# Patient Record
Sex: MALE | Race: WHITE | Employment: FULL TIME | URBAN - METROPOLITAN AREA
[De-identification: names, ages, dates, MRNs, and addresses within clinical notes are randomized per-mention and may not be internally consistent; named-entity substitution may affect disease eponyms.]

---

## 2017-01-03 ENCOUNTER — HOSPITAL ENCOUNTER (OUTPATIENT)
Dept: RADIATION ONCOLOGY | Age: 62
Discharge: HOME | End: 2017-01-03
Payer: COMMERCIAL

## 2017-01-03 PROCEDURE — 77412 RADIATION TX DELIVERY LVL 3: CPT

## 2017-01-03 PROCEDURE — 77300 RADIATION THERAPY DOSE PLAN: CPT

## 2017-01-03 PROCEDURE — 77387 GUIDANCE FOR RADJ TX DLVR: CPT

## 2017-01-03 PROCEDURE — 77417 THER RADIOLOGY PORT IMAGE(S): CPT

## 2017-01-03 PROCEDURE — 77295 3-D RADIOTHERAPY PLAN: CPT

## 2017-01-03 PROCEDURE — 77417 THER RADIOLOGY PORT IMAGE(S): CPT | Performed by: RADIOLOGY

## 2017-01-03 PROCEDURE — 77334 RADIATION TREATMENT AID(S): CPT

## 2017-01-04 PROCEDURE — 77387 GUIDANCE FOR RADJ TX DLVR: CPT

## 2017-01-04 PROCEDURE — 77412 RADIATION TX DELIVERY LVL 3: CPT

## 2017-01-04 PROCEDURE — 99212 OFFICE O/P EST SF 10 MIN: CPT

## 2017-01-05 PROCEDURE — 77387 GUIDANCE FOR RADJ TX DLVR: CPT

## 2017-01-05 PROCEDURE — 77412 RADIATION TX DELIVERY LVL 3: CPT

## 2017-01-06 PROCEDURE — 77387 GUIDANCE FOR RADJ TX DLVR: CPT | Performed by: RADIOLOGY

## 2017-01-06 PROCEDURE — 77412 RADIATION TX DELIVERY LVL 3: CPT | Performed by: RADIOLOGY

## 2017-01-09 PROCEDURE — 77412 RADIATION TX DELIVERY LVL 3: CPT | Performed by: RADIOLOGY

## 2017-01-09 PROCEDURE — 77387 GUIDANCE FOR RADJ TX DLVR: CPT | Performed by: RADIOLOGY

## 2017-01-09 PROCEDURE — 77417 THER RADIOLOGY PORT IMAGE(S): CPT | Performed by: RADIOLOGY

## 2017-01-10 PROCEDURE — 99213 OFFICE O/P EST LOW 20 MIN: CPT | Performed by: RADIOLOGY

## 2017-01-10 PROCEDURE — 77387 GUIDANCE FOR RADJ TX DLVR: CPT | Performed by: RADIOLOGY

## 2017-01-10 PROCEDURE — 77412 RADIATION TX DELIVERY LVL 3: CPT | Performed by: RADIOLOGY

## 2017-01-11 PROCEDURE — 77387 GUIDANCE FOR RADJ TX DLVR: CPT | Performed by: RADIOLOGY

## 2017-01-11 PROCEDURE — 77412 RADIATION TX DELIVERY LVL 3: CPT | Performed by: RADIOLOGY

## 2017-01-11 RX ORDER — CEFUROXIME AXETIL 500 MG/1
500 TABLET ORAL 2 TIMES DAILY
COMMUNITY
End: 2018-10-30

## 2017-01-12 PROCEDURE — 77412 RADIATION TX DELIVERY LVL 3: CPT | Performed by: RADIOLOGY

## 2017-01-12 PROCEDURE — 77387 GUIDANCE FOR RADJ TX DLVR: CPT | Performed by: RADIOLOGY

## 2017-01-13 PROCEDURE — 77412 RADIATION TX DELIVERY LVL 3: CPT | Performed by: RADIOLOGY

## 2017-01-13 PROCEDURE — 77336 RADIATION PHYSICS CONSULT: CPT | Performed by: RADIOLOGY

## 2017-01-13 PROCEDURE — 77387 GUIDANCE FOR RADJ TX DLVR: CPT | Performed by: RADIOLOGY

## 2017-01-16 PROCEDURE — 77336 RADIATION PHYSICS CONSULT: CPT | Performed by: RADIOLOGY

## 2017-01-16 PROCEDURE — 77387 GUIDANCE FOR RADJ TX DLVR: CPT | Performed by: RADIOLOGY

## 2017-01-16 PROCEDURE — 77412 RADIATION TX DELIVERY LVL 3: CPT | Performed by: RADIOLOGY

## 2018-09-06 ENCOUNTER — HOSPITAL ENCOUNTER (OUTPATIENT)
Dept: PHYSICAL THERAPY | Age: 63
Setting detail: THERAPIES SERIES
Discharge: HOME | End: 2018-09-06
Payer: COMMERCIAL

## 2018-09-06 PROCEDURE — G8978 MOBILITY CURRENT STATUS: HCPCS

## 2018-09-06 PROCEDURE — G8979 MOBILITY GOAL STATUS: HCPCS

## 2018-09-06 PROCEDURE — 97162 PT EVAL MOD COMPLEX 30 MIN: CPT

## 2018-09-06 PROCEDURE — 97110 THERAPEUTIC EXERCISES: CPT

## 2018-09-06 ASSESSMENT — PAIN DESCRIPTION - PAIN TYPE: TYPE: CHRONIC PAIN

## 2018-09-06 ASSESSMENT — PAIN DESCRIPTION - LOCATION: LOCATION: BACK

## 2018-09-06 ASSESSMENT — PAIN DESCRIPTION - ORIENTATION: ORIENTATION: MID;POSTERIOR

## 2018-09-06 ASSESSMENT — PAIN DESCRIPTION - FREQUENCY: FREQUENCY: CONTINUOUS

## 2018-09-06 ASSESSMENT — PAIN DESCRIPTION - DESCRIPTORS: DESCRIPTORS: ACHING;SORE

## 2018-09-06 ASSESSMENT — PAIN SCALES - GENERAL: PAINLEVEL_OUTOF10: 3

## 2018-09-06 ASSESSMENT — PAIN DESCRIPTION - DIRECTION: RADIATING_TOWARDS: DENIES RADICULAR PAIN

## 2018-09-06 NOTE — PROGRESS NOTES
[facility]   Outpatient Physical Therapy   Evaluation      [] [address]  [x] [facility]ENTER            of [address]     Date: 2018  Patient: Stalin Dye  : 1955  ACCT #: [de-identified]  Referring physician: Referring Practitioner: Dr. Sj Colin    Referring Practitioner: Dr. Sj Colin    Referral Date : 18    Diagnosis: multiple myeloma     Treatment Diagnosis: imbalance, difficulty with gait, decreased ROM, abnormal posture, LE weakness, decreased endurance   Onset Date: 16  PT Insurance Information: MMO  Total # of Visits Approved: 40   Total # of Visits to Date: 1  No Show: 0  Canceled Appointment: 0    History   has a past medical history of Cancer ([facility] [address] Elevated blood sugar; and Obesity. has a past surgical history that includes Mouth surgery; Foot surgery; hernia repair; and bone marrow biopsy. Cancer Diagnosis: multiple myeloma   Onset: 16    Treatment(s): (including dates, location, current, projected end of treatment)  []  Surgery:   []  Lymph node removal  [x]  Radiation: x1 mo  [x]  Chemotherapy: 2 rounds, finished   []  Other:    [x]  Known metastasis: (location) spinal lesions     Other related services:    []  Nutritionist   []  Palliative care   []  OT   []  Speech therapy    []      [x]  Medical Oncologist: Saira Rankin monthly     Current appointments: [x]  Yes []  No   [x]  Radiation Oncologist: Dr. John Rodriguez      Current appointments: []  Yes [x]  No     Allergies   Allergen Reactions    Shellfish-Derived Products Rash     Current Outpatient Prescriptions on File Prior to Encounter   Medication Sig Dispense Refill    cefUROXime (CEFTIN) 500 MG tablet Take 500 mg by mouth 2 times daily      ranitidine (ZANTAC) 150 MG tablet Take 150 mg by mouth daily      oxyCODONE 5 MG capsule Take 10 mg by mouth every 6 hours as needed for Pain .       traMADol (ULTRAM) 50 MG tablet Take 50 mg by mouth every 6 hours as needed for Pain       No current facility-administered medications on file prior to encounter. Subjective  Subjective: Pt reports diagnosed with multiple myeloma 12/25/16. Pt reports admitted to hospital with chest pain due to rib fx from myeloma. Pt reports 1 mo radiation and 2 courses of chemotherapy treatment. Pt reports multiple hospital admissions due to dehydration and PPM.  Pt reports was very weak and immobile requiring increased assistance for all mobility. Pt reports frequent anxiety attacks with initial movement. pt has been in remission x1 year. Now continued weakness, fatigue, difficulty with mobility, but improved since ending treatment. Pt reports spinal lesions, but unable to report location. Reports skeletal has mostly resolved. Would like to focus on improving strength and endurance to improve quality of life. Denies recent falls.    Additional Pertinent Hx: multiple myeloma   Pain Screening  Patient Currently in Pain: Yes  Pain Assessment  Pain Assessment: 0-10  Pain Level: 3 (Range: 2-8/10)  Pain Type: Chronic pain  Pain Location: Back (mid to lower thoracic )  Pain Orientation: Mid, Posterior  Pain Radiating Towards: denies radicular pain   Pain Descriptors: Aching, Sore  Pain Frequency: Continuous  Effect of Pain on Daily Activities: standing upright, yardwork  Pain Intervention(s): Rest, Cold applied    Social/Functional History  Lives With: Spouse  Type of Home: House  Home Layout: Two level, Bed/Bath upstairs (14 with 1 HR )  Home Access: Stairs to enter with rails  Entrance Stairs - Number of Steps: 4 with 1 HR   ADL Assistance: Independent  Homemaking Assistance: Needs assistance  Ambulation Assistance: Independent  Transfer Assistance: Independent  Active : Yes  Mode of Transportation: Car  Occupation: Part time employment  Type of occupation: working 4 hrs per day and at home as needed -    Leisure & Hobbies: would like to return to golf, construction around the house, working in the yard, ride bikes, hikes   IADL Comments: reports operating approximately 50-65% of normal function          Does patient currently participate in daily exercise:   [x]  No  []  Yes:    Side effects from cancer treatment: (include location when appropriate)  [x]   Pain: thoracic   [x]  Fatigue   [x]  Neuropathy: fingers and feet   [x]  Difficulty with ADLs   []  Assist needed   [x]  Change from previous functional status ~50% of normal function   []  Lymphedema  [x]  Limited ROM  back  []  Skin irritation/ burns:  [x]  Falls/ imbalance: during chemo, none recently   []  Difficulty with speech/ swallow:  [x]  Weight loss/ gain:  Lost 130#    Objective  Vision  Vision: Impaired (reports difficulty focusing at times, blurred with fatigue )  Hearing  Hearing: Within functional limits  Observation/Palpation  Posture: Fair (moderate kyphoscoliosis with elevated Rt scap and iliac crest, sig rounded shoulders )  Palpation: tenderness with palpation Lt iliac crest, paraspinals, thoracic spine   Edema: mild edema Rt distal LE, pt reports this is not new, no warmth or discoloration     Strength RLE  Strength RLE: Exception  R Hip Flexion: 4+/5  R Knee Flexion: 5/5  R Knee Extension: 5/5  R Ankle Dorsiflexion: 4+/5  Strength LLE  Strength LLE: Exception  L Hip Flexion: 4/5  L Knee Flexion: 4+/5  L Knee Extension: 5/5  L Ankle Dorsiflexion: 4+/5                                           Sensation  Overall Sensation Status:  (c/o CINP fingers, feet )            Ambulation 1  Surface: carpet  Device: No Device  Assistance: Independent  Quality of Gait: sig fwd flexed posture, decreased step length and foot clearance, no LOB, decreased teri, fatigues quickly   Distance: 35'   Comments: , spO2 99%  Stairs  # Steps : 4  Stairs Height: 8\"  Rails: Bilateral  Device: No Device  Assistance: Supervision  Comment: reciprocal up, non-reciprocal down            Dhaliwal Balance Score: Therapy - Soft Tissue Mobilization, Home Exercise Program, Safety Education & Training, Patient/Caregiver Education & Training, Equipment Evaluation, Education, & procurement, Modalities       Evaluation and patient rights have been reviewed and patient agrees with plan of care. Yes  [x]  No  []   Explain:     Signature: Electronically signed by Marlo Vee PT on 9/7/2018 at 7:52 AM    PT Individual Minutes  Time In: 0198  Time Out: 3924  Minutes: 53  Timed Code Treatment Minutes: 9 Minutes  Procedure Minutes: 40    Desir Fall Risk Assessment  Risk Factor Scale  Score   History of Falls [x] Yes  [] No 25  0 25   Secondary Diagnosis [] Yes  [x] No 15  0 0   Ambulatory Aid [] Furniture  [] Crutches/cane/walker  [x] None/bedrest/wheelchair/nurse 30  15  0 0   IV/Heparin Lock [] Yes  [x] No 20  0 0   Gait/Transferring [] Impaired  [x] Weak  [] Normal/bedrest/immobile 20  10  0 10   Mental Status [] Forgets limitations  [x] Oriented to own ability 15  0 0      Total: 35     Based on the Assessment score: check the appropriate box.   []  No intervention needed   Low =   Score of 0-24  [x]  Use standard prevention interventions Moderate =  Score of 24-44   [x] Discuss fall prevention strategies   [x] Indicate moderate falls risk on eval  []  Use high risk prevention interventions High = Score of 45 and higher   [] Discuss fall prevention strategies   [] Provide supervision during treatment time

## 2018-09-10 ENCOUNTER — HOSPITAL ENCOUNTER (OUTPATIENT)
Dept: PHYSICAL THERAPY | Age: 63
Setting detail: THERAPIES SERIES
Discharge: HOME | End: 2018-09-10
Payer: COMMERCIAL

## 2018-09-10 PROCEDURE — 97140 MANUAL THERAPY 1/> REGIONS: CPT

## 2018-09-10 PROCEDURE — 97110 THERAPEUTIC EXERCISES: CPT

## 2018-09-10 ASSESSMENT — PAIN DESCRIPTION - PAIN TYPE: TYPE: CHRONIC PAIN

## 2018-09-10 ASSESSMENT — PAIN DESCRIPTION - FREQUENCY: FREQUENCY: CONTINUOUS

## 2018-09-10 ASSESSMENT — PAIN SCALES - GENERAL: PAINLEVEL_OUTOF10: 1

## 2018-09-10 ASSESSMENT — PAIN DESCRIPTION - LOCATION: LOCATION: BACK

## 2018-09-10 ASSESSMENT — PAIN DESCRIPTION - ORIENTATION: ORIENTATION: MID;UPPER;POSTERIOR

## 2018-09-10 ASSESSMENT — PAIN DESCRIPTION - DESCRIPTORS: DESCRIPTORS: ACHING;SORE;TIGHTNESS

## 2018-09-10 NOTE — PROGRESS NOTES
[facility]   Outpatient Physical Therapy   Treatment Note  [] [address]  [x] [facility]  Date: 9/10/2018  Patient: Enrike Ryan  : 1955  ACCT #: [de-identified]  Referring Practitioner: Dr. Justino Machuca  Diagnosis: multiple myeloma     Visit Information:  PT Visit Information  Onset Date: 16  PT Insurance Information: MMO  Total # of Visits Approved: 40  Total # of Visits to Date: 2  No Show: 0  Canceled Appointment: 0  Progress Note Counter:     SUBJECTIVE:   Subjective  Subjective: \"Feel real tight but think exercises are helping loosen  me up\"  HEP Compliance:  [x] Good [] Fair [] Poor [] Reports not doing due to:    PAIN   Location:   Pain Location: Back  Pain Rating (0-10 pain scale):  Pain Level: 1  Pain Description:  Pain Descriptors: Aching, Sore, Tightness  Action:  [x] Acceptable for treatment  []  Other:    OBJECTIVE:   Exercises  Exercise 1: static stand feet apart/ together with eyes open; feet apart/together with eyes closed 30s/2 to improve balance   Exercise 2: posture exs x 15 ea to improve upright posture, balance and gait quality with VCs to keep within tolerable range   Exercise 4: Initiated seated hams str 30sx3 amie   Exercise 5: Initiated supine pec str x 2'   Exercise 6: Initiated chin tucks 5sx10   Exercise 7: Initiated bridges x 10 for core stability   Exercise 11: Attempted standing hip abduction x5 with increase fatigue  Exercise 20: HEP: chin tucks, trunk rotation, hamstring stretch    Manual: []  NA   Manual therapy  Soft Tissue Mobalization:  paraspinals release seated and MFR to pec muscles supine      HEP  Continue with current Home Exercise Program.  See Objective section for progression of HEP. Comments:       POST-PAIN    Pain Rating (0-10 pain scale): 0/10  Location and Pain Description same as pre-pain unless otherwise indicated.   Action: [] NA  [] Call Physician  [x] Perform HEP  [] Meds as prescribed     ASSESSMENT:     Conditions Requiring Skilled Therapeutic Intervention  Assessment: Pt required frequent rest breaks with tx as fatigued quickly. Frequent cuing with tx for upright posutre  Patient Education: Discussed with pt posture awareness and diaphramic breathing     Tolerance to treatment:  [x] Good   [] Fair   [] Poor  [] Fatigued   [] Increased pain   Limited by:    Goals:     Short term goals  Time Frame for Short term goals: 3 wks   Short term goal 1: Independent with HEP   Short term goal 2: Report 25% improvement with function and decreased fatigue  Short term goal 3: Improve LE strength 1/2 grade to allow improved gait quality and balance   Long term goals  Time Frame for Long term goals : 6 wks   Long term goal 1: Dhaliwal >/= 50/56 to demonstrate improved static balance and decreased risk for falls   Long term goal 2: DGI >/= 18/24 to demonstrate improved dynamic balance and decreased risk for falls   Long term goal 3: Ambulate >/= 150' with improved step length, foot clearance and upright posture   Long term goal 4: Improve FACT G by 5 points to demonstrate improved overall functional well being   Long term goal 5: Improve Piper Fatigue score by 4 points to demonstrate improved activity tolerance     Progress toward goals:  Goals Met:    []  See updated POC   Comments:    PLAN:  [x] Continue POC to pt tolerance  [] Hold PT for ___ days.   See note to physician  [] Discharge PT    Signature:   Electronically signed by Mera Stanford PTA on 9/10/18 at 9:31 AM    PT Individual Minutes  Time In: 6815  Time Out: 9608  Minutes: 45   Activity Minutes Units   Ther Ex 35 2   Manual   10  1   Neuro Ed     US         Timed Code Treatment Minutes: 39 Minutes

## 2018-09-12 ENCOUNTER — HOSPITAL ENCOUNTER (OUTPATIENT)
Dept: PHYSICAL THERAPY | Age: 63
Setting detail: THERAPIES SERIES
Discharge: HOME | End: 2018-09-12
Payer: COMMERCIAL

## 2018-09-12 PROCEDURE — 97140 MANUAL THERAPY 1/> REGIONS: CPT

## 2018-09-12 PROCEDURE — 97110 THERAPEUTIC EXERCISES: CPT

## 2018-09-12 ASSESSMENT — PAIN DESCRIPTION - LOCATION: LOCATION: BACK

## 2018-09-12 ASSESSMENT — PAIN DESCRIPTION - ORIENTATION: ORIENTATION: MID;UPPER;POSTERIOR

## 2018-09-12 ASSESSMENT — PAIN DESCRIPTION - DESCRIPTORS: DESCRIPTORS: ACHING;SORE;TIGHTNESS

## 2018-09-12 ASSESSMENT — PAIN DESCRIPTION - FREQUENCY: FREQUENCY: CONTINUOUS

## 2018-09-12 ASSESSMENT — PAIN SCALES - GENERAL: PAINLEVEL_OUTOF10: 1

## 2018-09-12 ASSESSMENT — PAIN DESCRIPTION - PAIN TYPE: TYPE: CHRONIC PAIN

## 2018-09-12 NOTE — PROGRESS NOTES
[facility]   Outpatient Physical Therapy   Treatment Note  [] [address]  [x] [facility]  Date: 2018  Patient: Silvino Kebede  : 1955  ACCT #: [de-identified]  Referring Practitioner: Dr. Ele Mario  Diagnosis: multiple myeloma     Visit Information:  PT Visit Information  Onset Date: 16  PT Insurance Information: MMO  Total # of Visits Approved: 40  Total # of Visits to Date: 3  No Show: 0  Canceled Appointment: 0  Progress Note Counter: 3/12    SUBJECTIVE:   Subjective  Subjective: \"Feel stiff and tight today. My back is a little sore\"  HEP Compliance:  [x] Good [] Fair [] Poor [] Reports not doing due to:    PAIN   Location:   Pain Location: Back  Pain Rating (0-10 pain scale):  Pain Level: 1  Pain Description:  Pain Descriptors: Aching, Sore, Tightness  Action:  [x] Acceptable for treatment  []  Other:    OBJECTIVE:   Exercises  Exercise 1: static stand feet apart/ together with eyes open; feet apart/together with eyes closed 30s/2 to improve balance   Exercise 2: posture exs x 15 ea to improve upright posture, balance and gait quality with VCs to keep within tolerable range   Exercise 3: Initiated nustep L1 x 5' for 4 ext and trunk ROM, strengthening and endurance  Exercise 4: seated hams str 30sx3 amie   Exercise 5: supine pec str x 3'   Exercise 6:  chin tucks 5sx10   Exercise 7:  bridges x 10 for core stability   Exercise 12: trunk rotation 30sx3 to increase trunk flexibility  Exercise 13: Initiated DLS supine UE/LE separately x 10 for core stability    Manual: []  NA   Manual therapy  Soft Tissue Mobalization:  paraspinals release seated and MFR to pec muscles supine    Modalities: [x]  NA        Mobility: [x]  NA    HEP  Continue with current Home Exercise Program.  See Objective section for progression of HEP.       Comments:       POST-PAIN    Pain Rating (0-10 pain scale): 0/10  Location and Pain Description same as pre-pain unless otherwise indicated. Action: [] NA  [] Call Physician  [x] Perform HEP  [] Meds as prescribed     ASSESSMENT:     Conditions Requiring Skilled Therapeutic Intervention  Assessment: Pt. continues to fatigue quickly with tx. Frequent cues with tx for upright posture. Pt reports decrease soreness and tightness after tx. Tolerance to treatment:  [x] Good   [] Fair   [] Poor  [] Fatigued   [] Increased pain   Limited by:    Goals:     Short term goals  Time Frame for Short term goals: 3 wks   Short term goal 1: Independent with HEP   Short term goal 2: Report 25% improvement with function and decreased fatigue  Short term goal 3: Improve LE strength 1/2 grade to allow improved gait quality and balance   Long term goals  Time Frame for Long term goals : 6 wks   Long term goal 1: Dhaliwal >/= 50/56 to demonstrate improved static balance and decreased risk for falls   Long term goal 2: DGI >/= 18/24 to demonstrate improved dynamic balance and decreased risk for falls   Long term goal 3: Ambulate >/= 150' with improved step length, foot clearance and upright posture   Long term goal 4: Improve FACT G by 5 points to demonstrate improved overall functional well being   Long term goal 5: Improve Piper Fatigue score by 4 points to demonstrate improved activity tolerance     Progress toward goals:  Goals Met:    []  See updated POC   Comments:    PLAN:  [x] Continue POC to pt tolerance  [] Hold PT for ___ days.   See note to physician  [] Discharge PT    Signature:   Electronically signed by Kapil Quintanilla PTA on 9/12/18 at 9:21 AM    PT Individual Minutes  Time In: 0800  Time Out: 4988  Minutes: 53   Activity Minutes Units   Ther Ex 38 3   Manual   15  1   Neuro Ed     US         Timed Code Treatment Minutes: 48 Minutes

## 2018-09-17 ENCOUNTER — HOSPITAL ENCOUNTER (OUTPATIENT)
Dept: PHYSICAL THERAPY | Age: 63
Setting detail: THERAPIES SERIES
Discharge: HOME | End: 2018-09-17
Payer: COMMERCIAL

## 2018-09-17 PROCEDURE — 97110 THERAPEUTIC EXERCISES: CPT

## 2018-09-17 PROCEDURE — 97112 NEUROMUSCULAR REEDUCATION: CPT

## 2018-09-17 PROCEDURE — 97140 MANUAL THERAPY 1/> REGIONS: CPT

## 2018-09-17 ASSESSMENT — PAIN DESCRIPTION - LOCATION: LOCATION: BACK

## 2018-09-17 ASSESSMENT — PAIN DESCRIPTION - ORIENTATION: ORIENTATION: MID;UPPER

## 2018-09-17 ASSESSMENT — PAIN SCALES - GENERAL: PAINLEVEL_OUTOF10: 3

## 2018-09-17 NOTE — PROGRESS NOTES
JAYY Gonzalez on 9/17/18 at 2:05 PM    PT Individual Minutes  Time In: 1402  Time Out: 1457  Minutes: 55  Timed Code Treatment Minutes: 55 Minutes           Activity Minutes Units   Ther Ex 30 2   Manual  10 1   Neuro Ed 15 1

## 2018-09-20 ENCOUNTER — HOSPITAL ENCOUNTER (OUTPATIENT)
Dept: PHYSICAL THERAPY | Age: 63
Setting detail: THERAPIES SERIES
Discharge: HOME | End: 2018-09-20
Payer: COMMERCIAL

## 2018-09-20 PROCEDURE — 97140 MANUAL THERAPY 1/> REGIONS: CPT

## 2018-09-20 PROCEDURE — 97110 THERAPEUTIC EXERCISES: CPT

## 2018-09-20 ASSESSMENT — PAIN DESCRIPTION - LOCATION: LOCATION: BACK

## 2018-09-20 ASSESSMENT — PAIN DESCRIPTION - DESCRIPTORS: DESCRIPTORS: SORE

## 2018-09-20 ASSESSMENT — PAIN DESCRIPTION - ORIENTATION: ORIENTATION: MID

## 2018-09-20 ASSESSMENT — PAIN SCALES - GENERAL: PAINLEVEL_OUTOF10: 2

## 2018-09-20 NOTE — PROGRESS NOTES
[facility]   Outpatient Physical Therapy   Treatment Note  [] [address]  [x] [facility]ENTER            of [address]  Date: 2018  Patient: Audrey Burgess   : 1955  ACCT #: [de-identified]  Referring Practitioner: Dr. Avelino Apgar  Diagnosis: multiple myeloma      Visit Information:  PT Visit Information  Onset Date: 16  PT Insurance Information: MMO  Total # of Visits Approved: 40  Total # of Visits to Date: 5  No Show: 0  Canceled Appointment: 0  Progress Note Counter:     SUBJECTIVE:   Subjective  Subjective: Pt reports feeling much better this date than last appt on Monday. States thinks may have had cold. Was sick following 2 days. Pt reports compliance with HEP. HEP Compliance:  [x] Good [] Fair [] Poor [] Reports not doing due to:    PAIN   Location:   Pain Location: Back  Pain Rating (0-10 pain scale):  Pain Level: 2  Pain Description:  Pain Descriptors: Sore  Action:  [x] Acceptable for treatment  []  Other:    OBJECTIVE:   Exercises  Exercise 2: posture exs x 15 ea to improve upright posture, balance and gait quality with VCs to keep within tolerable range   Exercise 4: standing hams str 30s x 3 amie   Exercise 5: supine pec str x 3'   Exercise 6: chin tucks seated 5s x 15   Exercise 7: bridges 5s x 10 for core stability   Exercise 8: SLR x7 b/l  Exercise 10: gait drills: marching/L/R x 2 laps in // bars with occasional 1 UE support  Exercise 12: LTR 10s x 10 to increase trunk flexibility  Exercise 14: seated DF 3s x 10  Exercise 20: HEP: bridge, DF, SLR    Manual: []  NA   Manual therapy  Soft Tissue Mobalization:  paraspinals release seated and MFR to pec muscles supine    Modalities: [x]  NA    Mobility: [x]  NA    Strength: [x] NT    ROM: [x] NT    HEP  Continue with current Home Exercise Program.  See Objective section for progression of HEP.       Comments:       POST-PAIN    Pain Rating (0-10 pain scale):  0/10  Location and

## 2018-09-24 ENCOUNTER — HOSPITAL ENCOUNTER (OUTPATIENT)
Dept: PHYSICAL THERAPY | Age: 63
Setting detail: THERAPIES SERIES
Discharge: HOME | End: 2018-09-24
Payer: COMMERCIAL

## 2018-09-24 PROCEDURE — 97140 MANUAL THERAPY 1/> REGIONS: CPT

## 2018-09-24 PROCEDURE — 97110 THERAPEUTIC EXERCISES: CPT

## 2018-09-24 ASSESSMENT — PAIN DESCRIPTION - DESCRIPTORS: DESCRIPTORS: SORE;TIGHTNESS

## 2018-09-24 ASSESSMENT — PAIN DESCRIPTION - LOCATION: LOCATION: BACK

## 2018-09-24 ASSESSMENT — PAIN DESCRIPTION - PAIN TYPE: TYPE: CHRONIC PAIN

## 2018-09-24 ASSESSMENT — PAIN SCALES - GENERAL: PAINLEVEL_OUTOF10: 2

## 2018-09-24 ASSESSMENT — PAIN DESCRIPTION - ORIENTATION: ORIENTATION: MID

## 2018-09-24 NOTE — PROGRESS NOTES
[facility]   Outpatient Physical Therapy   Treatment Note  [] [address]  [x] [facility]ENTER            of [address]  Date: 2018  Patient: Judy Hernandez  : 1955  ACCT #: [de-identified]  Referring Practitioner: Dr. Johanna Aguilar  Diagnosis: multiple myeloma     Visit Information:  PT Visit Information  Onset Date: 16  PT Insurance Information: MMO  Total # of Visits Approved: 40  Total # of Visits to Date: 6  No Show: 0  Canceled Appointment: 0  Progress Note Counter:     SUBJECTIVE:   Subjective  Subjective: \"Feel PT is helping. Over did my LTR on Friday. Went too far. Pain was 7/10. Oneal Watts Sore over the w/e\"  HEP Compliance:  [x] Good [] Fair [] Poor [] Reports not doing due to:    PAIN   Location:   Pain Location: Back  Pain Rating (0-10 pain scale):  Pain Level: 2  Pain Description:  Pain Descriptors: Sore, Tightness  Action:  [x] Acceptable for treatment  []  Other:    OBJECTIVE:   Exercises  Exercise 2: posture exs x 15 ea to improve upright posture, balance and gait quality with VCs to keep within tolerable range   Exercise 3: nustep L1 x 5' for 4 ext and trunk ROM, strengthening and endurance  Exercise 4: standing hams str 30s x 3 amie   Exercise 5: supine pec str x 3'   Exercise 6: chin tucks seated 5s x 15   Exercise 7: bridges 5s x 15 for core stability   Exercise 9: sink ex x 5ea with 1 seated rest break after 3 ex. Exercise 12: LTR 10s x 10 to increase trunk flexibility  Exercise 13: DLS supine UE/LE together3 way x 10 for core stability  Exercise 14: seated DF 5s x 10    Manual: []  NA   Manual therapy  Soft Tissue Mobalization:  paraspinals release and MFR to pec muscles . STM and MFR to thoracic region prone      HEP  Continue with current Home Exercise Program.  See Objective section for progression of HEP.       Comments:       POST-PAIN    Pain Rating (0-10 pain scale): 1/10  Location and Pain Description same as pre-pain unless otherwise indicated. Action: [] NA  [] Call Physician  [x] Perform HEP  [] Meds as prescribed     ASSESSMENT:     Conditions Requiring Skilled Therapeutic Intervention  Assessment: Increase endurance to standing ex noted this date. Pt with increase tightness noted in thoracic musclature. Pt reports  decrease muscle tightness and pain after manual. Frequent cues with tx for posture awareness     Tolerance to treatment:  [x] Good   [] Fair   [] Poor  [] Fatigued   [] Increased pain   Limited by:    Goals:     Short term goals  Time Frame for Short term goals: 3 wks   Short term goal 1: Independent with HEP   Short term goal 2: Report 25% improvement with function and decreased fatigue  Short term goal 3: Improve LE strength 1/2 grade to allow improved gait quality and balance   Long term goals  Time Frame for Long term goals : 6 wks   Long term goal 1: Dhaliwal >/= 50/56 to demonstrate improved static balance and decreased risk for falls   Long term goal 2: DGI >/= 18/24 to demonstrate improved dynamic balance and decreased risk for falls   Long term goal 3: Ambulate >/= 150' with improved step length, foot clearance and upright posture   Long term goal 4: Improve FACT G by 5 points to demonstrate improved overall functional well being   Long term goal 5: Improve Piper Fatigue score by 4 points to demonstrate improved activity tolerance     Progress toward goals:  Goals Met:    []  See updated POC   Comments:    PLAN:  [x] Continue POC to pt tolerance  [] Hold PT for ___ days.   See note to physician  [] Discharge PT    Signature:   Electronically signed by Marlys Gaspar PTA on 9/24/18 at 2:19 PM    PT Individual Minutes  Time In: 3145  Time Out: 1500  Minutes: 58   Activity Minutes Units   Ther Ex 38 3   Manual   20  1   Neuro Ed     US         Timed Code Treatment Minutes: 62 Minutes

## 2018-09-27 ENCOUNTER — HOSPITAL ENCOUNTER (OUTPATIENT)
Dept: PHYSICAL THERAPY | Age: 63
Setting detail: THERAPIES SERIES
Discharge: HOME | End: 2018-09-27
Payer: COMMERCIAL

## 2018-09-27 PROCEDURE — 97140 MANUAL THERAPY 1/> REGIONS: CPT

## 2018-09-27 PROCEDURE — 97110 THERAPEUTIC EXERCISES: CPT

## 2018-09-27 ASSESSMENT — PAIN DESCRIPTION - PAIN TYPE: TYPE: CHRONIC PAIN

## 2018-09-27 ASSESSMENT — PAIN DESCRIPTION - DESCRIPTORS: DESCRIPTORS: SORE;TIGHTNESS

## 2018-09-27 ASSESSMENT — PAIN DESCRIPTION - LOCATION: LOCATION: BACK

## 2018-09-27 ASSESSMENT — PAIN SCALES - GENERAL: PAINLEVEL_OUTOF10: 2

## 2018-09-27 ASSESSMENT — PAIN DESCRIPTION - ORIENTATION: ORIENTATION: MID

## 2018-09-27 NOTE — PROGRESS NOTES
[facility]   Outpatient Physical Therapy   Treatment Note  [] [address]  [x] [facility]            of [place]  Date: 2018  Patient: Yazmin Eldridge  : 1955  ACCT #: [de-identified]  Referring Practitioner: Dr. Mihaela Kruse  Diagnosis: multiple myeloma     Visit Information:  PT Visit Information  Onset Date: 16  PT Insurance Information: MMO  Total # of Visits Approved: 40  Total # of Visits to Date: 7  No Show: 0  Canceled Appointment: 0  Progress Note Counter:     SUBJECTIVE:   Subjective  Subjective: Pt reports 30% to 40% improvement with function and endurance since starting PT. \"Was hurting after last visit 6/10. Better today\"  HEP Compliance:  [x] Good [] Fair [] Poor [] Reports not doing due to:    PAIN   Location:   Pain Location: Back  Pain Rating (0-10 pain scale):  Pain Level: 2  Pain Description:  Pain Descriptors: Sore, Tightness  Action:  [x] Acceptable for treatment  []  Other:    OBJECTIVE:   Exercises  Exercise 2: posture exs x 15 ea to improve upright posture, balance and gait quality with VCs to keep within tolerable range   Exercise 3: nustep L1 x 5' for 4 ext and trunk ROM, strengthening and endurance  Exercise 4: seated hams str amie and left gastroc stretch with strap  Exercise 5: supine pec str x 3'   Exercise 7: bridges 5s x 15 for core stability   Exercise 9: sink ex with modified hip abduction and ext 5ea with no rest break  Exercise 12: LTR 10s x 10 to increase trunk flexibility  Exercise 13: DLS supine UE/LE together x 10 for core stability  Exercise 14: seated DF 5s x 10 with strap on LLE to increase ROM  Exercise 20: HEP: sink ex 5 reps with modiffcations    Manual: []  NA   Manual therapy  Soft Tissue Mobalization:  paraspinals release and MFR to pec muscles .  STM with and without tennis ball and MFR to thoracic region prone    Modalities: []  NA        HEP  Continue with current Home Exercise Program.  See Objective section for progression of HEP. Comments:       POST-PAIN    Pain Rating (0-10 pain scale): 1/10  Location and Pain Description same as pre-pain unless otherwise indicated. Action: [] NA  [] Call Physician  [x] Perform HEP  [] Meds as prescribed     ASSESSMENT:     Conditions Requiring Skilled Therapeutic Intervention  Assessment: Increase in endurance noted this date. Tightness with multiple spasms noted in amie intrascap and thoracic regions noted      Tolerance to treatment:  [x] Good   [] Fair   [] Poor  [] Fatigued   [] Increased pain   Limited by:    Goals:     Short term goals  Time Frame for Short term goals: 3 wks   Short term goal 1: Independent with HEP   Short term goal 2: Report 25% improvement with function and decreased fatigue  Short term goal 3: Improve LE strength 1/2 grade to allow improved gait quality and balance   Long term goals  Time Frame for Long term goals : 6 wks   Long term goal 1: Dhaliwal >/= 50/56 to demonstrate improved static balance and decreased risk for falls   Long term goal 2: DGI >/= 18/24 to demonstrate improved dynamic balance and decreased risk for falls   Long term goal 3: Ambulate >/= 150' with improved step length, foot clearance and upright posture   Long term goal 4: Improve FACT G by 5 points to demonstrate improved overall functional well being   Long term goal 5: Improve Piper Fatigue score by 4 points to demonstrate improved activity tolerance     Progress toward goals:  Goals Met:    []  See updated POC   Comments:    PLAN:  [x] Continue POC to pt tolerance  [] Hold PT for ___ days.   See note to physician  [] Discharge PT    Signature:   Electronically signed by Sterling Nieves PTA on 9/27/18 at 2:12 PM    PT Individual Minutes  Time In: 1400  Time Out: 5871  Minutes: 58   Activity Minutes Units   Ther Ex 40 2   Manual   18  1   Neuro Ed     US         Timed Code Treatment Minutes: 62 Minutes

## 2018-10-01 ENCOUNTER — HOSPITAL ENCOUNTER (OUTPATIENT)
Dept: PHYSICAL THERAPY | Age: 63
Setting detail: THERAPIES SERIES
Discharge: HOME | End: 2018-10-01
Payer: COMMERCIAL

## 2018-10-01 PROCEDURE — 97110 THERAPEUTIC EXERCISES: CPT

## 2018-10-01 ASSESSMENT — PAIN DESCRIPTION - ORIENTATION: ORIENTATION: LEFT;RIGHT

## 2018-10-01 ASSESSMENT — PAIN DESCRIPTION - LOCATION: LOCATION: LEG;ABDOMEN

## 2018-10-01 ASSESSMENT — PAIN SCALES - GENERAL: PAINLEVEL_OUTOF10: 2

## 2018-10-04 ENCOUNTER — HOSPITAL ENCOUNTER (OUTPATIENT)
Dept: PHYSICAL THERAPY | Age: 63
Setting detail: THERAPIES SERIES
Discharge: HOME | End: 2018-10-04
Payer: COMMERCIAL

## 2018-10-04 PROCEDURE — 97110 THERAPEUTIC EXERCISES: CPT

## 2018-10-04 ASSESSMENT — PAIN DESCRIPTION - DESCRIPTORS: DESCRIPTORS: ACHING

## 2018-10-04 ASSESSMENT — PAIN DESCRIPTION - ORIENTATION: ORIENTATION: UPPER

## 2018-10-04 ASSESSMENT — PAIN DESCRIPTION - PAIN TYPE: TYPE: CHRONIC PAIN

## 2018-10-04 ASSESSMENT — PAIN SCALES - GENERAL: PAINLEVEL_OUTOF10: 2

## 2018-10-04 ASSESSMENT — PAIN DESCRIPTION - LOCATION: LOCATION: BACK

## 2018-10-08 ENCOUNTER — HOSPITAL ENCOUNTER (OUTPATIENT)
Dept: PHYSICAL THERAPY | Age: 63
Setting detail: THERAPIES SERIES
Discharge: HOME | End: 2018-10-08
Payer: COMMERCIAL

## 2018-10-08 PROCEDURE — 97110 THERAPEUTIC EXERCISES: CPT

## 2018-10-08 ASSESSMENT — PAIN SCALES - GENERAL: PAINLEVEL_OUTOF10: 0

## 2018-10-11 ENCOUNTER — HOSPITAL ENCOUNTER (OUTPATIENT)
Dept: PHYSICAL THERAPY | Age: 63
Setting detail: THERAPIES SERIES
Discharge: HOME | End: 2018-10-11
Payer: COMMERCIAL

## 2018-10-11 PROCEDURE — 97140 MANUAL THERAPY 1/> REGIONS: CPT

## 2018-10-11 PROCEDURE — 97110 THERAPEUTIC EXERCISES: CPT

## 2018-10-11 ASSESSMENT — PAIN SCALES - GENERAL: PAINLEVEL_OUTOF10: 4

## 2018-10-11 ASSESSMENT — PAIN DESCRIPTION - ORIENTATION: ORIENTATION: LEFT;UPPER

## 2018-10-11 ASSESSMENT — PAIN DESCRIPTION - DESCRIPTORS: DESCRIPTORS: ACHING;TIGHTNESS

## 2018-10-11 ASSESSMENT — PAIN DESCRIPTION - PAIN TYPE: TYPE: CHRONIC PAIN

## 2018-10-11 ASSESSMENT — PAIN DESCRIPTION - LOCATION: LOCATION: BACK

## 2018-10-11 NOTE — PROGRESS NOTES
[facility]   Outpatient Physical Therapy   Treatment Note  [] [address]  [x] [facility]            of [address]  Date: 10/11/2018  Patient: Oscar Pina  : 1955  ACCT #: [de-identified]  Referring Practitioner: Dr. Jani Nair  Diagnosis: multiple myeloma     Visit Information:  PT Visit Information  Onset Date: 16  PT Insurance Information: MMO  Total # of Visits Approved: 40  Total # of Visits to Date: 11  No Show: 0  Canceled Appointment: 0  Progress Note Counter:     SUBJECTIVE:   Subjective  Subjective: \"Was sore 6-7/10 for 2 days after last tx  mostly on left side. Used heating pad and didn't do my exercises. Think exercises with ball under my legs and theraband ex increases my pain\"  HEP Compliance:  [x] Good [] Fair [] Poor [] Reports not doing due to:    PAIN   Location:   Pain Location: Back  Pain Rating (0-10 pain scale):  Pain Level: 4  Pain Description:  Pain Descriptors: Aching, Tightness  Action:  [x] Acceptable for treatment  []  Other:    OBJECTIVE:   Exercises  Exercise 1: wall stands 30s x 3  Exercise 2: Held YTB due to reports of increasing pain  Exercise 3: nustep L2 x 10' for ext and trunk ROM, strengthening and endurance  Exercise 4: seated B/L hams str and gastroc str with strap 30s x 3 ea  Exercise 5: Pec stretch in doorway 30s x 3  Exercise 7:  bridges 5sx10  Exercise 9: sink ex x12 ea without rest break  Exercise 12: LTR  10s x 5 to increase trunk flexibility  Exercise 13: DLS supine UE/LE together x 15 for core stability    Manual: []  NA   Manual therapy  Soft Tissue Mobalization:  postural paraspinals release and MFR to pec muscles . STM and releases to thoracic and scapular musclature with pball support to LB with pt seated    Modalities: []  NA    Mobility: [x]  NA    HEP  Continue with current Home Exercise Program.  See Objective section for progression of HEP.       Comments:       POST-PAIN    Pain

## 2018-10-15 ENCOUNTER — HOSPITAL ENCOUNTER (OUTPATIENT)
Dept: PHYSICAL THERAPY | Age: 63
Setting detail: THERAPIES SERIES
Discharge: HOME | End: 2018-10-15
Payer: COMMERCIAL

## 2018-10-15 PROCEDURE — 97140 MANUAL THERAPY 1/> REGIONS: CPT

## 2018-10-15 PROCEDURE — 97110 THERAPEUTIC EXERCISES: CPT

## 2018-10-15 PROCEDURE — 97116 GAIT TRAINING THERAPY: CPT

## 2018-10-15 PROCEDURE — G8979 MOBILITY GOAL STATUS: HCPCS

## 2018-10-15 PROCEDURE — G8978 MOBILITY CURRENT STATUS: HCPCS

## 2018-10-15 PROCEDURE — 97112 NEUROMUSCULAR REEDUCATION: CPT

## 2018-10-15 ASSESSMENT — PAIN DESCRIPTION - PAIN TYPE: TYPE: CHRONIC PAIN

## 2018-10-15 ASSESSMENT — PAIN DESCRIPTION - LOCATION: LOCATION: BACK

## 2018-10-15 ASSESSMENT — PAIN DESCRIPTION - DESCRIPTORS: DESCRIPTORS: ACHING;TIGHTNESS

## 2018-10-15 ASSESSMENT — PAIN DESCRIPTION - ORIENTATION: ORIENTATION: LEFT;UPPER

## 2018-10-15 ASSESSMENT — PAIN SCALES - GENERAL: PAINLEVEL_OUTOF10: 3

## 2018-10-15 NOTE — PROGRESS NOTES
[facility]   Outpatient Physical Therapy   Treatment Note  [] [address]  [x] [facility]            of [address]  Date: 10/15/2018  Patient: Swathi Cadena  : 1955  ACCT #: [de-identified]  Referring Practitioner: Dr. Celestina Siemens  Diagnosis: multiple myeloma     Visit Information:  PT Visit Information  Onset Date: 16  PT Insurance Information: MMO  Total # of Visits Approved: 40  Total # of Visits to Date: 12  No Show: 0  Canceled Appointment: 0  Progress Note Counter:     SUBJECTIVE:   Subjective  Subjective: \"Feel I've improved about 60% in function and decrease fatigue since started PT. Still have to be carefule not to overdue it. \"  HEP Compliance:  [x] Good [] Fair [] Poor [] Reports not doing due to:    PAIN   Location:   Pain Location: Back  Pain Rating (0-10 pain scale):  Pain Level: 3  Pain Description:  Pain Descriptors: Aching, Tightness  Action:  [x] Acceptable for treatment  []  Other:    OBJECTIVE:   Exercises  Exercise 1: wall stands 30s x 3  Exercise 2: Held YTB due to reports of increasing pain  Exercise 3: nustep L3 x 10' for ext and trunk ROM, strengthening and endurance  Exercise 5: Pec stretch in doorway 30s x 3    Manual: []  NA   Manual therapy  Soft Tissue Mobalization:  postural paraspinals release and MFR to pec muscles . STM and releases to thoracic and scapular musclature with pball support to LB with pt seated    Modalities: [x]  NA        Mobility: []  NA     Ambulation 1  Surface: carpet  Device: No Device  Assistance: Independent  Quality of Gait: pt ambulating with improved upright posture, decreased Lt heel strike and foot clearance, improving teri, mild steppage gait pattern   Distance: 35'  Ambulation 2  Surface - 2: carpet  Device 2: No device  Other Apparatus 2: Dorsiflex Assist (wrap )  Assistance 2:  Independent  Quality of Gait 2: pt with improved heel strike, teri, and step length with dorsi

## 2018-10-18 ENCOUNTER — HOSPITAL ENCOUNTER (OUTPATIENT)
Dept: PHYSICAL THERAPY | Age: 63
Setting detail: THERAPIES SERIES
Discharge: HOME | End: 2018-10-18
Payer: COMMERCIAL

## 2018-10-18 PROCEDURE — 97110 THERAPEUTIC EXERCISES: CPT

## 2018-10-18 PROCEDURE — 97140 MANUAL THERAPY 1/> REGIONS: CPT

## 2018-10-18 ASSESSMENT — PAIN SCALES - GENERAL: PAINLEVEL_OUTOF10: 2

## 2018-10-18 ASSESSMENT — PAIN DESCRIPTION - LOCATION: LOCATION: BACK

## 2018-10-18 ASSESSMENT — PAIN DESCRIPTION - ORIENTATION: ORIENTATION: LEFT;UPPER

## 2018-10-18 ASSESSMENT — PAIN DESCRIPTION - DESCRIPTORS: DESCRIPTORS: ACHING;SORE

## 2018-10-18 ASSESSMENT — PAIN DESCRIPTION - PAIN TYPE: TYPE: CHRONIC PAIN

## 2018-10-22 ENCOUNTER — HOSPITAL ENCOUNTER (OUTPATIENT)
Dept: PHYSICAL THERAPY | Age: 63
Setting detail: THERAPIES SERIES
Discharge: HOME | End: 2018-10-22
Payer: COMMERCIAL

## 2018-10-22 PROCEDURE — 97140 MANUAL THERAPY 1/> REGIONS: CPT

## 2018-10-22 PROCEDURE — 97110 THERAPEUTIC EXERCISES: CPT

## 2018-10-22 ASSESSMENT — PAIN DESCRIPTION - ORIENTATION: ORIENTATION: LEFT;UPPER

## 2018-10-22 ASSESSMENT — PAIN DESCRIPTION - PAIN TYPE: TYPE: CHRONIC PAIN

## 2018-10-22 ASSESSMENT — PAIN DESCRIPTION - LOCATION: LOCATION: BACK

## 2018-10-22 ASSESSMENT — PAIN SCALES - GENERAL: PAINLEVEL_OUTOF10: 1

## 2018-10-22 ASSESSMENT — PAIN DESCRIPTION - DESCRIPTORS: DESCRIPTORS: ACHING;SORE

## 2018-10-22 NOTE — PROGRESS NOTES
[facility]   Outpatient Physical Therapy   Treatment Note  [] [address]  [x] [facility]            of [address]  Date: 10/22/2018  Patient: Digna Sampson  : 1955  ACCT #: [de-identified]  Referring Practitioner: Dr. Demarcus Keene  Diagnosis: multiple myeloma     Visit Information:  PT Visit Information  Onset Date: 16  PT Insurance Information: MMO  Total # of Visits Approved: 40  Total # of Visits to Date: 14  No Show: 0  Canceled Appointment: 0  Progress Note Counter: -    SUBJECTIVE:   Subjective  Subjective: Pt reports purchased dorsiflexion strap that attaches to shoe. Started wearing it yesterday. Noticed a difference in my walking. Was on a ladder yesterday changing out some ceining light fixtures  HEP Compliance:  [x] Good [] Fair [] Poor [] Reports not doing due to:    PAIN   Location:   Pain Location: Back  Pain Rating (0-10 pain scale):  Pain Level: 1  Pain Description:  Pain Descriptors: Aching, Sore  Action:  [x] Acceptable for treatment  []  Other:    OBJECTIVE:   Exercises  Exercise 1: wall stands 30s x 3 to increase upright posture  Exercise 2:  YTB lats and rows x 10ea  for thoracic musclature stregthening  Exercise 3: nustep L3 x 10' for ext and trunk ROM, strengthening and endurance  Exercise 4: seated LLE hams str and gastroc str with strap 30s x 3 ea  Exercise 5: Pec stretch in doorway 30s x 3  Exercise 10: single stepping over canes F/L/R x 10 ea  Exercise 17:  left ankle in/ev, circles cw, ccw x10ea for ankle ROM and strengthening  Exercise 18: left ankle abc    Manual: []  NA   Manual therapy  Soft Tissue Mobalization:  postural paraspinals release and MFR to pec muscles .  STM/DTM and releases to thoracic and scapular musclature with pball support to LB with pt seated    Modalities: [x]  NA        Mobility: []  NA    Ambulation 1  Surface: carpet  Device: No Device  Assistance: Independent  Quality of Gait: pt ambulating with improved upright posture, decreased Lt heel strike and foot clearance, improving teri, mild steppage gait pattern   Distance: 50'  Ambulation 2  Surface - 2: carpet  Device 2: No device  Other Apparatus 2: Dorsiflex Assist (wrap )  Assistance 2: Independent  Quality of Gait 2: pt with improved heel strike, teri, and step length with dorsiflexion assist strap  Distance: 50''   Ambulation  More Ambulation?: Yes         HEP  Continue with current Home Exercise Program.  See Objective section for progression of HEP. Comments:       POST-PAIN    Pain Rating (0-10 pain scale): 1-2/10  Location and Pain Description same as pre-pain unless otherwise indicated. Action: [] NA  [] Call Physician  [x] Perform HEP  [] Meds as prescribed     ASSESSMENT:     Conditions Requiring Skilled Therapeutic Intervention  Assessment: Multiple spasms area noted in left scapular region. Used DTM to break up spasms.      Tolerance to treatment:  [x] Good   [] Fair   [] Poor  [] Fatigued   [] Increased pain   Limited by:    Goals:     Short term goals  Time Frame for Short term goals: 3 wks   Short term goal 1: Independent with HEP   Short term goal 2: Report 25% improvement with function and decreased fatigue  Short term goal 3: Improve LE strength 1/2 grade to allow improved gait quality and balance   Long term goals  Time Frame for Long term goals : 6 wks   Long term goal 1: Dhaliwal >/= 50/56 to demonstrate improved static balance and decreased risk for falls   Long term goal 2: DGI >/= 18/24 to demonstrate improved dynamic balance and decreased risk for falls   Long term goal 3: Ambulate >/= 150' with improved step length, foot clearance and upright posture   Long term goal 4: Improve FACT G by 5 points to demonstrate improved overall functional well being   Long term goal 5: Improve Piper Fatigue score by 4 points to demonstrate improved activity tolerance     Progress toward goals:  Goals Met:    []  See updated POC   Comments:    PLAN:  [x] Continue POC to pt tolerance  [] Hold PT for ___ days.   See note to physician  [] Discharge PT    Signature:   Electronically signed by Kennedy Noel PTA on 10/22/18 at 3:17 PM    PT Individual Minutes  Time In: 1500  Time Out: 0457  Minutes: 57   Activity Minutes Units   Ther Ex 42 3   Manual   15  1   Neuro Ed     US         Timed Code Treatment Minutes: 62 Minutes

## 2018-10-25 ENCOUNTER — HOSPITAL ENCOUNTER (OUTPATIENT)
Dept: PHYSICAL THERAPY | Age: 63
Setting detail: THERAPIES SERIES
Discharge: HOME | End: 2018-10-25
Payer: COMMERCIAL

## 2018-10-25 PROCEDURE — 97110 THERAPEUTIC EXERCISES: CPT

## 2018-10-25 PROCEDURE — 97140 MANUAL THERAPY 1/> REGIONS: CPT

## 2018-10-25 ASSESSMENT — PAIN DESCRIPTION - DESCRIPTORS: DESCRIPTORS: ACHING;SORE;TIGHTNESS

## 2018-10-25 ASSESSMENT — PAIN DESCRIPTION - PAIN TYPE: TYPE: CHRONIC PAIN

## 2018-10-25 ASSESSMENT — PAIN SCALES - GENERAL: PAINLEVEL_OUTOF10: 1

## 2018-10-25 ASSESSMENT — PAIN DESCRIPTION - ORIENTATION: ORIENTATION: LEFT;UPPER

## 2018-10-25 ASSESSMENT — PAIN DESCRIPTION - LOCATION: LOCATION: BACK

## 2018-10-28 PROBLEM — C90.01 MULTIPLE MYELOMA IN REMISSION (HCC): Status: ACTIVE | Noted: 2018-10-28

## 2018-10-29 ENCOUNTER — HOSPITAL ENCOUNTER (OUTPATIENT)
Dept: PHYSICAL THERAPY | Age: 63
Setting detail: THERAPIES SERIES
Discharge: HOME | End: 2018-10-29
Payer: COMMERCIAL

## 2018-10-29 PROCEDURE — 97110 THERAPEUTIC EXERCISES: CPT

## 2018-10-29 PROCEDURE — 97140 MANUAL THERAPY 1/> REGIONS: CPT

## 2018-10-29 ASSESSMENT — PAIN DESCRIPTION - DESCRIPTORS: DESCRIPTORS: ACHING;SORE

## 2018-10-29 ASSESSMENT — PAIN DESCRIPTION - LOCATION: LOCATION: BACK

## 2018-10-29 ASSESSMENT — PAIN DESCRIPTION - PAIN TYPE: TYPE: CHRONIC PAIN

## 2018-10-29 ASSESSMENT — PAIN SCALES - GENERAL: PAINLEVEL_OUTOF10: 3

## 2018-10-29 ASSESSMENT — PAIN DESCRIPTION - ORIENTATION: ORIENTATION: LEFT;LOWER

## 2018-10-29 NOTE — PROGRESS NOTES
Perform HEP  [] Meds as prescribed     ASSESSMENT:     Conditions Requiring Skilled Therapeutic Intervention  Assessment: Pts treatment modified per tal with increased back pain this date. Pt with decreased pain and increase in upright posture after treatment. Tolerance to treatment:  [x] Good   [] Fair   [] Poor  [] Fatigued   [] Increased pain   Limited by:    Goals:     Short term goals  Time Frame for Short term goals: 3 wks   Short term goal 1: Independent with HEP   Short term goal 2: Report 25% improvement with function and decreased fatigue  Short term goal 3: Improve LE strength 1/2 grade to allow improved gait quality and balance   Long term goals  Time Frame for Long term goals : 6 wks   Long term goal 1: Dhaliwal >/= 50/56 to demonstrate improved static balance and decreased risk for falls   Long term goal 2: DGI >/= 18/24 to demonstrate improved dynamic balance and decreased risk for falls   Long term goal 3: Ambulate >/= 150' with improved step length, foot clearance and upright posture   Long term goal 4: Improve FACT G by 5 points to demonstrate improved overall functional well being   Long term goal 5: Improve Piper Fatigue score by 4 points to demonstrate improved activity tolerance     Progress toward goals: strength  Goals Met:    []  See updated POC   Comments:    PLAN:  [x] Continue POC to pt tolerance  [] Hold PT for ___ days.   See note to physician  [] Discharge PT    Signature:   Electronically signed by Michael Shetty PTA on 10/29/18 at 4:27 PM    PT Individual Minutes  Time In: 1500  Time Out: 2385  Minutes: 54  Timed Code Treatment Minutes: 54 Minutes

## 2018-10-31 ENCOUNTER — HOSPITAL ENCOUNTER (OUTPATIENT)
Dept: PHYSICAL THERAPY | Age: 63
Setting detail: THERAPIES SERIES
Discharge: HOME | End: 2018-10-31
Payer: COMMERCIAL

## 2018-11-01 ENCOUNTER — APPOINTMENT (OUTPATIENT)
Dept: PHYSICAL THERAPY | Age: 63
End: 2018-11-01
Payer: COMMERCIAL

## 2018-11-05 ENCOUNTER — HOSPITAL ENCOUNTER (OUTPATIENT)
Dept: PHYSICAL THERAPY | Age: 63
Setting detail: THERAPIES SERIES
Discharge: HOME | End: 2018-11-05
Payer: COMMERCIAL

## 2018-11-05 PROCEDURE — 97140 MANUAL THERAPY 1/> REGIONS: CPT

## 2018-11-05 PROCEDURE — 97112 NEUROMUSCULAR REEDUCATION: CPT

## 2018-11-05 PROCEDURE — 97110 THERAPEUTIC EXERCISES: CPT

## 2018-11-05 ASSESSMENT — PAIN DESCRIPTION - DESCRIPTORS: DESCRIPTORS: ACHING

## 2018-11-05 ASSESSMENT — PAIN DESCRIPTION - LOCATION: LOCATION: BACK

## 2018-11-05 ASSESSMENT — PAIN DESCRIPTION - PAIN TYPE: TYPE: CHRONIC PAIN

## 2018-11-05 ASSESSMENT — PAIN DESCRIPTION - ORIENTATION: ORIENTATION: LEFT;LOWER

## 2018-11-05 ASSESSMENT — PAIN SCALES - GENERAL: PAINLEVEL_OUTOF10: 1

## 2018-11-05 ASSESSMENT — PAIN DESCRIPTION - FREQUENCY: FREQUENCY: CONTINUOUS

## 2018-11-08 ENCOUNTER — HOSPITAL ENCOUNTER (OUTPATIENT)
Dept: PHYSICAL THERAPY | Age: 63
Setting detail: THERAPIES SERIES
Discharge: HOME | End: 2018-11-08
Payer: COMMERCIAL

## 2018-11-08 PROCEDURE — 97112 NEUROMUSCULAR REEDUCATION: CPT

## 2018-11-08 PROCEDURE — 97140 MANUAL THERAPY 1/> REGIONS: CPT

## 2018-11-08 PROCEDURE — 97110 THERAPEUTIC EXERCISES: CPT

## 2018-11-08 ASSESSMENT — PAIN SCALES - GENERAL: PAINLEVEL_OUTOF10: 1

## 2018-11-08 ASSESSMENT — PAIN DESCRIPTION - FREQUENCY: FREQUENCY: CONTINUOUS

## 2018-11-08 ASSESSMENT — PAIN DESCRIPTION - PAIN TYPE: TYPE: CHRONIC PAIN

## 2018-11-08 ASSESSMENT — PAIN DESCRIPTION - ORIENTATION: ORIENTATION: LEFT;LOWER

## 2018-11-08 ASSESSMENT — PAIN DESCRIPTION - DESCRIPTORS: DESCRIPTORS: ACHING;TIGHTNESS

## 2018-11-08 ASSESSMENT — PAIN DESCRIPTION - LOCATION: LOCATION: BACK

## 2018-11-12 ENCOUNTER — HOSPITAL ENCOUNTER (OUTPATIENT)
Dept: PHYSICAL THERAPY | Age: 63
Setting detail: THERAPIES SERIES
Discharge: HOME | End: 2018-11-12
Payer: COMMERCIAL

## 2018-11-12 PROCEDURE — 97110 THERAPEUTIC EXERCISES: CPT

## 2018-11-12 PROCEDURE — 97140 MANUAL THERAPY 1/> REGIONS: CPT

## 2018-11-12 PROCEDURE — 97112 NEUROMUSCULAR REEDUCATION: CPT

## 2018-11-12 ASSESSMENT — PAIN DESCRIPTION - PAIN TYPE: TYPE: CHRONIC PAIN

## 2018-11-12 ASSESSMENT — PAIN DESCRIPTION - DESCRIPTORS: DESCRIPTORS: ACHING;TIGHTNESS

## 2018-11-12 ASSESSMENT — PAIN SCALES - GENERAL: PAINLEVEL_OUTOF10: 1

## 2018-11-12 ASSESSMENT — PAIN DESCRIPTION - ORIENTATION: ORIENTATION: MID

## 2018-11-12 ASSESSMENT — PAIN DESCRIPTION - FREQUENCY: FREQUENCY: CONTINUOUS

## 2018-11-12 ASSESSMENT — PAIN DESCRIPTION - LOCATION: LOCATION: BACK

## 2018-11-15 ENCOUNTER — HOSPITAL ENCOUNTER (OUTPATIENT)
Dept: PHYSICAL THERAPY | Age: 63
Setting detail: THERAPIES SERIES
Discharge: HOME | End: 2018-11-15
Payer: COMMERCIAL

## 2018-11-15 PROCEDURE — 97150 GROUP THERAPEUTIC PROCEDURES: CPT

## 2018-11-15 PROCEDURE — 97110 THERAPEUTIC EXERCISES: CPT

## 2018-11-15 PROCEDURE — 97112 NEUROMUSCULAR REEDUCATION: CPT

## 2018-11-15 PROCEDURE — 97140 MANUAL THERAPY 1/> REGIONS: CPT

## 2018-11-15 ASSESSMENT — PAIN DESCRIPTION - LOCATION: LOCATION: BACK

## 2018-11-15 ASSESSMENT — PAIN SCALES - GENERAL: PAINLEVEL_OUTOF10: 1

## 2018-11-15 ASSESSMENT — PAIN DESCRIPTION - DESCRIPTORS: DESCRIPTORS: ACHING;TIGHTNESS

## 2018-11-15 NOTE — PLAN OF CARE
yes  [x] no   Long term goal 4: Improve FACT G by 5 points to demonstrate improved overall functional well being  FACT  [x] yes  [] no   Long term goal 5: Improve Piper Fatigue score by 4 points to demonstrate improved activity tolerance  Piper: 4 [x] yes  [] no     Body structures, Functions, Activity limitations: Decreased functional mobility , Decreased ADL status, Decreased ROM, Decreased strength, Decreased sensation, Decreased balance, Decreased endurance, Decreased coordination, Decreased high-level IADLs  Assessment: Pt reports ~80% improvement, but continued difficulty with Lt drop foot and thoracic back issues, especially with reaching over head. Pt has met or partially met all goals and would benefit from continued skilled PT to address remaining deficits to allow pt to return to previous function with minimal difficulty. New Education Provided:  HEP, POC, energy conservation, postural awareness   G-Codes    NA    PLAN: [] Evaluate and Treat  Frequency/Duration:  Plan  Times per week: 1-2  Plan weeks: 3-4  Current Treatment Recommendations: Strengthening, Balance Training, ROM, Functional Mobility Training, Transfer Training, Endurance Training, Gait Training, Stair training, Neuromuscular Re-education, Manual Therapy - Soft Tissue Mobilization, Home Exercise Program, Safety Education & Training, Patient/Caregiver Education & Training, Equipment Evaluation, Education, & procurement, Modalities     Precautions: CA        ? Patient Status:[x] Continue/ Initate plan of Care    [] Discharge PT. Recommend pt continue with HEP. [] Additional visits requested, Please re-certify for additional visits:        Signature: Electronically signed by Vasu Lino PT on 11/15/18 at 4:38 PM      If you have any questions or concerns, please don't hesitate to call.   Thank you for your referral.    I have reviewed this plan of care and certify a need for medically necessary rehabilitation services.     Physician Signature:__________________________________________________________  Date:  Please sign and return

## 2018-11-15 NOTE — PROGRESS NOTES
[facility]   Outpatient Physical Therapy   Treatment Note  [] [address]  [x] [facility]            of [address]  Date: 11/15/2018  Patient: Bright Baker  : 1955  ACCT #: [de-identified]   Referring Practitioner: Dr. Angela Parish  Diagnosis: multiple myeloma      Visit Information:  PT Visit Information  Onset Date: 16  PT Insurance Information: MMO  Total # of Visits Approved: 40  Total # of Visits to Date: 20  No Show: 0  Canceled Appointment: 1  Progress Note Counter:  (1/3- nv)    SUBJECTIVE:   Subjective  Subjective: Pt self reports 80% improvement since beginning tx. States most difficulty with anything overhead and trunk rotation movements.   HEP Compliance:  [x] Good [] Fair [] Poor [] Reports not doing due to:    PAIN   Location:   Pain Location: Back  Pain Rating (0-10 pain scale):  Pain Level: 1  Pain Description:  Pain Descriptors: Aching, Tightness  Action:  [x] Acceptable for treatment  []  Other:    OBJECTIVE:   Exercises  Exercise 1: wall stands 40s x 3 to increase upright posture  Exercise 3: nustep L4 x 10' for ext and trunk ROM, strengthening and endurance - Group 2   Exercise 4: seated LLE hams str and gastroc str with strap 30s x 3 ea - Group 2  Exercise 11:  NDT with 8\" block with pivot x 15 amie  Exercise 17: left ankle 4 way ROM with ytb x 10 ea for strengthening  Exercise 20: HEP: 4  way ankle ytb x 10ea    Manual: []  NA   Manual therapy  Soft Tissue Mobalization: left scap mobs in prone      Mobility: []  NA     Ambulation 1  Surface: carpet  Device: No Device  Assistance: Independent  Quality of Gait: pt with improved foot clearance with dorsi assist, improved upright posture, continued wider ALVA, no LOB or sig pathway deviations, improving overall teri and endurance   Distance: 150'         Stairs  # Steps : 4  Stairs Height: 8\"  Rails: None  Assistance: Independent  Comment: without HR ascending, 1 HR descending with reciprocal pattern     Strength: [] NT  Strength RLE  R Hip Flexion: 5/5  R Knee Flexion: 5/5  R Knee Extension: 5/5  R Ankle Dorsiflexion: 5/5  Strength LLE  L Hip Flexion: 4+/5  L Knee Flexion: 4+/5  L Knee Extension: 5/5  L Ankle Dorsiflexion: 4-/5        ROM: [x] NT    HEP  Continue with current Home Exercise Program.  See Objective section for progression of HEP. Comments:       POST-PAIN    Pain Rating (0-10 pain scale): 0/10  Location and Pain Description same as pre-pain unless otherwise indicated. Action: [] NA  [] Call Physician  [x] Perform HEP  [] Meds as prescribed     ASSESSMENT:     Conditions Requiring Skilled Therapeutic Intervention  Body structures, Functions, Activity limitations: Decreased functional mobility , Decreased ADL status, Decreased ROM, Decreased strength, Decreased sensation, Decreased balance, Decreased endurance, Decreased coordination, Decreased high-level IADLs  Assessment: Pt reports ~80% improvement, but continued difficulty with Lt drop foot and thoracic back issues, especially with reaching over head. Pt has met or partially met all goals and would benefit from continued skilled PT to address remaining deficits to allow pt to return to previous function with minimal difficulty.    Exam: Dhaliwal:/56, DGI: 19/24     Tolerance to treatment:  [x] Good   [] Fair   [] Poor  [] Fatigued   [] Increased pain   Limited by:    Goals:     Short term goals  Time Frame for Short term goals: 3 wks   Short term goal 1: Independent with HEP   Short term goal 2: Report 25% improvement with function and decreased fatigue  Short term goal 3: Improve LE strength 1/2 grade to allow improved gait quality and balance   Long term goals  Time Frame for Long term goals : 6 wks   Long term goal 1: Dhaliwal >/= 50/56 to demonstrate improved static balance and decreased risk for falls   Long term goal 2: DGI >/= 18/24 to demonstrate improved dynamic balance and decreased risk for falls

## 2018-11-19 ENCOUNTER — HOSPITAL ENCOUNTER (OUTPATIENT)
Dept: PHYSICAL THERAPY | Age: 63
Setting detail: THERAPIES SERIES
Discharge: HOME | End: 2018-11-19
Payer: COMMERCIAL

## 2018-11-19 PROCEDURE — 97110 THERAPEUTIC EXERCISES: CPT

## 2018-11-19 PROCEDURE — 97140 MANUAL THERAPY 1/> REGIONS: CPT

## 2018-11-19 ASSESSMENT — PAIN SCALES - GENERAL: PAINLEVEL_OUTOF10: 2

## 2018-11-19 ASSESSMENT — PAIN DESCRIPTION - ORIENTATION: ORIENTATION: MID;LEFT

## 2018-11-19 ASSESSMENT — PAIN DESCRIPTION - PAIN TYPE: TYPE: CHRONIC PAIN

## 2018-11-19 ASSESSMENT — PAIN DESCRIPTION - LOCATION: LOCATION: BACK

## 2018-11-19 ASSESSMENT — PAIN DESCRIPTION - DESCRIPTORS: DESCRIPTORS: ACHING;TIGHTNESS

## 2018-11-20 DIAGNOSIS — C90.01 MULTIPLE MYELOMA IN REMISSION (HCC): ICD-10-CM

## 2018-11-20 LAB
ALBUMIN SERPL-MCNC: 3.8 G/DL (ref 3.9–4.9)
ALP BLD-CCNC: 48 U/L (ref 35–104)
ALT SERPL-CCNC: 6 U/L (ref 0–41)
ANION GAP SERPL CALCULATED.3IONS-SCNC: 11 MEQ/L (ref 7–13)
AST SERPL-CCNC: 18 U/L (ref 0–40)
BILIRUB SERPL-MCNC: 0.4 MG/DL (ref 0–1.2)
BUN BLDV-MCNC: 16 MG/DL (ref 8–23)
CALCIUM SERPL-MCNC: 9 MG/DL (ref 8.6–10.2)
CHLORIDE BLD-SCNC: 107 MEQ/L (ref 98–107)
CO2: 26 MEQ/L (ref 22–29)
CREAT SERPL-MCNC: 1 MG/DL (ref 0.7–1.2)
GFR AFRICAN AMERICAN: >60
GFR NON-AFRICAN AMERICAN: >60
GLOBULIN: 2.9 G/DL (ref 2.3–3.5)
GLUCOSE BLD-MCNC: 89 MG/DL (ref 74–109)
POTASSIUM SERPL-SCNC: 4.5 MEQ/L (ref 3.5–5.1)
SODIUM BLD-SCNC: 144 MEQ/L (ref 132–144)
TOTAL PROTEIN: 6.7 G/DL (ref 6.4–8.1)

## 2018-11-26 ENCOUNTER — HOSPITAL ENCOUNTER (OUTPATIENT)
Dept: PHYSICAL THERAPY | Age: 63
Setting detail: THERAPIES SERIES
Discharge: HOME | End: 2018-11-26
Payer: COMMERCIAL

## 2018-12-03 ENCOUNTER — HOSPITAL ENCOUNTER (OUTPATIENT)
Dept: PHYSICAL THERAPY | Age: 63
Setting detail: THERAPIES SERIES
Discharge: HOME | End: 2018-12-03
Payer: COMMERCIAL

## 2018-12-03 PROCEDURE — 97140 MANUAL THERAPY 1/> REGIONS: CPT

## 2018-12-03 PROCEDURE — 97112 NEUROMUSCULAR REEDUCATION: CPT

## 2018-12-03 PROCEDURE — 97110 THERAPEUTIC EXERCISES: CPT

## 2018-12-03 ASSESSMENT — PAIN DESCRIPTION - ORIENTATION: ORIENTATION: LEFT;UPPER

## 2018-12-03 ASSESSMENT — PAIN SCALES - GENERAL: PAINLEVEL_OUTOF10: 1

## 2018-12-03 ASSESSMENT — PAIN DESCRIPTION - LOCATION: LOCATION: BACK

## 2018-12-03 ASSESSMENT — PAIN DESCRIPTION - DESCRIPTORS: DESCRIPTORS: ACHING

## 2018-12-03 ASSESSMENT — PAIN DESCRIPTION - PAIN TYPE: TYPE: CHRONIC PAIN

## 2018-12-10 ENCOUNTER — HOSPITAL ENCOUNTER (OUTPATIENT)
Dept: PHYSICAL THERAPY | Age: 63
Setting detail: THERAPIES SERIES
Discharge: HOME | End: 2018-12-10
Payer: COMMERCIAL

## 2018-12-10 PROCEDURE — 97140 MANUAL THERAPY 1/> REGIONS: CPT

## 2018-12-10 PROCEDURE — G8979 MOBILITY GOAL STATUS: HCPCS

## 2018-12-10 PROCEDURE — 97116 GAIT TRAINING THERAPY: CPT

## 2018-12-10 PROCEDURE — 97112 NEUROMUSCULAR REEDUCATION: CPT

## 2018-12-10 PROCEDURE — G8980 MOBILITY D/C STATUS: HCPCS

## 2018-12-10 ASSESSMENT — PAIN SCALES - GENERAL: PAINLEVEL_OUTOF10: 1

## 2018-12-10 ASSESSMENT — PAIN DESCRIPTION - FREQUENCY: FREQUENCY: CONTINUOUS

## 2018-12-10 ASSESSMENT — PAIN DESCRIPTION - DIRECTION: RADIATING_TOWARDS: DENIES RADIATING PAIN

## 2018-12-10 ASSESSMENT — PAIN DESCRIPTION - DESCRIPTORS: DESCRIPTORS: ACHING;TIGHTNESS

## 2018-12-10 ASSESSMENT — PAIN DESCRIPTION - ORIENTATION: ORIENTATION: LEFT;UPPER

## 2018-12-10 ASSESSMENT — PAIN DESCRIPTION - PAIN TYPE: TYPE: CHRONIC PAIN

## 2018-12-10 ASSESSMENT — PAIN DESCRIPTION - LOCATION: LOCATION: BACK

## 2018-12-10 NOTE — DISCHARGE SUMMARY
[address]   [address] [place] [address]  Phone:  [phone]   Fax:  [phone]    [] Certification  [] Recertification []  Plan of Care  [] Progress Note   [x] Discharge        To:  Referring Practitioner: Dr. Alli Tobar  From:  Claritza Badillo, PT  Patient: Clara Wynn           : 1955  Diagnosis: multiple myeloma           Date: 12/10/2018  Treatment Diagnosis: imbalance, difficulty with gait, decreased ROM, abnormal posture, LE weakness, decreased endurance        Progress Report Period from:   to 12/10/2018    Total # of Visits to Date: 23   No Show: 0    Canceled Appointment: 2     OBJECTIVE:   Short Term Goals - Time Frame for Short term goals: 3 wks     Goals Current/Discharge status  Met   Short term goal 1: Independent with HEP   Independent with given HEP  [x] yes  [] no   Short term goal 2: Report 25% improvement with function and decreased fatigue  Reports 90% improvement with symptoms with improved function and decreased fatigue  [x] yes  [] no   Short term goal 3: Improve LE strength 1/2 grade to allow improved gait quality and balance      Strength LLE  Strength LLE: Exception  L Hip Flexion: 4+/5  L Knee Flexion: 4+/5  L Knee Extension: 5/5  L Ankle Dorsiflexion: 4-/5          [x] yes  [] no     Long Term Goals - Time Frame for Long term goals : 6 wks   Goals Current/ Discharge status Met   Long term goal 1: Dhaliwal >/= 50/56 to demonstrate improved static balance and decreased risk for falls  Dhaliwal Balance Score: 54   [x] yes  [] no   Long term goal 2: DGI >/= 18/24 to demonstrate improved dynamic balance and decreased risk for falls  Dynamic Gait Total Score: 21     [x] yes  [] no   Long term goal 3: Ambulate >/= 150' with improved step length, foot clearance and upright posture  Ambulates with improved step length, foot clearance and heel strike, continued fwd flexed posture, but improved awareness.   Pt uses dorsi-assist  [x] yes  [] no   Long term goal 4: Improve FACT G by 5 points to demonstrate improved overall functional well being  FACTG 83 [x] yes  [] no   Long term goal 5: Improve Piper Fatigue score by 4 points to demonstrate improved activity tolerance  Piper fatigue 6 [x] yes  [] no     Assessment: Pt with sig improvement with strength, balance, and functional mobility. Pt continues to report some difficulties with endurance, but overall ~90% improvement. Pt has been given and is independent with extensive HEP for strengthening, flexibility, balance, and posture. Pt is appropriate for discharge at this time. Recommend continue with HEP for maximum improvement. New Education Provided:    G-Codes  PT G-Codes  Functional Assessment Tool Used: Dhaliwal, DGI, FACTG, fatigue scale   Score: 54/56, 21/24, 83, 6   Functional Limitation: Mobility: Walking and moving around  Mobility: Walking and Moving Around Goal Status (): At least 1 percent but less than 20 percent impaired, limited or restricted  Mobility: Walking and Moving Around Discharge Status (): At least 1 percent but less than 20 percent impaired, limited or restricted    PLAN: [x] Evaluate and Treat  Frequency/Duration:  Plan  Plan Comment: Discharge PT      Precautions:        ?     Patient Status:[] Continue/ Initate plan of Care    [x] Discharge PT. Recommend pt continue with HEP. [] Additional visits requested, Please re-certify for additional visits:        Signature: Electronically signed by Ivelisse Alvarado PT on 12/10/18 at 4:35 PM      If you have any questions or concerns, please don't hesitate to call. Thank you for your referral.    I have reviewed this plan of care and certify a need for medically necessary rehabilitation services.     Physician Signature:__________________________________________________________  Date:  Please sign and return

## 2018-12-18 DIAGNOSIS — C90.01 MULTIPLE MYELOMA IN REMISSION (HCC): ICD-10-CM

## 2018-12-18 LAB
ALBUMIN SERPL-MCNC: 3.8 G/DL (ref 3.9–4.9)
ALP BLD-CCNC: 53 U/L (ref 35–104)
ALT SERPL-CCNC: 9 U/L (ref 0–41)
ANION GAP SERPL CALCULATED.3IONS-SCNC: 12 MEQ/L (ref 7–13)
AST SERPL-CCNC: 15 U/L (ref 0–40)
BILIRUB SERPL-MCNC: 0.4 MG/DL (ref 0–1.2)
BUN BLDV-MCNC: 18 MG/DL (ref 8–23)
CALCIUM SERPL-MCNC: 9 MG/DL (ref 8.6–10.2)
CHLORIDE BLD-SCNC: 108 MEQ/L (ref 98–107)
CO2: 25 MEQ/L (ref 22–29)
CREAT SERPL-MCNC: 1.03 MG/DL (ref 0.7–1.2)
GFR AFRICAN AMERICAN: >60
GFR NON-AFRICAN AMERICAN: >60
GLOBULIN: 2.8 G/DL (ref 2.3–3.5)
GLUCOSE BLD-MCNC: 82 MG/DL (ref 74–109)
POTASSIUM SERPL-SCNC: 4.3 MEQ/L (ref 3.5–5.1)
SODIUM BLD-SCNC: 145 MEQ/L (ref 132–144)
TOTAL PROTEIN: 6.6 G/DL (ref 6.4–8.1)

## 2019-01-17 PROBLEM — M84.48XA PATHOLOGIC LUMBAR VERTEBRAL FRACTURE: Status: ACTIVE | Noted: 2019-01-17

## 2019-01-17 PROBLEM — M84.48XA PATHOLOGIC THORACIC FRACTURE: Status: ACTIVE | Noted: 2019-01-17

## 2019-01-22 DIAGNOSIS — C90.01 MULTIPLE MYELOMA IN REMISSION (HCC): ICD-10-CM

## 2019-01-22 LAB
ALBUMIN SERPL-MCNC: 3.9 G/DL (ref 3.9–4.9)
ALP BLD-CCNC: 52 U/L (ref 35–104)
ALT SERPL-CCNC: <5 U/L (ref 0–41)
ANION GAP SERPL CALCULATED.3IONS-SCNC: 9 MEQ/L (ref 7–13)
AST SERPL-CCNC: 15 U/L (ref 0–40)
BILIRUB SERPL-MCNC: 0.5 MG/DL (ref 0–1.2)
BUN BLDV-MCNC: 17 MG/DL (ref 8–23)
CALCIUM SERPL-MCNC: 9.3 MG/DL (ref 8.6–10.2)
CHLORIDE BLD-SCNC: 103 MEQ/L (ref 98–107)
CO2: 28 MEQ/L (ref 22–29)
CREAT SERPL-MCNC: 1.01 MG/DL (ref 0.7–1.2)
GFR AFRICAN AMERICAN: >60
GFR NON-AFRICAN AMERICAN: >60
GLOBULIN: 2.7 G/DL (ref 2.3–3.5)
GLUCOSE BLD-MCNC: 92 MG/DL (ref 74–109)
POTASSIUM SERPL-SCNC: 4.3 MEQ/L (ref 3.5–5.1)
SODIUM BLD-SCNC: 140 MEQ/L (ref 132–144)
TOTAL PROTEIN: 6.6 G/DL (ref 6.4–8.1)

## 2019-01-24 LAB — IGG: 991 MG/DL (ref 768–1632)

## 2019-01-25 LAB
KAPPA FREE LIGHT CHAINS QNT: 1.79 MG/DL (ref 0.33–1.94)
KAPPA/LAMBDA FREE LIGHT CHAIN RATIO: 1.66 (ref 0.26–1.65)
LAMBDA FREE LIGHT CHAINS QNT: 1.08 MG/DL (ref 0.57–2.63)

## 2019-02-19 DIAGNOSIS — M84.48XD PATHOLOGICAL FRACTURE OF LUMBAR VERTEBRA WITH ROUTINE HEALING, SUBSEQUENT ENCOUNTER: ICD-10-CM

## 2019-02-19 DIAGNOSIS — C90.01 MULTIPLE MYELOMA IN REMISSION (HCC): ICD-10-CM

## 2019-03-20 DIAGNOSIS — C90.01 MULTIPLE MYELOMA IN REMISSION (HCC): ICD-10-CM

## 2019-03-20 LAB
ANION GAP SERPL CALCULATED.3IONS-SCNC: 12 MEQ/L (ref 9–15)
BUN BLDV-MCNC: 21 MG/DL (ref 8–23)
CALCIUM SERPL-MCNC: 8.8 MG/DL (ref 8.5–9.9)
CHLORIDE BLD-SCNC: 106 MEQ/L (ref 95–107)
CO2: 25 MEQ/L (ref 20–31)
CREAT SERPL-MCNC: 1.05 MG/DL (ref 0.7–1.2)
GFR AFRICAN AMERICAN: >60
GFR NON-AFRICAN AMERICAN: >60
GLUCOSE BLD-MCNC: 83 MG/DL (ref 70–99)
POTASSIUM SERPL-SCNC: 3.8 MEQ/L (ref 3.4–4.9)
SODIUM BLD-SCNC: 143 MEQ/L (ref 135–144)

## 2019-06-19 ENCOUNTER — OFFICE VISIT (OUTPATIENT)
Dept: GASTROENTEROLOGY | Age: 64
End: 2019-06-19
Payer: COMMERCIAL

## 2019-06-19 VITALS
OXYGEN SATURATION: 98 % | HEART RATE: 72 BPM | WEIGHT: 179 LBS | BODY MASS INDEX: 25.06 KG/M2 | RESPIRATION RATE: 16 BRPM | HEIGHT: 71 IN

## 2019-06-19 DIAGNOSIS — R19.7 DIARRHEA DUE TO MALABSORPTION: Primary | ICD-10-CM

## 2019-06-19 DIAGNOSIS — K90.9 DIARRHEA DUE TO MALABSORPTION: Primary | ICD-10-CM

## 2019-06-19 PROCEDURE — G8419 CALC BMI OUT NRM PARAM NOF/U: HCPCS | Performed by: SPECIALIST

## 2019-06-19 PROCEDURE — 99204 OFFICE O/P NEW MOD 45 MIN: CPT | Performed by: SPECIALIST

## 2019-06-19 PROCEDURE — 3017F COLORECTAL CA SCREEN DOC REV: CPT | Performed by: SPECIALIST

## 2019-06-19 PROCEDURE — G8427 DOCREV CUR MEDS BY ELIG CLIN: HCPCS | Performed by: SPECIALIST

## 2019-06-19 PROCEDURE — 1036F TOBACCO NON-USER: CPT | Performed by: SPECIALIST

## 2019-06-19 ASSESSMENT — ENCOUNTER SYMPTOMS
DIARRHEA: 1
RESPIRATORY NEGATIVE: 1
RECTAL PAIN: 0
BLOOD IN STOOL: 0
ANAL BLEEDING: 0
EYES NEGATIVE: 1
ABDOMINAL PAIN: 0
ABDOMINAL DISTENTION: 0
CONSTIPATION: 0
NAUSEA: 0
BACK PAIN: 1
VOMITING: 0

## 2019-06-19 NOTE — PROGRESS NOTES
Gastroenterology Clinic Visit    Alma Carroll  [id number]  Chief Complaint   Patient presents with    Follow-up       HPI: 58 y.o. male presents to the clinic with  H/o diarrhea since 2/2017 after he started chemotherapy for Multiple Myloma and has been on Imodium with partial relief. No h/o rectal bleeding,nausea mild,no emesis. Frequency of bm varies from 2-4 to 5to6 a day,has nocturnal bm and has fecal urgency. H/O about 100lb wt loss since chemo was started. past medical history multiple myeloma,GERD. Pt had stool studies in the past and was reportedly neg, tried probiotic with no results. Review of Systems   Constitutional: Negative. HENT: Negative. Eyes: Negative. Respiratory: Negative. Gastrointestinal: Positive for diarrhea. Negative for abdominal distention, abdominal pain, anal bleeding, blood in stool, constipation, nausea, rectal pain and vomiting. Bloating   Genitourinary: Negative. Musculoskeletal: Positive for back pain. Neurological: Negative. Psychiatric/Behavioral: Negative.          Past Medical History:   Diagnosis Date    Cancer ([facility] [address]     Elevated blood sugar     Obesity       Past Surgical History:   Procedure Laterality Date    BONE MARROW BIOPSY      FOOT SURGERY      HERNIA REPAIR      MOUTH SURGERY       Current Outpatient Medications on File Prior to Visit   Medication Sig Dispense Refill    potassium chloride (KLOR-CON M) 10 MEQ extended release tablet TK 1 T PO QID 60 tablet 5    REVLIMID 10 MG chemo capsule Take 1 capsule by mouth daily for 28 days Adult Male/ DxC90.01/ Auth # [id number] 28 capsule 0    acetaminophen (TYLENOL) 325 MG tablet Take 650 mg by mouth      aspirin 81 MG EC tablet Take 81 mg by mouth      vitamin D-3 (CHOLECALCIFEROL) 5000 units TABS Take 5,000 Units by mouth      loperamide (IMODIUM) 2 MG capsule Take 2 mg by mouth      loratadine (CLARITIN) 10 MG tablet Take 10 mg by mouth      Multiple Vitamins-Minerals (MULTIVITAMIN ADULT PO) Take by mouth      calcium carbonate 600 MG TABS tablet Take 4 tablets by mouth daily      pantoprazole sodium (PROTONIX) 40 MG PACK packet Take 1 packet by mouth every morning (before breakfast) 30 each 5     No current facility-administered medications on file prior to visit. History reviewed. No pertinent family history. Social History     Socioeconomic History    Marital status:      Spouse name: None    Number of children: None    Years of education: None    Highest education level: None   Occupational History    None   Social Needs    Financial resource strain: None    Food insecurity:     Worry: None     Inability: None    Transportation needs:     Medical: None     Non-medical: None   Tobacco Use    Smoking status: Former Smoker     Packs/day: 0.25     Years: 4.00     Pack years: 1.00     Last attempt to quit: 1975     Years since quittin.4    Smokeless tobacco: Never Used   Substance and Sexual Activity    Alcohol use: Yes     Alcohol/week: 0.6 - 1.2 oz     Types: 1 - 2 Glasses of wine per week     Comment: Pt. is a social drinker on weekends    Drug use: Yes     Types: Marijuana     Comment:  on the weekends    Sexual activity: Yes     Partners: Female   Lifestyle    Physical activity:     Days per week: None     Minutes per session: None    Stress: None   Relationships    Social connections:     Talks on phone: None     Gets together: None     Attends [employer] service: None     Active member of club or organization: None     Attends meetings of clubs or organizations: None     Relationship status: None    Intimate partner violence:     Fear of current or ex partner: None     Emotionally abused: None     Physically abused: None     Forced sexual activity: None   Other Topics Concern    None   Social History Narrative    None       Pulse 72, resp. rate 16, height 5' 10.5\" (1.791 m), weight 179 lb (81.2 kg), SpO2 98 %.     Physical Exam   Constitutional: He appears well-developed and well-nourished. HENT:   Head: Normocephalic. Mouth/Throat: Oropharynx is clear and moist.   Eyes: Pupils are equal, round, and reactive to light. Cardiovascular: Normal rate, regular rhythm, normal heart sounds and intact distal pulses. Pulmonary/Chest: Effort normal and breath sounds normal.   Dorsal kyphosis. Abdominal: Soft. Bowel sounds are normal.   Neurological: He is alert. Skin: Skin is warm and dry. Psychiatric: He has a normal mood and affect. Laboratory, Pathology, Radiology reviewed indetail with relevant important investigations summarized below:  Lab Results   Component Value Date    WBC 4.1 05/01/2019    HGB 11.9 (A) 05/01/2019    HCT 36.0 (A) 05/01/2019    .3 (H) 12/25/2016     05/01/2019     Lab Results   Component Value Date    ALT 10 05/01/2019    AST 17 05/01/2019    ALKPHOS 46 05/01/2019    BILITOT 0.5 05/01/2019       No results found. Endoscopic investigations:     Assessmentand Plan:  58 y.o. male with h/o diarrhea ,possibility inclue secondary to chemo,IBD OR microscopic colitis, maldigestion or malabsorption , ? Amyloidosis secondary to myleoma. . Will do colonoscopy, stool studies and celiac serology. Diagnosis Orders   1. Diarrhea due to malabsorption  Endoscopy, colon, diagnostic    C Diff Toxin by RT PCR    Fecal lactoferrin    Culture Stool-Salmonella, Shigella, Campy    Ova and parasite screen    pH, stool    Pancreatic elastase, fecal    Stool Culture    Celiac Disease Panel     Return in about 1 month (around 7/17/2019) for Follow Up Visit. Howie Coughlin MD   Staff Gastroenterologist  [facility]    Please note this report has been partially produced using speech recognition software and may cause contain errors related to thatsystem including grammar, punctuation and spelling as well as words and phrases that may seem inappropriate.  If there are questions or concerns please feel free to contact me to clarify.

## 2020-01-15 ENCOUNTER — OFFICE VISIT (OUTPATIENT)
Dept: FAMILY MEDICINE CLINIC | Age: 65
End: 2020-01-15
Payer: MEDICARE

## 2020-01-15 VITALS
HEIGHT: 70 IN | OXYGEN SATURATION: 97 % | TEMPERATURE: 98.3 F | SYSTOLIC BLOOD PRESSURE: 128 MMHG | DIASTOLIC BLOOD PRESSURE: 76 MMHG | BODY MASS INDEX: 25.77 KG/M2 | HEART RATE: 90 BPM | WEIGHT: 180 LBS | RESPIRATION RATE: 18 BRPM

## 2020-01-15 PROCEDURE — G8484 FLU IMMUNIZE NO ADMIN: HCPCS | Performed by: PHYSICIAN ASSISTANT

## 2020-01-15 PROCEDURE — G8417 CALC BMI ABV UP PARAM F/U: HCPCS | Performed by: PHYSICIAN ASSISTANT

## 2020-01-15 PROCEDURE — G8427 DOCREV CUR MEDS BY ELIG CLIN: HCPCS | Performed by: PHYSICIAN ASSISTANT

## 2020-01-15 PROCEDURE — 99213 OFFICE O/P EST LOW 20 MIN: CPT | Performed by: PHYSICIAN ASSISTANT

## 2020-01-15 PROCEDURE — 3017F COLORECTAL CA SCREEN DOC REV: CPT | Performed by: PHYSICIAN ASSISTANT

## 2020-01-15 PROCEDURE — 1123F ACP DISCUSS/DSCN MKR DOCD: CPT | Performed by: PHYSICIAN ASSISTANT

## 2020-01-15 PROCEDURE — 4040F PNEUMOC VAC/ADMIN/RCVD: CPT | Performed by: PHYSICIAN ASSISTANT

## 2020-01-15 PROCEDURE — 1036F TOBACCO NON-USER: CPT | Performed by: PHYSICIAN ASSISTANT

## 2020-01-15 RX ORDER — POTASSIUM CHLORIDE 750 MG/1
TABLET, FILM COATED, EXTENDED RELEASE ORAL
COMMUNITY
Start: 2019-12-17 | End: 2022-06-16 | Stop reason: SDUPTHER

## 2020-01-15 RX ORDER — OMEPRAZOLE 40 MG/1
CAPSULE, DELAYED RELEASE ORAL
COMMUNITY
Start: 2019-12-17 | End: 2021-08-31

## 2020-01-15 RX ORDER — FLUTICASONE PROPIONATE 50 MCG
2 SPRAY, SUSPENSION (ML) NASAL NIGHTLY
Qty: 1 BOTTLE | Refills: 0 | Status: SHIPPED | OUTPATIENT
Start: 2020-01-15 | End: 2021-05-14

## 2020-01-15 RX ORDER — CETIRIZINE HYDROCHLORIDE, PSEUDOEPHEDRINE HYDROCHLORIDE 5; 120 MG/1; MG/1
1 TABLET, FILM COATED, EXTENDED RELEASE ORAL 2 TIMES DAILY PRN
Qty: 60 TABLET | Refills: 0 | Status: CANCELLED | OUTPATIENT
Start: 2020-01-15 | End: 2020-02-14

## 2020-01-15 RX ORDER — CHLORHEXIDINE GLUCONATE 0.12 MG/ML
RINSE ORAL
COMMUNITY
Start: 2020-01-07

## 2020-01-15 RX ORDER — CETIRIZINE HYDROCHLORIDE, PSEUDOEPHEDRINE HYDROCHLORIDE 5; 120 MG/1; MG/1
1 TABLET, FILM COATED, EXTENDED RELEASE ORAL 2 TIMES DAILY
Qty: 20 TABLET | Refills: 0 | Status: SHIPPED | OUTPATIENT
Start: 2020-01-15 | End: 2020-01-25

## 2020-01-15 ASSESSMENT — ENCOUNTER SYMPTOMS
SORE THROAT: 1
RHINORRHEA: 1
ABDOMINAL PAIN: 0
DIARRHEA: 0
COUGH: 0
VOMITING: 0

## 2020-01-15 ASSESSMENT — PATIENT HEALTH QUESTIONNAIRE - PHQ9
SUM OF ALL RESPONSES TO PHQ QUESTIONS 1-9: 0
SUM OF ALL RESPONSES TO PHQ QUESTIONS 1-9: 0
SUM OF ALL RESPONSES TO PHQ9 QUESTIONS 1 & 2: 0
2. FEELING DOWN, DEPRESSED OR HOPELESS: 0
1. LITTLE INTEREST OR PLEASURE IN DOING THINGS: 0

## 2020-01-15 NOTE — PROGRESS NOTES
Subjective     Manish Bright 71 y.o. male presents 1/15/20 with   Chief Complaint   Patient presents with    Otalgia     C/o right ear pain 1x day. Patirnt reports pain radiating down to his throat        Otalgia    There is pain in the right ear. This is a new problem. The current episode started today. The problem occurs constantly. The problem has been gradually worsening. There has been no fever. Associated symptoms include rhinorrhea and a sore throat. Pertinent negatives include no abdominal pain, coughing, diarrhea, ear discharge, headaches, hearing loss, neck pain, rash or vomiting. He has tried nothing for the symptoms. Reviewed the following history:    Past Medical History:   Diagnosis Date    Cancer ([facility] [address]     Elevated blood sugar     Obesity      Past Surgical History:   Procedure Laterality Date    BONE MARROW BIOPSY      FOOT SURGERY      HERNIA REPAIR      MOUTH SURGERY       No family history on file.     Allergies   Allergen Reactions    Iodine      Other reaction(s): GI Upset    Lactose Nausea And Vomiting    Shellfish Allergy Nausea And Vomiting    Shellfish-Derived Products Rash       Current Outpatient Medications   Medication Sig Dispense Refill    omeprazole (PRILOSEC) 40 MG delayed release capsule TAKE 1 CAPSULE BY MOUTH EVERY MORNING BEFORE BREAKFAST      fluticasone (FLONASE) 50 MCG/ACT nasal spray 2 sprays by Nasal route nightly 1 Bottle 0    cetirizine-psuedoephedrine (ZYRTEC-D) 5-120 MG per extended release tablet Take 1 tablet by mouth 2 times daily for 10 days 20 tablet 0    potassium chloride (KLOR-CON M) 10 MEQ extended release tablet TK 1 T PO QID 60 tablet 5    acetaminophen (TYLENOL) 325 MG tablet Take 650 mg by mouth      aspirin 81 MG EC tablet Take 81 mg by mouth      loperamide (IMODIUM) 2 MG capsule Take 2 mg by mouth      loratadine (CLARITIN) 10 MG tablet Take 10 mg by mouth      Multiple Vitamins-Minerals (MULTIVITAMIN ADULT PO) Take by mouth

## 2021-05-14 ENCOUNTER — OFFICE VISIT (OUTPATIENT)
Dept: FAMILY MEDICINE CLINIC | Age: 66
End: 2021-05-14
Payer: MEDICARE

## 2021-05-14 VITALS
HEIGHT: 66 IN | TEMPERATURE: 96.6 F | BODY MASS INDEX: 31.98 KG/M2 | SYSTOLIC BLOOD PRESSURE: 128 MMHG | HEART RATE: 87 BPM | OXYGEN SATURATION: 94 % | WEIGHT: 199 LBS | RESPIRATION RATE: 16 BRPM | DIASTOLIC BLOOD PRESSURE: 70 MMHG

## 2021-05-14 DIAGNOSIS — Z13.220 LIPID SCREENING: ICD-10-CM

## 2021-05-14 DIAGNOSIS — K52.9 CHRONIC DIARRHEA: ICD-10-CM

## 2021-05-14 DIAGNOSIS — C90.01 MULTIPLE MYELOMA IN REMISSION (HCC): Primary | ICD-10-CM

## 2021-05-14 DIAGNOSIS — Z11.59 NEED FOR HEPATITIS C SCREENING TEST: ICD-10-CM

## 2021-05-14 DIAGNOSIS — F43.10 PTSD (POST-TRAUMATIC STRESS DISORDER): ICD-10-CM

## 2021-05-14 DIAGNOSIS — Z12.11 COLON CANCER SCREENING: ICD-10-CM

## 2021-05-14 DIAGNOSIS — Z12.5 PROSTATE CANCER SCREENING: ICD-10-CM

## 2021-05-14 DIAGNOSIS — Z91.81 AT HIGH RISK FOR FALLS: ICD-10-CM

## 2021-05-14 PROCEDURE — G8417 CALC BMI ABV UP PARAM F/U: HCPCS | Performed by: FAMILY MEDICINE

## 2021-05-14 PROCEDURE — 99204 OFFICE O/P NEW MOD 45 MIN: CPT | Performed by: FAMILY MEDICINE

## 2021-05-14 PROCEDURE — 1036F TOBACCO NON-USER: CPT | Performed by: FAMILY MEDICINE

## 2021-05-14 PROCEDURE — 4040F PNEUMOC VAC/ADMIN/RCVD: CPT | Performed by: FAMILY MEDICINE

## 2021-05-14 PROCEDURE — G8427 DOCREV CUR MEDS BY ELIG CLIN: HCPCS | Performed by: FAMILY MEDICINE

## 2021-05-14 PROCEDURE — 3017F COLORECTAL CA SCREEN DOC REV: CPT | Performed by: FAMILY MEDICINE

## 2021-05-14 PROCEDURE — 1123F ACP DISCUSS/DSCN MKR DOCD: CPT | Performed by: FAMILY MEDICINE

## 2021-05-14 RX ORDER — CLONAZEPAM 0.5 MG/1
0.5 TABLET ORAL 2 TIMES DAILY PRN
COMMUNITY

## 2021-05-14 SDOH — ECONOMIC STABILITY: TRANSPORTATION INSECURITY
IN THE PAST 12 MONTHS, HAS THE LACK OF TRANSPORTATION KEPT YOU FROM MEDICAL APPOINTMENTS OR FROM GETTING MEDICATIONS?: NO

## 2021-05-14 SDOH — ECONOMIC STABILITY: FOOD INSECURITY: WITHIN THE PAST 12 MONTHS, THE FOOD YOU BOUGHT JUST DIDN'T LAST AND YOU DIDN'T HAVE MONEY TO GET MORE.: NEVER TRUE

## 2021-05-14 SDOH — ECONOMIC STABILITY: FOOD INSECURITY: WITHIN THE PAST 12 MONTHS, YOU WORRIED THAT YOUR FOOD WOULD RUN OUT BEFORE YOU GOT MONEY TO BUY MORE.: NEVER TRUE

## 2021-05-14 ASSESSMENT — ENCOUNTER SYMPTOMS
ABDOMINAL DISTENTION: 0
BLOOD IN STOOL: 0
ANAL BLEEDING: 0
BACK PAIN: 1
WHEEZING: 0
CONSTIPATION: 0
SHORTNESS OF BREATH: 0
APNEA: 0
CHEST TIGHTNESS: 0
COUGH: 0
DIARRHEA: 1
NAUSEA: 0
ABDOMINAL PAIN: 0
VOMITING: 0

## 2021-05-14 ASSESSMENT — PATIENT HEALTH QUESTIONNAIRE - PHQ9
1. LITTLE INTEREST OR PLEASURE IN DOING THINGS: 1
SUM OF ALL RESPONSES TO PHQ QUESTIONS 1-9: 2
SUM OF ALL RESPONSES TO PHQ QUESTIONS 1-9: 2
SUM OF ALL RESPONSES TO PHQ9 QUESTIONS 1 & 2: 2

## 2021-05-14 NOTE — PROGRESS NOTES
Subjective:      Patient ID: Joe Pina is a 76 y.o. male who presents today for:     Chief Complaint   Patient presents with   [address]     Pt presents to establish care, previous PCP Dr. Soto Willingham Other     would like to discuss PTSD        HPI  Patient is a very pleasant 77-year-old male presents today to establish care. He has had a history of multiple myeloma which is currently in remission that was diagnosed in 2016. His last chemotherapy treatment was over a year ago. Since this diagnosis patient has had multiple medical problems including chronic diarrhea. He states his stools are little more formed now but at its worst patient was having greater than 15 stools per day that were soft and runny in consistency. He was consulted with a gastroenterologist but did not follow through with colonoscopy as directed. He denies any current unintentional weight loss and appetite is adequate. He denies any blood in his stool or abdominal pain. Patient has more fatigue than he had prior to his diagnosis but states that he feels though he is slowly returning to a better state of health. He would also like to discuss PTSD. He feels as though the trials of his multiple myeloma diagnosis have left him with anxiety and panic attacks. He currently uses Klonopin twice daily given to him by his oncologist which is helpful but does not completely eradicate his symptoms. He has never underwent counseling but is open to the idea. He has never used a daily medication such as an SSRI or SNRI. He feels as though his anxiety is typically daily and is worse first thing in the morning and sometimes prevents him from sleeping. He is always had an issue with maintaining sleep for longer than 4 hours at a time.   He feels as though he has a positive outlook and denies any suicidal or  homicidal ideations  Past Medical History:   Diagnosis Date    Cancer [facility])     Multiple Myeloma    Elevated blood sugar     Obesity      Past Surgical History:   Procedure Laterality Date    BONE MARROW BIOPSY      FOOT SURGERY      HERNIA REPAIR      MOUTH SURGERY      PACEMAKER INSERTION  2020     Family History   Problem Relation Age of Onset    Diabetes Mother     Heart Disease Father      Social History     Socioeconomic History    Marital status:      Spouse name: Not on file    Number of children: Not on file    Years of education: Not on file    Highest education level: Not on file   Occupational History    Not on file   Social Needs    Financial resource strain: Not hard at all   [employer] insecurity     Worry: Never true     Inability: Never true   [employer] needs     Medical: No     Non-medical: No   Tobacco Use    Smoking status: Former Smoker     Packs/day: 0.25     Years: 4.00     Pack years: 1.00     Quit date:      Years since quittin.3    Smokeless tobacco: Never Used   Substance and Sexual Activity    Alcohol use: Not Currently     Alcohol/week: 1.0 - 2.0 standard drinks     Types: 1 - 2 Glasses of wine per week     Comment: Pt. is a social drinker on weekends    Drug use: Yes     Types: Marijuana     Comment:  on the weekends    Sexual activity: Yes     Partners: Female   Lifestyle    Physical activity     Days per week: Not on file     Minutes per session: Not on file    Stress: Not on file   Relationships    Social connections     Talks on phone: Not on file     Gets together: Not on file     Attends [employer] service: Not on file     Active member of club or organization: Not on file     Attends meetings of clubs or organizations: Not on file     Relationship status: Not on file    Intimate partner violence     Fear of current or ex partner: Not on file     Emotionally abused: Not on file     Physically abused: Not on file     Forced sexual activity: Not on file   Other Topics Concern    Not on file   Social History Narrative    Not on file     Current Outpatient Medications on File Prior to Visit   Medication Sig Dispense Refill    clonazePAM (KLONOPIN) 0.5 MG tablet Take 0.5 mg by mouth 2 times daily as needed.  Ferrous Sulfate Dried (HIGH POTENCY IRON) 65 MG TABS Take by mouth      omeprazole (PRILOSEC) 40 MG delayed release capsule TAKE 1 CAPSULE BY MOUTH EVERY MORNING BEFORE BREAKFAST      REVLIMID 10 MG chemo capsule Take 1 capsule by mouth daily for 28 days Adult Male/ DxC90.01/ [facility] # [id number] 28 capsule 0    potassium chloride (KLOR-CON M) 10 MEQ extended release tablet TK 1 T PO QID 60 tablet 5    acetaminophen (TYLENOL) 325 MG tablet Take 650 mg by mouth      aspirin 81 MG EC tablet Take 81 mg by mouth      vitamin D-3 (CHOLECALCIFEROL) 5000 units TABS Take 5,000 Units by mouth      loperamide (IMODIUM) 2 MG capsule Take 2 mg by mouth      loratadine (CLARITIN) 10 MG tablet Take 10 mg by mouth      Multiple Vitamins-Minerals (MULTIVITAMIN ADULT PO) Take by mouth      calcium carbonate 600 MG TABS tablet Take 4 tablets by mouth daily      potassium chloride (KLOR-CON) 10 MEQ extended release tablet TAKE 1 TABLET BY MOUTH FOUR TIMES DAILY      chlorhexidine (PERIDEX) 0.12 % solution rinse with 1 (ONE) tablespoon 2 (TWO) times a day      Sod Picosulfate-Mag Ox-Cit Acd (CLENPIQ) 10-3.5-12 MG-GM -GM/160ML SOLN Take as directed (Patient not taking: Reported on 5/14/2021) 320 mL 0    pantoprazole sodium (PROTONIX) 40 MG PACK packet Take 1 packet by mouth every morning (before breakfast) 30 each 5     No current facility-administered medications on file prior to visit. Allergies:  Gluten meal, Iodine, Pollen extract, Lactose, Shellfish allergy, and Shellfish-derived products    Review of Systems   Constitutional: Positive for fatigue. Negative for activity change, appetite change, fever and unexpected weight change. Respiratory: Negative for apnea, cough, chest tightness, shortness of breath and wheezing.     Cardiovascular: Negative for chest pain, palpitations and leg swelling. Gastrointestinal: Positive for diarrhea. Negative for abdominal distention, abdominal pain, anal bleeding, blood in stool, constipation, nausea and vomiting. Musculoskeletal: Positive for back pain and gait problem. Negative for arthralgias. Neurological: Negative for seizures, weakness, numbness and headaches. Psychiatric/Behavioral: Negative for agitation, hallucinations and suicidal ideas. Objective:   /70 (Site: Right Upper Arm, Position: Sitting, Cuff Size: Large Adult)   Pulse 87   Temp 96.6 °F (35.9 °C) (Temporal)   Resp 16   Ht 5' 6.14\" (1.68 m)   Wt 199 lb (90.3 kg)   SpO2 94%   BMI 31.98 kg/m²     Physical Exam  Vitals signs and nursing note reviewed. Constitutional:       General: He is not in acute distress. Appearance: Normal appearance. He is well-developed. He is obese. He is not diaphoretic. HENT:      Head: Normocephalic and atraumatic. Nose: Nose normal.      Mouth/Throat:      Mouth: Mucous membranes are moist.      Pharynx: Oropharynx is clear. Eyes:      Conjunctiva/sclera: Conjunctivae normal.      Pupils: Pupils are equal, round, and reactive to light. Neck:      Musculoskeletal: Normal range of motion and neck supple. Cardiovascular:      Rate and Rhythm: Normal rate and regular rhythm. Heart sounds: Normal heart sounds. No murmur. No friction rub. No gallop. Pulmonary:      Effort: Pulmonary effort is normal. No respiratory distress. Breath sounds: Normal breath sounds. No wheezing or rales. Chest:      Chest wall: No tenderness. Abdominal:      General: Abdomen is flat. Bowel sounds are normal. There is no distension. Palpations: Abdomen is soft. There is no mass. Tenderness: There is no abdominal tenderness. There is no right CVA tenderness, left CVA tenderness, guarding or rebound. Hernia: No hernia is present. Skin:     General: Skin is warm and dry.    Neurological: General: No focal deficit present. Mental Status: He is alert and oriented to person, place, and time. Mental status is at baseline. Psychiatric:         Behavior: Behavior normal.         Thought Content: Thought content normal.         Judgment: Judgment normal.         Assessment & Plan:     1. Multiple myeloma in remission ([facility] [address]  Follow up with oncology as directed     2. Chronic diarrhea  We will check current lab work and have patient follow-up with gastroenterology  - CBC With Auto Differential; Future  - Comprehensive Metabolic Panel; Future  - Onofre Branham MD, Gastroenterology, [place]  - Vitamin B12 & Folate; Future    3. Colon cancer screening  Patient amenable to following through with colonoscopy  - [facility] [place] [place]    4. PTSD (post-traumatic stress disorder)  We will refer to psychology to explore cognitive behavioral therapy prior to initiating medication. Patient agreeable to this plan  - Ambulatory referral to Psychology    5. Need for hepatitis C screening test  - Hepatitis C Antibody; Future    6. Lipid screening  - Lipid Panel; Future    7. Prostate cancer screening  Shared decision making  - PSA Screening; Future      Return in about 1 month (around 6/14/2021), or if symptoms worsen or fail to improve, for AWV. Francine Greene MD            On the basis of positive falls risk screening, assessment and plan is as follows: home safety tips provided.

## 2021-05-21 ENCOUNTER — HOSPITAL ENCOUNTER (OUTPATIENT)
Dept: LAB | Age: 66
Discharge: HOME | End: 2021-05-21
Payer: MEDICARE

## 2021-05-21 DIAGNOSIS — Z11.59 NEED FOR HEPATITIS C SCREENING TEST: ICD-10-CM

## 2021-05-21 DIAGNOSIS — Z12.5 PROSTATE CANCER SCREENING: ICD-10-CM

## 2021-05-21 DIAGNOSIS — Z13.220 LIPID SCREENING: ICD-10-CM

## 2021-05-21 DIAGNOSIS — K52.9 CHRONIC DIARRHEA: ICD-10-CM

## 2021-05-21 LAB
ALBUMIN SERPL-MCNC: 3.7 G/DL (ref 3.5–4.6)
ALP BLD-CCNC: 88 U/L (ref 35–104)
ALT SERPL-CCNC: 12 U/L (ref 0–41)
ANION GAP SERPL CALCULATED.3IONS-SCNC: 11 MEQ/L (ref 9–15)
AST SERPL-CCNC: 19 U/L (ref 0–40)
BASOPHILS ABSOLUTE: 0.1 K/UL (ref 0–0.2)
BASOPHILS RELATIVE PERCENT: 3.7 %
BILIRUB SERPL-MCNC: 0.8 MG/DL (ref 0.2–0.7)
BUN BLDV-MCNC: 14 MG/DL (ref 8–23)
CALCIUM SERPL-MCNC: 9.1 MG/DL (ref 8.5–9.9)
CHLORIDE BLD-SCNC: 108 MEQ/L (ref 95–107)
CHOLESTEROL, TOTAL: 106 MG/DL (ref 0–199)
CO2: 24 MEQ/L (ref 20–31)
CREAT SERPL-MCNC: 1.43 MG/DL (ref 0.7–1.2)
EOSINOPHILS ABSOLUTE: 0.3 K/UL (ref 0–0.7)
EOSINOPHILS RELATIVE PERCENT: 9.6 %
FOLATE: 16 NG/ML (ref 7.3–26.1)
GFR AFRICAN AMERICAN: 59.7
GFR NON-AFRICAN AMERICAN: 49.3
GLOBULIN: 2.7 G/DL (ref 2.3–3.5)
GLUCOSE BLD-MCNC: 94 MG/DL (ref 70–99)
HCT VFR BLD CALC: 38 % (ref 42–52)
HDLC SERPL-MCNC: 58 MG/DL (ref 40–59)
HEMOGLOBIN: 12.7 G/DL (ref 14–18)
HEPATITIS C ANTIBODY INTERPRETATION: NORMAL
LDL CHOLESTEROL CALCULATED: 35 MG/DL (ref 0–129)
LYMPHOCYTES ABSOLUTE: 1 K/UL (ref 1–4.8)
LYMPHOCYTES RELATIVE PERCENT: 28.6 %
MCH RBC QN AUTO: 33.7 PG (ref 27–31.3)
MCHC RBC AUTO-ENTMCNC: 33.5 % (ref 33–37)
MCV RBC AUTO: 100.6 FL (ref 80–100)
MONOCYTES ABSOLUTE: 0.5 K/UL (ref 0.2–0.8)
MONOCYTES RELATIVE PERCENT: 15.4 %
NEUTROPHILS ABSOLUTE: 1.5 K/UL (ref 1.4–6.5)
NEUTROPHILS RELATIVE PERCENT: 42.7 %
PDW BLD-RTO: 16.2 % (ref 11.5–14.5)
PLATELET # BLD: 117 K/UL (ref 130–400)
POTASSIUM SERPL-SCNC: 4.3 MEQ/L (ref 3.4–4.9)
PROSTATE SPECIFIC ANTIGEN: 2.11 NG/ML (ref 0–5.4)
RBC # BLD: 3.78 M/UL (ref 4.7–6.1)
SODIUM BLD-SCNC: 143 MEQ/L (ref 135–144)
TOTAL PROTEIN: 6.4 G/DL (ref 6.3–8)
TRIGL SERPL-MCNC: 64 MG/DL (ref 0–150)
VITAMIN B-12: 248 PG/ML (ref 232–1245)
WBC # BLD: 3.5 K/UL (ref 4.8–10.8)

## 2021-05-21 PROCEDURE — 85025 COMPLETE CBC W/AUTO DIFF WBC: CPT

## 2021-05-21 PROCEDURE — 82746 ASSAY OF FOLIC ACID SERUM: CPT

## 2021-05-21 PROCEDURE — 80053 COMPREHEN METABOLIC PANEL: CPT

## 2021-05-21 PROCEDURE — 36415 COLL VENOUS BLD VENIPUNCTURE: CPT

## 2021-05-21 PROCEDURE — G0103 PSA SCREENING: HCPCS

## 2021-05-21 PROCEDURE — 80061 LIPID PANEL: CPT

## 2021-05-21 PROCEDURE — 82607 VITAMIN B-12: CPT

## 2021-05-21 PROCEDURE — 86803 HEPATITIS C AB TEST: CPT

## 2021-05-21 PROCEDURE — 84153 ASSAY OF PSA TOTAL: CPT

## 2021-05-25 DIAGNOSIS — N17.9 ACUTE RENAL FAILURE, UNSPECIFIED ACUTE RENAL FAILURE TYPE (HCC): Primary | ICD-10-CM

## 2021-06-15 ENCOUNTER — OFFICE VISIT (OUTPATIENT)
Dept: FAMILY MEDICINE CLINIC | Age: 66
End: 2021-06-15
Payer: MEDICARE

## 2021-06-15 VITALS
HEART RATE: 76 BPM | RESPIRATION RATE: 16 BRPM | HEIGHT: 68 IN | BODY MASS INDEX: 30.92 KG/M2 | SYSTOLIC BLOOD PRESSURE: 118 MMHG | TEMPERATURE: 97.9 F | WEIGHT: 204 LBS | DIASTOLIC BLOOD PRESSURE: 68 MMHG

## 2021-06-15 DIAGNOSIS — C79.51 SECONDARY MALIGNANT NEOPLASM OF BONE (HCC): ICD-10-CM

## 2021-06-15 DIAGNOSIS — Z13.6 SCREENING FOR AAA (ABDOMINAL AORTIC ANEURYSM): ICD-10-CM

## 2021-06-15 DIAGNOSIS — Z00.00 ROUTINE GENERAL MEDICAL EXAMINATION AT A HEALTH CARE FACILITY: ICD-10-CM

## 2021-06-15 DIAGNOSIS — Z23 NEED FOR PROPHYLACTIC VACCINATION AND INOCULATION AGAINST VARICELLA: ICD-10-CM

## 2021-06-15 DIAGNOSIS — R94.31 ABNORMAL EKG: Primary | ICD-10-CM

## 2021-06-15 DIAGNOSIS — Z23 NEED FOR PROPHYLACTIC VACCINATION AGAINST DIPHTHERIA-TETANUS-PERTUSSIS (DTP): ICD-10-CM

## 2021-06-15 DIAGNOSIS — Z23 NEED FOR PROPHYLACTIC VACCINATION AGAINST STREPTOCOCCUS PNEUMONIAE (PNEUMOCOCCUS): ICD-10-CM

## 2021-06-15 PROBLEM — D68.9 COAGULATION DEFECT (HCC): Status: ACTIVE | Noted: 2021-06-15

## 2021-06-15 PROCEDURE — 3017F COLORECTAL CA SCREEN DOC REV: CPT | Performed by: FAMILY MEDICINE

## 2021-06-15 PROCEDURE — G0009 ADMIN PNEUMOCOCCAL VACCINE: HCPCS | Performed by: FAMILY MEDICINE

## 2021-06-15 PROCEDURE — G0438 PPPS, INITIAL VISIT: HCPCS | Performed by: FAMILY MEDICINE

## 2021-06-15 PROCEDURE — 4040F PNEUMOC VAC/ADMIN/RCVD: CPT | Performed by: FAMILY MEDICINE

## 2021-06-15 PROCEDURE — 1123F ACP DISCUSS/DSCN MKR DOCD: CPT | Performed by: FAMILY MEDICINE

## 2021-06-15 PROCEDURE — 90732 PPSV23 VACC 2 YRS+ SUBQ/IM: CPT | Performed by: FAMILY MEDICINE

## 2021-06-15 PROCEDURE — 93000 ELECTROCARDIOGRAM COMPLETE: CPT | Performed by: FAMILY MEDICINE

## 2021-06-15 ASSESSMENT — PATIENT HEALTH QUESTIONNAIRE - PHQ9
SUM OF ALL RESPONSES TO PHQ QUESTIONS 1-9: 2
1. LITTLE INTEREST OR PLEASURE IN DOING THINGS: 1
SUM OF ALL RESPONSES TO PHQ QUESTIONS 1-9: 2
2. FEELING DOWN, DEPRESSED OR HOPELESS: 1
2. FEELING DOWN, DEPRESSED OR HOPELESS: 1
SUM OF ALL RESPONSES TO PHQ9 QUESTIONS 1 & 2: 2
1. LITTLE INTEREST OR PLEASURE IN DOING THINGS: 1
1. LITTLE INTEREST OR PLEASURE IN DOING THINGS: 1
SUM OF ALL RESPONSES TO PHQ9 QUESTIONS 1 & 2: 2
SUM OF ALL RESPONSES TO PHQ9 QUESTIONS 1 & 2: 2
2. FEELING DOWN, DEPRESSED OR HOPELESS: 1
SUM OF ALL RESPONSES TO PHQ QUESTIONS 1-9: 2

## 2021-06-15 ASSESSMENT — LIFESTYLE VARIABLES
HOW OFTEN DO YOU HAVE A DRINK CONTAINING ALCOHOL: 0
AUDIT TOTAL SCORE: INCOMPLETE
AUDIT-C TOTAL SCORE: INCOMPLETE
HOW OFTEN DO YOU HAVE A DRINK CONTAINING ALCOHOL: NEVER
HOW OFTEN DO YOU HAVE A DRINK CONTAINING ALCOHOL: 0

## 2021-06-15 NOTE — PATIENT INSTRUCTIONS
(Maybe you're afraid of having pain, losing your independence, or being kept alive by machines.)  · Where would you prefer to die? (Your home? A hospital? A nursing home?)  · Do you want to donate your organs when you die? · Do you want certain [employer] practices performed before you die? When should you call for help? Be sure to contact your doctor if you have any questions. Where can you learn more? Go to [email or web address] and sign in to your [employer] account. Enter R264 in the [employer] E box to learn more about \"Advance Directives: Care Instructions. \"     If you do not have an account, please click on the \"Sign Up Now\" link. Current as of: July 17, 2020               Content Version: 12.8  © 2006-2021 Healthwise, Incorporated. Care instructions adapted under license by [facility] ([facility]). If you have questions about a medical condition or this instruction, always ask your healthcare professional. [address] any warranty or liability for your use of this information. Body Mass Index: Care Instructions  Your Care Instructions     Body mass index (BMI) can help you see if your weight is raising your risk for health problems. It uses a formula to compare how much you weigh with how tall you are. · A BMI lower than 18.5 is considered underweight. · A BMI between 18.5 and 24.9 is considered healthy. · A BMI between 25 and 29.9 is considered overweight. A BMI of 30 or higher is considered obese. If your BMI is in the normal range, it means that you have a lower risk for weight-related health problems. If your BMI is in the overweight or obese range, you may be at increased risk for weight-related health problems, such as high blood pressure, heart disease, stroke, arthritis or joint pain, and diabetes.  If your BMI is in the underweight range, you may be at increased risk for health problems such as fatigue, lower protection (immunity) against illness, muscle loss, bone loss, hair loss, and hormone problems. BMI is just one measure of your risk for weight-related health problems. You may be at higher risk for health problems if you are not active, you eat an unhealthy diet, or you drink too much alcohol or use tobacco products. Follow-up care is a key part of your treatment and safety. Be sure to make and go to all appointments, and call your doctor if you are having problems. It's also a good idea to know your test results and keep a list of the medicines you take. How can you care for yourself at home? · Practice healthy eating habits. This includes eating plenty of fruits, vegetables, whole grains, lean protein, and low-fat dairy. · If your doctor recommends it, get more exercise. Walking is a good choice. Bit by bit, increase the amount you walk every day. Try for at least 30 minutes on most days of the week. · Do not smoke. Smoking can increase your risk for health problems. If you need help quitting, talk to your doctor about stop-smoking programs and medicines. These can increase your chances of quitting for good. · Limit alcohol to 2 drinks a day for men and 1 drink a day for women. Too much alcohol can cause health problems. If you have a BMI higher than 25  · Your doctor may do other tests to check your risk for weight-related health problems. This may include measuring the distance around your waist. A waist measurement of more than 40 inches in men or 35 inches in women can increase the risk of weight-related health problems. · Talk with your doctor about steps you can take to stay healthy or improve your health. You may need to make lifestyle changes to lose weight and stay healthy, such as changing your diet and getting regular exercise. If you have a BMI lower than 18.5  · Your doctor may do other tests to check your risk for health problems. · Talk with your doctor about steps you can take to stay healthy or improve your health. .  · Order or download the FREE \"Exercise & Physical Activity: Your Everyday Guide\" from [employer]. Call 2-818.723.2088 or search [employer] online. · You need 9632-6263 mg of calcium and 1983-9520 IU of vitamin D per day. It is possible to meet your calcium requirement with diet alone, but a vitamin D supplement is usually necessary to meet this goal.  · When exposed to the sun, use a sunscreen that protects against both UVA and UVB radiation with an SPF of 30 or greater. Reapply every 2 to 3 hours or after sweating, drying off with a towel, or swimming. · Always wear a seat belt when traveling in a car. Always wear a helmet when riding a bicycle or motorcycle.

## 2021-06-15 NOTE — PROGRESS NOTES
tablet Take 650 mg by mouth Yes Historical Provider, MD   aspirin 81 MG EC tablet Take 81 mg by mouth Yes Historical Provider, MD   vitamin D-3 (CHOLECALCIFEROL) 5000 units TABS Take 5,000 Units by mouth Yes Historical Provider, MD   loperamide (IMODIUM) 2 MG capsule Take 2 mg by mouth Yes Historical Provider, MD   loratadine (CLARITIN) 10 MG tablet Take 10 mg by mouth Yes Historical Provider, MD   Multiple Vitamins-Minerals (MULTIVITAMIN ADULT PO) Take by mouth Yes Historical Provider, MD   calcium carbonate 600 MG TABS tablet Take 4 tablets by mouth daily Yes Historical Provider, MD   REVLIMID 10 MG chemo capsule Take 1 capsule by mouth daily for 28 days Adult Male/ DxC90.01/ Auth # [id number]  Vida Hines MD         Past Medical History:   Diagnosis Date    Cancer [facility])     Multiple Myeloma    Elevated blood sugar     Obesity        Past Surgical History:   Procedure Laterality Date    BONE MARROW BIOPSY      FOOT SURGERY      HERNIA REPAIR      MOUTH SURGERY      PACEMAKER INSERTION  2020         Family History   Problem Relation Age of Onset    Diabetes Mother     Heart Disease Father        CareTeam (Including outside providers/suppliers regularly involved in providing care):   Patient Care Team:  Mattie Harmon MD as PCP - General (Family Medicine)  Mattie Harmon MD as PCP - [facility] Empaneled Provider    Wt Readings from Last 3 Encounters:   06/15/21 204 lb (92.5 kg)   05/14/21 199 lb (90.3 kg)   01/15/20 180 lb (81.6 kg)     Vitals:    06/15/21 1513   BP: 118/68   Site: Right Upper Arm   Position: Sitting   Cuff Size: Medium Adult   Pulse: 76   Resp: 16   Temp: 97.9 °F (36.6 °C)   TempSrc: Temporal   Weight: 204 lb (92.5 kg)   Height: 5' 8\" (1.727 m)     Body mass index is 31.02 kg/m². Based upon direct observation of the patient, evaluation of cognition reveals recent and remote memory intact. Physical Exam  Vitals and nursing note reviewed.    Constitutional:       General: He is not in Use  Never Used          Alcohol History     Alcohol Use Status  Not Currently Drinks/Week  1-2 Glasses of wine per week Amount  1.0 - 2.0 standard drinks of alcohol/wk Comment  Pt. is a social drinker on weekends          Drug Use     Drug Use Status  Yes Types  Marijuana Comment   on the weekends          Sexual Activity     Sexually Active  Yes Partners  Female               Alcohol Screening:       A score of 8 or more is associated with harmful or hazardous drinking. A score of 13 or more in women, and 15 or more in men, is likely to indicate alcohol dependence. Substance Abuse Interventions:  · No Interventions needed    General Health and ACP:  General  In general, how would you say your health is?: Good  In the past 7 days, have you experienced any of the following?  New or Increased Pain, New or Increased Fatigue, Loneliness, Social Isolation, Stress or Anger?: None of These  Do you get the social and emotional support that you need?: Yes  Do you have a Living Will?: Yes  Advance Directives     Power of [address] [place] [address] Will ACP-Advance Directive ACP-Power of     Not on File Not on File Not on File Not on File      General Health Risk Interventions:  · No interventions needed at this time    Health Habits/Nutrition:  Health Habits/Nutrition  Do you exercise for at least 20 minutes 2-3 times per week?: Yes  Have you lost any weight without trying in the past 3 months?: No  Do you eat only one meal per day?: No  Have you seen the dentist within the past year?: Yes  Body mass index: (!) 31.01  Health Habits/Nutrition Interventions:  · Inadequate physical activity:  patient agrees to exercise for at least 150 minutes/week    Hearing/Vision:   Hearing Screening    125Hz 250Hz 500Hz 1000Hz 2000Hz 3000Hz 4000Hz 6000Hz 8000Hz   Right ear:   25 25 20  40     Left ear:   25 25 25  25       Hearing/Vision  Do you or your family notice any trouble with your hearing that hasn't been managed with hearing aids?: No Do you have difficulty driving, watching TV, or doing any of your daily activities because of your eyesight?: (!) Yes  Have you had an eye exam within the past year?: (!) No  Hearing/Vision Interventions:  · Vision concerns:  patient encouraged to make appointment with his/her eye specialist    Safety:  Safety  Do you have working smoke detectors?: Yes  Have all throw rugs been removed or fastened?: Yes  Do you have non-slip mats or surfaces in all bathtubs/showers?: (!) No  Do all of your stairways have a railing or banister?: Yes  Are your doorways, halls and stairs free of clutter?: Yes  Do you always fasten your seatbelt when you are in a car?: Yes  Safety Interventions:  · Home safety tips provided     Personalized Preventive Plan   Current Health Maintenance Status  Immunization History   Administered Date(s) Administered    Pneumococcal Polysaccharide ([id number]) 06/15/2021        Health Maintenance   Topic Date Due    AAA screen  Never done    COVID-19 Vaccine (1) Never done    DTaP/Tdap/Td vaccine (1 - Tdap) Never done    Shingles Vaccine (1 of 2) Never done    Colon cancer screen colonoscopy  Never done    Annual Wellness Visit (AWV)  Never done    Flu vaccine (Season Ended) 09/01/2021    Pneumococcal 65+ yrs at Risk Vaccine (2 of 2 - PCV13) 06/15/2022    Lipid screen  05/21/2026    Hepatitis C screen  Completed    Hepatitis A vaccine  Aged Out    Hepatitis B vaccine  Aged Out    Hib vaccine  Aged Out    Meningococcal (ACWY) vaccine  Aged Out     Recommendations for [employer] Due: see orders and patient instructions/AVS.  .   Recommended screening schedule for the next 5-10 years is provided to the patient in written form: see Patient Instructions/AVS.    Diagnoses and all orders for this visit:    Abnormal EKG  -     Ambulatory referral to Cardiology    Need for prophylactic vaccination against Streptococcus pneumoniae (pneumococcus)  -     Pneumococcal polysaccharide vaccine Being physically active and gradualy increasing activity levels   · Reduce weight and determine a healthy BMI goal  · Monitor blood pressure and treat if higher than 140/90 mmHg  · Maintain blood total cholesterol levels under 5 mmol/l or 190 mg/dl  · Maintain LDL cholesterol levels under 3.0 mmol/l or 115 mg/dl   · Control blood glucose levels  · Consider taking aspirin (75 mg daily), once blood pressure is controlled   Provided a follow up plan.   Time spent (minutes): 3

## 2021-07-15 ENCOUNTER — OFFICE VISIT (OUTPATIENT)
Dept: GASTROENTEROLOGY | Age: 66
End: 2021-07-15
Payer: MEDICARE

## 2021-07-15 VITALS
HEIGHT: 70 IN | BODY MASS INDEX: 29.35 KG/M2 | SYSTOLIC BLOOD PRESSURE: 118 MMHG | DIASTOLIC BLOOD PRESSURE: 62 MMHG | WEIGHT: 205 LBS

## 2021-07-15 DIAGNOSIS — R19.7 DIARRHEA, UNSPECIFIED TYPE: Primary | ICD-10-CM

## 2021-07-15 DIAGNOSIS — D64.9 ANEMIA, UNSPECIFIED TYPE: ICD-10-CM

## 2021-07-15 DIAGNOSIS — C90.01 MULTIPLE MYELOMA IN REMISSION (HCC): ICD-10-CM

## 2021-07-15 DIAGNOSIS — K21.9 GASTROESOPHAGEAL REFLUX DISEASE, UNSPECIFIED WHETHER ESOPHAGITIS PRESENT: ICD-10-CM

## 2021-07-15 DIAGNOSIS — R19.8 TENESMUS (RECTAL): ICD-10-CM

## 2021-07-15 DIAGNOSIS — Z92.21 STATUS POST CHEMOTHERAPY: ICD-10-CM

## 2021-07-15 PROCEDURE — 1036F TOBACCO NON-USER: CPT | Performed by: INTERNAL MEDICINE

## 2021-07-15 PROCEDURE — G8417 CALC BMI ABV UP PARAM F/U: HCPCS | Performed by: INTERNAL MEDICINE

## 2021-07-15 PROCEDURE — 4040F PNEUMOC VAC/ADMIN/RCVD: CPT | Performed by: INTERNAL MEDICINE

## 2021-07-15 PROCEDURE — 1123F ACP DISCUSS/DSCN MKR DOCD: CPT | Performed by: INTERNAL MEDICINE

## 2021-07-15 PROCEDURE — 3017F COLORECTAL CA SCREEN DOC REV: CPT | Performed by: INTERNAL MEDICINE

## 2021-07-15 PROCEDURE — 99204 OFFICE O/P NEW MOD 45 MIN: CPT | Performed by: INTERNAL MEDICINE

## 2021-07-15 PROCEDURE — G8427 DOCREV CUR MEDS BY ELIG CLIN: HCPCS | Performed by: INTERNAL MEDICINE

## 2021-07-15 RX ORDER — SODIUM, POTASSIUM,MAG SULFATES 17.5-3.13G
SOLUTION, RECONSTITUTED, ORAL ORAL
Qty: 1 BOTTLE | Refills: 0 | Status: SHIPPED | OUTPATIENT
Start: 2021-07-15 | End: 2021-08-31

## 2021-07-15 NOTE — PROGRESS NOTES
Gastroenterology Clinic Visit    Reinaldo Gonzalez  [id number]  Chief Complaint   Patient presents with    New Patient    Diarrhea     HPI: 78 y.o. male presents to the clinic with chronic diarrhea. Patient has history of multiple myeloma (diagnosed in 2016/currently in remission). Last chemotherapy treatment was September 2020. Patient states he had radiation to his chest x1 month and then began chemotherapy. States he began having loose stools after starting chemotherapy. States at that time he had 8-12 loose/watery stools per day. He states over the last 6 months his stools have begun to improve. He still has bowel movements 6-8 times per day. States he has some solid BMs in the morning and as the day goes on they become loose/liquidy again. Patient's main complaint is the urgency of his stools. States he will have episodes of incontinence 2-3 times per month. States he has tried Imodium, taking it 4-6 times per day without much improvement. States he currently uses Imodium once a day now. Additionally, 2-3 times per month he will have the urge to defecate, however nothing happens. Also endorses feeling of incomplete evacuation. He denies constipation, abdominal pain, hematochezia or melena.  -Patient also has history of acid reflux. States his symptoms are currently controlled with omeprazole 40 mg daily. He denies breakthrough symptoms, nausea, vomiting, hematemesis or weight loss.  -He is a former smoker, denies alcohol or illicit drug use.  -Of note, patient states he stopped eating gluten-containing foods around 6 months ago. He is curious whether gluten contributes to his symptoms as well. Previous GI work up/Endoscopic investigations:   None    ROS:  All systems reviewed and are otherwise negative.      Past Medical History:   Diagnosis Date    Cancer [facility])     Multiple Myeloma    Elevated blood sugar     Obesity      Past Surgical History:   Procedure Laterality Date    BONE MARROW BIOPSY      FOOT SURGERY      HERNIA REPAIR      MOUTH SURGERY      PACEMAKER INSERTION  2020     Current Outpatient Medications on File Prior to Visit   Medication Sig Dispense Refill    clonazePAM (KLONOPIN) 0.5 MG tablet Take 0.5 mg by mouth 2 times daily as needed.  Ferrous Sulfate Dried (HIGH POTENCY IRON) 65 MG TABS Take by mouth      potassium chloride (KLOR-CON) 10 MEQ extended release tablet TAKE 1 TABLET BY MOUTH FOUR TIMES DAILY      omeprazole (PRILOSEC) 40 MG delayed release capsule TAKE 1 CAPSULE BY MOUTH EVERY MORNING BEFORE BREAKFAST      chlorhexidine (PERIDEX) 0.12 % solution rinse with 1 (ONE) tablespoon 2 (TWO) times a day      potassium chloride (KLOR-CON M) 10 MEQ extended release tablet TK 1 T PO QID 60 tablet 5    acetaminophen (TYLENOL) 325 MG tablet Take 650 mg by mouth      aspirin 81 MG EC tablet Take 81 mg by mouth      vitamin D-3 (CHOLECALCIFEROL) 5000 units TABS Take 5,000 Units by mouth      loperamide (IMODIUM) 2 MG capsule Take 2 mg by mouth      loratadine (CLARITIN) 10 MG tablet Take 10 mg by mouth      Multiple Vitamins-Minerals (MULTIVITAMIN ADULT PO) Take by mouth      calcium carbonate 600 MG TABS tablet Take 4 tablets by mouth daily      REVLIMID 10 MG chemo capsule Take 1 capsule by mouth daily for 28 days Adult Male/ DxC90.01/ Auth # [id number] 28 capsule 0    Sod Picosulfate-Mag Ox-Cit Acd (CLENPIQ) 10-3.5-12 MG-GM -GM/160ML SOLN Take as directed (Patient not taking: Reported on 7/15/2021) 320 mL 0    pantoprazole sodium (PROTONIX) 40 MG PACK packet Take 1 packet by mouth every morning (before breakfast) (Patient not taking: Reported on 7/15/2021) 30 each 5     No current facility-administered medications on file prior to visit.      Family History   Problem Relation Age of Onset    Diabetes Mother     Heart Disease Father     Colon Cancer Neg Hx      Social History     Socioeconomic History    Marital status:      Spouse name: None    Number of children: None    Years of education: None    Highest education level: None   Occupational History    None   Tobacco Use    Smoking status: Former Smoker     Packs/day: 0.25     Years: 4.00     Pack years: 1.00     Quit date:      Years since quittin.11    Smokeless tobacco: Never Used   Substance and Sexual Activity    Alcohol use: Not Currently     Alcohol/week: 1.0 - 2.0 standard drinks     Types: 1 - 2 Glasses of wine per week     Comment: Pt. is a social drinker on weekends    Drug use: Yes     Types: Marijuana     Comment:  on the weekends    Sexual activity: Yes     Partners: Female   Other Topics Concern    None   Social History Narrative    None     Social Determinants of Health     Financial Resource Strain: Low Risk     Difficulty of Paying Living Expenses: Not hard at all   Food Insecurity: No Food Insecurity    Worried About Running Out of Food in the Last Year: Never true    Richard of Food in the Last Year: Never true   Transportation Needs: No Transportation Needs    Lack of Transportation (Medical): No    Lack of Transportation (Non-Medical): No   Physical Activity:     Days of Exercise per Week:     Minutes of Exercise per Session:    Stress:     Feeling of Stress :    Social Connections:     Frequency of Communication with Friends and Family:     Frequency of Social Gatherings with Friends and Family:     Attends [employer]al Services:     Active Member of Clubs or Organizations:     Attends Club or Organization Meetings:     Marital Status:    Intimate Partner Violence:     Fear of Current or Ex-Partner:     Emotionally Abused:     Physically Abused:     Sexually Abused:        Blood pressure 118/62, height 5' 10\" (1.778 m), weight 205 lb (93 kg). Physical Exam  Constitutional:       General: He is not in acute distress. Appearance: Normal appearance. He is well-developed. Eyes:      General: No scleral icterus.   Cardiovascular:      Rate and Rhythm: Normal rate and regular rhythm. Pulmonary:      Effort: Pulmonary effort is normal.      Breath sounds: Normal breath sounds. Abdominal:      General: Bowel sounds are normal. There is no distension. Palpations: Abdomen is soft. There is no mass. Tenderness: There is no abdominal tenderness. There is no guarding or rebound. Musculoskeletal:         General: Normal range of motion. Lymphadenopathy:      Cervical: No cervical adenopathy. Skin:     General: Skin is warm and dry. Neurological:      Mental Status: He is alert and oriented to person, place, and time. Psychiatric:         Behavior: Behavior normal.         Thought Content: Thought content normal.         Judgment: Judgment normal.       Laboratory, Pathology, Radiology reviewed indetail with relevant important investigations summarized below:  Lab Results   Component Value Date    WBC 3.5 (L) 05/21/2021    HGB 12.7 (L) 05/21/2021    HCT 38.0 (L) 05/21/2021    .6 (H) 05/21/2021     (L) 05/21/2021     Lab Results   Component Value Date    IRON 61 09/05/2017    TIBC 191 09/05/2017    FERRITIN 1,192.0 (H) 09/05/2017     Lab Results   Component Value Date    [id number] 248 05/21/2021      Lab Results   Component Value Date    FOLATE 16.0 05/21/2021     Lab Results   Component Value Date    LABALBU 3.7 05/21/2021      Lab Results   Component Value Date    ALT 12 05/21/2021    AST 19 05/21/2021    ALKPHOS 88 05/21/2021    BILITOT 0.8 (H) 05/21/2021       Assessment and Plan:  78 y.o. male with chronic diarrhea since he began chemotherapy in 2016. Patient has history of multiple myeloma, currently in remission, last chemotherapy treatment was September 2020. Patient states diarrhea began when he started chemotherapy.   Since he stopped chemotherapy in September his bowel movements are improving, however he continues to have 6-8 bowel movements per day, morning bowel movements are soft/formed but progressively become loose throughout the day. He expresses concern due to urgency/incontinence of feces and tenesmus. Additionally, he reports some improvement in his symptoms with avoiding gluten. ? Celiac/gluten intolerance. Additionally, patient has history of GERD, controlled with PPI therapy. Patient is anemic, Hgb slightly low at 12.7, platelets low at 180, WBCs low at 3.5. Suspect this is low d/t bone marrow suppression. 1. Diarrhea, unspecified type  2. Multiple myeloma in remission ([facility] [address]  3. Status post chemotherapy  4. Anemia, unspecified type  5. Gastroesophageal reflux disease, unspecified whether esophagitis present  - EGD with duodenal biopsies and PUSH enteroscopy  - Colonoscopy withrandom colon biopsies  - Check stool calprotectin, stool electrolytes (Potassium, Sodium) and pH  - Celiac serology    Follow-up 2 weeks following endoscopy to discuss results and formulate a plan for treatment    Bhaskar Hogan MD   Staff Gastroenterologist  [facility]    Please note this report has been partially produced using speech recognition software and may cause or contain errors related to thatsystem including grammar, punctuation and spelling as well as words and phrases that may seem inappropriate. If there are questions or concerns please feel free to contact me to clarify.

## 2021-07-15 NOTE — PROGRESS NOTES
Gastroenterology Clinic Visit    Luther Whyte  [id number]  Chief Complaint   Patient presents with    New Patient    Diarrhea     PCP note: He has had a history of multiple myeloma which is currently in remission that was diagnosed in 2016. His last chemotherapy treatment was over a year ago. Spet 2020. Since this diagnosis patient has had multiple medical problems including chronic diarrhea. He states his stools are little more formed now but at its worst patient was having greater than 15 stools per day that were soft and runny in consistency. He was consulted with a gastroenterologist but did not follow through with colonoscopy as directed. He denies any current unintentional weight loss and appetite is adequate. He denies any blood in his stool or abdominal pain. Patient has more fatigue than he had prior to his diagnosis but states that he feels though he is slowly returning to a better state of health. HPI: 78 y.o. male presents to the clinic with ***    1 month of radiation to the chest  Loose stools began after startign chemo. All loose watery, 8-12 times per day    Improved in last 6 months    Some solid BMs, morning BM is formed, still goes 6-8 times per day    Sofetr stools  throughtout the day, then after dinner loose/liquids stools    Problem is the urgency of his stools, has episodes of incontinence 2-3 times per month. Tried imodium, uses once a day now    Use 4-6 times per day in the past without improvement in symptoms. 2-3 times per month will have the urge to dedicate, but nothing happens. Also endorses incomplete evacuation. Has hx of relux, takes omeparzole 40 daily, denies any current symptoms.     No ETOH use,     Stopped eating gluten ~ 6 months ago      Previous GI work up/Endoscopic investigations:   none    Review of Systems     Past Medical History:   Diagnosis Date    Cancer ([facility])     Multiple Myeloma    Elevated blood sugar     Obesity      Past Surgical History:   Procedure Laterality Date    BONE MARROW BIOPSY      FOOT SURGERY      HERNIA REPAIR      MOUTH SURGERY      PACEMAKER INSERTION  2020     Current Outpatient Medications on File Prior to Visit   Medication Sig Dispense Refill    clonazePAM (KLONOPIN) 0.5 MG tablet Take 0.5 mg by mouth 2 times daily as needed.  Ferrous Sulfate Dried (HIGH POTENCY IRON) 65 MG TABS Take by mouth      potassium chloride (KLOR-CON) 10 MEQ extended release tablet TAKE 1 TABLET BY MOUTH FOUR TIMES DAILY      omeprazole (PRILOSEC) 40 MG delayed release capsule TAKE 1 CAPSULE BY MOUTH EVERY MORNING BEFORE BREAKFAST      chlorhexidine (PERIDEX) 0.12 % solution rinse with 1 (ONE) tablespoon 2 (TWO) times a day      potassium chloride (KLOR-CON M) 10 MEQ extended release tablet TK 1 T PO QID 60 tablet 5    acetaminophen (TYLENOL) 325 MG tablet Take 650 mg by mouth      aspirin 81 MG EC tablet Take 81 mg by mouth      vitamin D-3 (CHOLECALCIFEROL) 5000 units TABS Take 5,000 Units by mouth      loperamide (IMODIUM) 2 MG capsule Take 2 mg by mouth      loratadine (CLARITIN) 10 MG tablet Take 10 mg by mouth      Multiple Vitamins-Minerals (MULTIVITAMIN ADULT PO) Take by mouth      calcium carbonate 600 MG TABS tablet Take 4 tablets by mouth daily      REVLIMID 10 MG chemo capsule Take 1 capsule by mouth daily for 28 days Adult Male/ DxC90.01/ [facility] # [id number] 28 capsule 0    Sod Picosulfate-Mag Ox-Cit Acd (CLENPIQ) 10-3.5-12 MG-GM -GM/160ML SOLN Take as directed (Patient not taking: Reported on 7/15/2021) 320 mL 0    pantoprazole sodium (PROTONIX) 40 MG PACK packet Take 1 packet by mouth every morning (before breakfast) (Patient not taking: Reported on 7/15/2021) 30 each 5     No current facility-administered medications on file prior to visit.      Family History   Problem Relation Age of Onset    Diabetes Mother     Heart Disease Father     Colon Cancer Neg Hx      Social History     Socioeconomic History    Marital status:      Spouse name: None    Number of children: None    Years of education: None    Highest education level: None   Occupational History    None   Tobacco Use    Smoking status: Former Smoker     Packs/day: 0.25     Years: 4.00     Pack years: 1.00     Quit date:      Years since quittin.11    Smokeless tobacco: Never Used   Substance and Sexual Activity    Alcohol use: Not Currently     Alcohol/week: 1.0 - 2.0 standard drinks     Types: 1 - 2 Glasses of wine per week     Comment: Pt. is a social drinker on weekends    Drug use: Yes     Types: Marijuana     Comment:  on the weekends    Sexual activity: Yes     Partners: Female   Other Topics Concern    None   Social History Narrative    None     Social Determinants of Health     Financial Resource Strain: Low Risk     Difficulty of Paying Living Expenses: Not hard at all   Food Insecurity: No Food Insecurity    Worried About Running Out of Food in the Last Year: Never true    Richard of Food in the Last Year: Never true   Transportation Needs: No Transportation Needs    Lack of Transportation (Medical): No    Lack of Transportation (Non-Medical): No   Physical Activity:     Days of Exercise per Week:     Minutes of Exercise per Session:    Stress:     Feeling of Stress :    Social Connections:     Frequency of Communication with Friends and Family:     Frequency of Social Gatherings with Friends and Family:     Attends [employer] Services:     Active Member of Clubs or Organizations:     Attends Club or Organization Meetings:     Marital Status:    Intimate Partner Violence:     Fear of Current or Ex-Partner:     Emotionally Abused:     Physically Abused:     Sexually Abused:        Blood pressure 118/62, height 5' 10\" (1.778 m), weight 205 lb (93 kg).   Physical Exam    Laboratory, Pathology, Radiology reviewed indetail with relevant important investigations summarized below:  Lab Results Component Value Date    WBC 3.5 (L) 05/21/2021    HGB 12.7 (L) 05/21/2021    HCT 38.0 (L) 05/21/2021    .6 (H) 05/21/2021     (L) 05/21/2021     Lab Results   Component Value Date    IRON 61 09/05/2017    TIBC 191 09/05/2017    FERRITIN 1,192.0 (H) 09/05/2017     Lab Results   Component Value Date    [id number] 248 05/21/2021      Lab Results   Component Value Date    FOLATE 16.0 05/21/2021     Lab Results   Component Value Date    LABALBU 3.7 05/21/2021      Lab Results   Component Value Date    ALT 12 05/21/2021    AST 19 05/21/2021    ALKPHOS 88 05/21/2021    BILITOT 0.8 (H) 05/21/2021       No results found. Assessment and Plan:  78 y.o. male with ***  There are no diagnoses linked to this encounter. No follow-ups on file. Data Unavailable     Jhon Everett MD   Staff Gastroenterologist  [facility]    Please note this report has been partially produced using speech recognition software and may cause or contain errors related to thatsystem including grammar, punctuation and spelling as well as words and phrases that may seem inappropriate. If there are questions or concerns please feel free to contact me to clarify.

## 2021-07-19 DIAGNOSIS — R19.7 DIARRHEA, UNSPECIFIED TYPE: ICD-10-CM

## 2021-07-21 LAB
FECAL PH: 5 (ref 5–8.5)
POTASSIUM, STOOL: 47 MMOL/L
SODIUM, FECAL: 37 MMOL/L

## 2021-07-24 LAB — CALPROTECTIN, FECAL: 68 UG/G

## 2021-08-16 ENCOUNTER — ANESTHESIA EVENT (OUTPATIENT)
Dept: ENDOSCOPY | Age: 66
End: 2021-08-16
Payer: MEDICARE

## 2021-08-16 ENCOUNTER — ANCILLARY PROCEDURE (OUTPATIENT)
Dept: ENDOSCOPY | Age: 66
End: 2021-08-16
Attending: INTERNAL MEDICINE
Payer: MEDICARE

## 2021-08-16 ENCOUNTER — ANESTHESIA (OUTPATIENT)
Dept: ENDOSCOPY | Age: 66
End: 2021-08-16
Payer: MEDICARE

## 2021-08-16 ENCOUNTER — HOSPITAL ENCOUNTER (OUTPATIENT)
Age: 66
Setting detail: OUTPATIENT SURGERY
Discharge: HOME | End: 2021-08-16
Attending: INTERNAL MEDICINE | Admitting: INTERNAL MEDICINE
Payer: MEDICARE

## 2021-08-16 VITALS
TEMPERATURE: 97.7 F | SYSTOLIC BLOOD PRESSURE: 105 MMHG | DIASTOLIC BLOOD PRESSURE: 67 MMHG | RESPIRATION RATE: 14 BRPM | OXYGEN SATURATION: 97 %

## 2021-08-16 VITALS
OXYGEN SATURATION: 96 % | DIASTOLIC BLOOD PRESSURE: 68 MMHG | TEMPERATURE: 97.7 F | SYSTOLIC BLOOD PRESSURE: 121 MMHG | BODY MASS INDEX: 29.35 KG/M2 | RESPIRATION RATE: 16 BRPM | WEIGHT: 205 LBS | HEART RATE: 73 BPM | HEIGHT: 70 IN

## 2021-08-16 PROCEDURE — 44361 SMALL BOWEL ENDOSCOPY/BIOPSY: CPT | Performed by: INTERNAL MEDICINE

## 2021-08-16 PROCEDURE — 2709999900 HC NON-CHARGEABLE SUPPLY: Performed by: INTERNAL MEDICINE

## 2021-08-16 PROCEDURE — 3609027000 HC COLONOSCOPY: Performed by: INTERNAL MEDICINE

## 2021-08-16 PROCEDURE — 2500000003 HC RX 250 WO HCPCS: Performed by: NURSE ANESTHETIST, CERTIFIED REGISTERED

## 2021-08-16 PROCEDURE — 6370000000 HC RX 637 (ALT 250 FOR IP): Performed by: INTERNAL MEDICINE

## 2021-08-16 PROCEDURE — 45385 COLONOSCOPY W/LESION REMOVAL: CPT | Performed by: INTERNAL MEDICINE

## 2021-08-16 PROCEDURE — 88342 IMHCHEM/IMCYTCHM 1ST ANTB: CPT

## 2021-08-16 PROCEDURE — 7100000010 HC PHASE II RECOVERY - FIRST 15 MIN: Performed by: INTERNAL MEDICINE

## 2021-08-16 PROCEDURE — 7100000011 HC PHASE II RECOVERY - ADDTL 15 MIN: Performed by: INTERNAL MEDICINE

## 2021-08-16 PROCEDURE — 88341 IMHCHEM/IMCYTCHM EA ADD ANTB: CPT

## 2021-08-16 PROCEDURE — 3700000000 HC ANESTHESIA ATTENDED CARE: Performed by: INTERNAL MEDICINE

## 2021-08-16 PROCEDURE — 6360000002 HC RX W HCPCS: Performed by: NURSE ANESTHETIST, CERTIFIED REGISTERED

## 2021-08-16 PROCEDURE — 45380 COLONOSCOPY AND BIOPSY: CPT | Performed by: INTERNAL MEDICINE

## 2021-08-16 PROCEDURE — 88305 TISSUE EXAM BY PATHOLOGIST: CPT

## 2021-08-16 PROCEDURE — 3700000001 HC ADD 15 MINUTES (ANESTHESIA): Performed by: INTERNAL MEDICINE

## 2021-08-16 PROCEDURE — 2580000003 HC RX 258: Performed by: INTERNAL MEDICINE

## 2021-08-16 PROCEDURE — 3609017100 HC EGD: Performed by: INTERNAL MEDICINE

## 2021-08-16 RX ORDER — LIDOCAINE HYDROCHLORIDE 20 MG/ML
INJECTION, SOLUTION INFILTRATION; PERINEURAL PRN
Status: DISCONTINUED | OUTPATIENT
Start: 2021-08-16 | End: 2021-08-16 | Stop reason: SDUPTHER

## 2021-08-16 RX ORDER — PROPOFOL 10 MG/ML
INJECTION, EMULSION INTRAVENOUS PRN
Status: DISCONTINUED | OUTPATIENT
Start: 2021-08-16 | End: 2021-08-16 | Stop reason: SDUPTHER

## 2021-08-16 RX ORDER — MAGNESIUM HYDROXIDE 1200 MG/15ML
LIQUID ORAL PRN
Status: DISCONTINUED | OUTPATIENT
Start: 2021-08-16 | End: 2021-08-16 | Stop reason: ALTCHOICE

## 2021-08-16 RX ORDER — SODIUM CHLORIDE 9 MG/ML
INJECTION, SOLUTION INTRAVENOUS
Status: DISCONTINUED
Start: 2021-08-16 | End: 2021-08-16 | Stop reason: HOSPADM

## 2021-08-16 RX ORDER — SODIUM CHLORIDE 9 MG/ML
INJECTION, SOLUTION INTRAVENOUS CONTINUOUS
Status: DISCONTINUED | OUTPATIENT
Start: 2021-08-16 | End: 2021-08-16 | Stop reason: HOSPADM

## 2021-08-16 RX ADMIN — SODIUM CHLORIDE: 9 INJECTION, SOLUTION INTRAVENOUS at 11:21

## 2021-08-16 RX ADMIN — PROPOFOL 50 MG: 10 INJECTION, EMULSION INTRAVENOUS at 11:55

## 2021-08-16 RX ADMIN — PROPOFOL 100 MG: 10 INJECTION, EMULSION INTRAVENOUS at 11:43

## 2021-08-16 RX ADMIN — PROPOFOL 50 MG: 10 INJECTION, EMULSION INTRAVENOUS at 12:01

## 2021-08-16 RX ADMIN — LIDOCAINE HYDROCHLORIDE 50 MG: 20 INJECTION, SOLUTION INFILTRATION; PERINEURAL at 11:43

## 2021-08-16 RX ADMIN — PROPOFOL 50 MG: 10 INJECTION, EMULSION INTRAVENOUS at 11:47

## 2021-08-16 RX ADMIN — PROPOFOL 50 MG: 10 INJECTION, EMULSION INTRAVENOUS at 11:51

## 2021-08-16 RX ADMIN — PROPOFOL 50 MG: 10 INJECTION, EMULSION INTRAVENOUS at 11:57

## 2021-08-16 ASSESSMENT — PULMONARY FUNCTION TESTS
PIF_VALUE: 1
PIF_VALUE: 2
PIF_VALUE: 1
PIF_VALUE: 2
PIF_VALUE: 1
PIF_VALUE: 3
PIF_VALUE: 2
PIF_VALUE: 1
PIF_VALUE: 3
PIF_VALUE: 1
PIF_VALUE: 2
PIF_VALUE: 1
PIF_VALUE: 2
PIF_VALUE: 1

## 2021-08-16 NOTE — ANESTHESIA POSTPROCEDURE EVALUATION
Department of Anesthesiology  Postprocedure Note    Patient: Gail Beltran  MRN: [id number]  YOB: 1955  Date of evaluation: 8/16/2021  Time:  12:11 PM     Procedure Summary     Date: 08/16/21 Room / Location: [address] [place] [address]    Anesthesia Start: 1142 Anesthesia Stop:     Procedures:       EGD with biopsies  DIAGNOSTIC ONLY (N/A )      COLONOSCOPY with polypectomies and biopsies DIAGNOSTIC (N/A ) Diagnosis: (Anemia, diarrhea)    Surgeons: Darek Morris MD Responsible Provider: SARA Wong CRNA    Anesthesia Type: MAC ASA Status: 2          Anesthesia Type: No value filed. Guero Phase I:      Guero Phase II:      Last vitals: Reviewed and per EMR flowsheets.        Anesthesia Post Evaluation    Patient location during evaluation: PACU  Patient participation: complete - patient participated  Level of consciousness: awake  Pain score: 0  Airway patency: patent  Nausea & Vomiting: no nausea  Complications: no  Cardiovascular status: hemodynamically stable  Respiratory status: acceptable  Hydration status: stable

## 2021-08-16 NOTE — H&P
21 Yes Marcos Martin MD   Sod Picosulfate-Mag Ox-Cit Acd [facility]) 10-3.5-12 MG-GM -GM/160ML SOLN Take as directed 19  Yes Gemini Leonardo MD   potassium chloride (KLOR-CON M) 10 MEQ extended release tablet TK 1 T PO QID 19  Yes Marcos Martin MD   aspirin 81 MG EC tablet Take 81 mg by mouth   Yes Historical Provider, MD   vitamin D-3 (CHOLECALCIFEROL) 5000 units TABS Take 5,000 Units by mouth   Yes Historical Provider, MD   Multiple Vitamins-Minerals (MULTIVITAMIN ADULT PO) Take by mouth   Yes Historical Provider, MD   calcium carbonate 600 MG TABS tablet Take 4 tablets by mouth daily   Yes Historical Provider, MD   chlorhexidine (PERIDEX) 0.12 % solution rinse with 1 (ONE) tablespoon 2 (TWO) times a day 20   Historical Provider, MD   acetaminophen (TYLENOL) 325 MG tablet Take 650 mg by mouth    Historical Provider, MD   loperamide (IMODIUM) 2 MG capsule Take 2 mg by mouth    Historical Provider, MD   loratadine (CLARITIN) 10 MG tablet Take 10 mg by mouth    Historical Provider, MD       Allergies:    Allergies   Allergen Reactions    Gluten Meal     Iodine      Other reaction(s): GI Upset    Pollen Extract     Lactose Nausea And Vomiting    Shellfish Allergy Nausea And Vomiting    Shellfish-Derived Products Rash        History of allergic reaction to anesthesia:  No    Past Medical History:  Past Medical History:   Diagnosis Date    Cancer ([facility] [address]     Multiple Myeloma    Elevated blood sugar     Obesity        Past Surgical History:  Past Surgical History:   Procedure Laterality Date    BONE MARROW BIOPSY      FOOT SURGERY      HERNIA REPAIR      MOUTH SURGERY      PACEMAKER INSERTION  2020    PACEMAKER PLACEMENT         Social History:  Social History     Tobacco Use    Smoking status: Former Smoker     Packs/day: 0.25     Years: 4.00     Pack years: 1.00     Quit date:      Years since quittin.6    Smokeless tobacco: Never Used   Substance Use Topics    Alcohol use: Not Currently     Alcohol/week: 1.0 - 2.0 standard drinks     Types: 1 - 2 Glasses of wine per week     Comment: Pt. is a social drinker on weekends    Drug use: Yes     Types: Marijuana     Comment:  on the weekends       Vital Signs:   Vitals:    08/16/21 1105   BP: 137/79   Pulse: 79   Resp: 16   Temp: 97.7 °F (36.5 °C)   SpO2: 100%        Physical Exam:  Cardiac:  [x]WNL []Comments:  Pulmonary:  [x]WNL   []Comments:   Neuro/Mental Status:  [x]WNL  []Comments:  Abdominal:  [x]WNL    []Comments:  Other:   []WNL  []Comments:    Informed Consent:  The risks and benefits of the procedure have been discussed with either the patient or if they cannot consent, their representative. Assessment:  Patient examined and appropriate for planned sedation and procedure. Plan:  Proceed with planned sedation and procedure as above. The patient was counseled at length about risks of melanie COVID-19 in the perioperative and any recovery window from the procedure. The patient was made aware that melanie COVID-19 may worsen their prognosis for recovery from their procedure and lend to a higher morbidity and-all mortality risk. The patient was given the option of postponing the procedure all material risks, benefits, and alternatives were discussed. The patient does wish to proceed with the procedure at this time.     Raheem Jennings MD  11:40 AM

## 2021-08-16 NOTE — ANESTHESIA PRE PROCEDURE
Department of Anesthesiology  Preprocedure Note       Name:  Clive Green   Age:  78 y.o.  :  1955                                          MRN:  [id number]         Date:  2021      Surgeon: Isidro Eaton):  Bhaskar Hogan MD    Procedure: Procedure(s):  EGD DIAGNOSTIC ONLY  COLONOSCOPY DIAGNOSTIC    Medications prior to admission:   Prior to Admission medications    Medication Sig Start Date End Date Taking? Authorizing Provider   Na Sulfate-K Sulfate-Mg Sulf 17.5-3.13-1.6 GM/177ML SOLN As directed 7/15/21  Yes Bhaskar Hogan MD   clonazePAM (KLONOPIN) 0.5 MG tablet Take 0.5 mg by mouth 2 times daily as needed.    Yes Historical Provider, MD   Ferrous Sulfate Dried (HIGH POTENCY IRON) 65 MG TABS Take by mouth   Yes Historical Provider, MD   potassium chloride (KLOR-CON) 10 MEQ extended release tablet TAKE 1 TABLET BY MOUTH FOUR TIMES DAILY 19  Yes Historical Provider, MD   omeprazole (PRILOSEC) 40 MG delayed release capsule TAKE 1 CAPSULE BY MOUTH EVERY MORNING BEFORE BREAKFAST 19  Yes Historical Provider, MD   REVLIMID 10 MG chemo capsule Take 1 capsule by mouth daily for 28 days Adult Male/ DxC90.01/ Auth # [id number] 19 Yes Ketty Shaw MD   Sod Picosulfate-Mag Ox-Cit Acd [facility]) 10-3.5-12 MG-GM -GM/160ML SOLN Take as directed 19  Yes Edvin Reynolds MD   potassium chloride (KLOR-CON M) 10 MEQ extended release tablet TK 1 T PO QID 19  Yes Ketty Shaw MD   aspirin 81 MG EC tablet Take 81 mg by mouth   Yes Historical Provider, MD   vitamin D-3 (CHOLECALCIFEROL) 5000 units TABS Take 5,000 Units by mouth   Yes Historical Provider, MD   Multiple Vitamins-Minerals (MULTIVITAMIN ADULT PO) Take by mouth   Yes Historical Provider, MD   calcium carbonate 600 MG TABS tablet Take 4 tablets by mouth daily   Yes Historical Provider, MD   chlorhexidine (PERIDEX) 0.12 % solution rinse with 1 (ONE) tablespoon 2 (TWO) times a day 20   Historical Provider, MD acetaminophen (TYLENOL) 325 MG tablet Take 650 mg by mouth    Historical Provider, MD   loperamide (IMODIUM) 2 MG capsule Take 2 mg by mouth    Historical Provider, MD   loratadine (CLARITIN) 10 MG tablet Take 10 mg by mouth    Historical Provider, MD       Current medications:    Current Facility-Administered Medications   Medication Dose Route Frequency Provider Last Rate Last Admin    0.9 % sodium chloride infusion   Intravenous Continuous Payton Bell  mL/hr at 21 1121 New Bag at 21 1121       Allergies:     Allergies   Allergen Reactions    Gluten Meal     Iodine      Other reaction(s): GI Upset    Pollen Extract     Lactose Nausea And Vomiting    Shellfish Allergy Nausea And Vomiting    Shellfish-Derived Products Rash       Problem List:    Patient Active Problem List   Diagnosis Code    Multiple myeloma in remission ([facility] [address] C90.01    Pathologic lumbar vertebral fracture M84.48XA    Pathologic thoracic fracture M84.48XA    Diarrhea due to malabsorption K90.9, R19.7    Secondary malignant neoplasm of bone (HCC) C79.51    Coagulation defect ([facility] [address] D68.9       Past Medical History:        Diagnosis Date    Cancer ([facility] [address]     Multiple Myeloma    Elevated blood sugar     Obesity        Past Surgical History:        Procedure Laterality Date    BONE MARROW BIOPSY      FOOT SURGERY      HERNIA REPAIR      MOUTH SURGERY      PACEMAKER INSERTION  2020    PACEMAKER PLACEMENT         Social History:    Social History     Tobacco Use    Smoking status: Former Smoker     Packs/day: 0.25     Years: 4.00     Pack years: 1.00     Quit date:      Years since quittin.6    Smokeless tobacco: Never Used   Substance Use Topics    Alcohol use: Not Currently     Alcohol/week: 1.0 - 2.0 standard drinks     Types: 1 - 2 Glasses of wine per week     Comment: Pt. is a social drinker on weekends                                Counseling given: Not Answered      Vital Signs (Current): Vitals:    08/16/21 1105   BP: 137/79   Pulse: 79   Resp: 16   Temp: 36.5 °C (97.7 °F)   TempSrc: Temporal   SpO2: 100%   Weight: 205 lb (93 kg)   Height: 5' 10\" (1.778 m)                                              BP Readings from Last 3 Encounters:   08/16/21 137/79   07/15/21 118/62   06/15/21 118/68       NPO Status: Time of last liquid consumption: 0500                        Time of last solid consumption: 2030                        Date of last liquid consumption: 08/16/21                        Date of last solid food consumption: 08/14/21    BMI:   Wt Readings from Last 3 Encounters:   08/16/21 205 lb (93 kg)   07/15/21 205 lb (93 kg)   06/15/21 204 lb (92.5 kg)     Body mass index is 29.41 kg/m². CBC:   Lab Results   Component Value Date    WBC 3.5 05/21/2021    RBC 3.78 05/21/2021    HGB 12.7 05/21/2021    HCT 38.0 05/21/2021    .6 05/21/2021    RDW 16.2 05/21/2021     05/21/2021       CMP:   Lab Results   Component Value Date     05/21/2021    K 4.3 05/21/2021     05/21/2021    CO2 24 05/21/2021    BUN 14 05/21/2021    CREATININE 1.43 05/21/2021    GFRAA 59.7 05/21/2021    LABGLOM 49.3 05/21/2021    GLUCOSE 94 05/21/2021    PROT 6.4 05/21/2021    CALCIUM 9.1 05/21/2021    BILITOT 0.8 05/21/2021    ALKPHOS 88 05/21/2021    AST 19 05/21/2021    ALT 12 05/21/2021       POC Tests: No results for input(s): POCGLU, POCNA, POCK, POCCL, POCBUN, POCHEMO, POCHCT in the last 72 hours.     Coags:   Lab Results   Component Value Date    PROTIME 9.9 12/25/2016    INR 0.9 12/25/2016       HCG (If Applicable): No results found for: PREGTESTUR, PREGSERUM, HCG, HCGQUANT     ABGs: No results found for: PHART, PO2ART, HEA9PFD, FKD9VNE, BEART, B0ITJPDW     Type & Screen (If Applicable):  No results found for: LABABO, LABRH    Drug/Infectious Status (If Applicable):  No results found for: HIV, HEPCAB    COVID-19 Screening (If Applicable): No results found for: COVID19        Anesthesia Evaluation    Airway: Mallampati: II        Dental: normal exam         Pulmonary:Negative Pulmonary ROS breath sounds clear to auscultation                             Cardiovascular:Negative CV ROS            Rhythm: regular                      Neuro/Psych:   Negative Neuro/Psych ROS              GI/Hepatic/Renal: Neg GI/Hepatic/Renal ROS            Endo/Other:    (+) blood dyscrasia::., .                  ROS comment: arthritis Abdominal:       Abdomen: soft. Vascular: negative vascular ROS. Other Findings:             Anesthesia Plan      MAC     ASA 2       Induction: intravenous. Anesthetic plan and risks discussed with patient. Plan discussed with attending.                   SARA Yoo - CRNA   8/16/2021

## 2021-08-31 ENCOUNTER — OFFICE VISIT (OUTPATIENT)
Dept: GASTROENTEROLOGY | Age: 66
End: 2021-08-31
Payer: MEDICARE

## 2021-08-31 VITALS — HEIGHT: 70 IN | OXYGEN SATURATION: 100 % | HEART RATE: 79 BPM | BODY MASS INDEX: 29.72 KG/M2 | WEIGHT: 207.6 LBS

## 2021-08-31 DIAGNOSIS — R19.7 DIARRHEA, UNSPECIFIED TYPE: Primary | ICD-10-CM

## 2021-08-31 DIAGNOSIS — K21.9 GASTROESOPHAGEAL REFLUX DISEASE WITHOUT ESOPHAGITIS: ICD-10-CM

## 2021-08-31 PROCEDURE — G8417 CALC BMI ABV UP PARAM F/U: HCPCS | Performed by: NURSE PRACTITIONER

## 2021-08-31 PROCEDURE — 1036F TOBACCO NON-USER: CPT | Performed by: NURSE PRACTITIONER

## 2021-08-31 PROCEDURE — 3017F COLORECTAL CA SCREEN DOC REV: CPT | Performed by: NURSE PRACTITIONER

## 2021-08-31 PROCEDURE — 1123F ACP DISCUSS/DSCN MKR DOCD: CPT | Performed by: NURSE PRACTITIONER

## 2021-08-31 PROCEDURE — 4040F PNEUMOC VAC/ADMIN/RCVD: CPT | Performed by: NURSE PRACTITIONER

## 2021-08-31 PROCEDURE — G8427 DOCREV CUR MEDS BY ELIG CLIN: HCPCS | Performed by: NURSE PRACTITIONER

## 2021-08-31 PROCEDURE — 99214 OFFICE O/P EST MOD 30 MIN: CPT | Performed by: NURSE PRACTITIONER

## 2021-08-31 RX ORDER — OMEPRAZOLE 20 MG/1
20 CAPSULE, DELAYED RELEASE ORAL DAILY
Qty: 30 CAPSULE | Refills: 3 | Status: SHIPPED | OUTPATIENT
Start: 2021-08-31 | End: 2021-12-06

## 2021-08-31 ASSESSMENT — ENCOUNTER SYMPTOMS
VOMITING: 0
RECTAL PAIN: 0
ANAL BLEEDING: 0
VOICE CHANGE: 0
DIARRHEA: 1
CONSTIPATION: 0
EYES NEGATIVE: 1
NAUSEA: 0
ABDOMINAL DISTENTION: 0
BLOOD IN STOOL: 0
ABDOMINAL PAIN: 0
TROUBLE SWALLOWING: 0
CHOKING: 0

## 2021-08-31 NOTE — PROGRESS NOTES
Gastroenterology Clinic Follow up Visit    Rukhsana Moser  [id number]  Chief Complaint   Patient presents with    Follow-up     GERD, diarrhea     HPI: 78 y.o. male following up after last GI clinic on 7/15/2021. Interval change: Patient presents to the GI clinic as follow-up after EGD/colonoscopy on 8/16/2021. He reports he still having mushy to liquid stools up to 6 times daily despite taking an Imodium tablet daily prior to his breakfast.  He denies abdominal pain (other than mild abdominal cramping prior to bowel movements), hematochezia or melena. He endorses drinking 36 ounces of Diet Coke daily. He also endorses taking omeprazole 40 mg daily for longstanding history of reflux. States he currently has not had any breakthrough symptoms while taking omeprazole. He endorses occasional nausea after meals a couple of times per month. He denies vomiting, hematemesis, dysphagia or weight loss. HPI from last GI clinic visit on 7/15/2021  summarized below:  78 y.o. male presents to the clinic with chronic diarrhea. Patient has history of multiple myeloma (diagnosed in 2016/currently in remission). Last chemotherapy treatment was September 2020. Patient states he had radiation to his chest x1 month and then began chemotherapy. States he began having loose stools after starting chemotherapy. States at that time he had 8-12 loose/watery stools per day. He states over the last 6 months his stools have begun to improve. He still has bowel movements 6-8 times per day. States he has some solid BMs in the morning and as the day goes on they become loose/liquidy again. Patient's main complaint is the urgency of his stools. States he will have episodes of incontinence 2-3 times per month. States he has tried Imodium, taking it 4-6 times per day without much improvement. States he currently uses Imodium once a day now.   Additionally, 2-3 times per month he will have the urge to defecate, however nothing happens. Also endorses feeling of incomplete evacuation. He denies constipation, abdominal pain, hematochezia or melena.  -Patient also has history of acid reflux. States his symptoms are currently controlled with omeprazole 40 mg daily. He denies breakthrough symptoms, nausea, vomiting, hematemesis or weight loss.  -He is a former smoker, denies alcohol or illicit drug use.  -Of note, patient states he stopped eating gluten-containing foods around 6 months ago. He is curious whether gluten contributes to his symptoms as well. Previous GI work up/Endoscopic investigations:  8/16/21 EGD: 2 cm hiatal hernia, nonobstructing complete Schatzki's ring, otherwise normal push enteroscopy. Duodenal biopsies-mild chronic duodenitis, villous architecture within normal limits. 8/16/21 Colonoscopy: Cecal polyp, resected and retrieved with cold snare  Sigmoid polyp, resected and retrieved with cold snare, Normal colonic mucosa throughout, Medium-sized internal hemorrhoids. Pathology: Cecal polyptubular adenoma. Random colon biopsiescolonic mucosa with focal mild nonspecific inflammation. 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 Review of Systems   Constitutional: Negative for appetite change, fatigue, fever and unexpected weight change. HENT: Negative for trouble swallowing and voice change. Eyes: Negative. Respiratory: Negative for choking. Cardiovascular: Negative. Gastrointestinal: Positive for diarrhea. Negative for abdominal distention, abdominal pain, anal bleeding, blood in stool, constipation, nausea, rectal pain and vomiting. Endocrine: Negative. Genitourinary: Negative. Musculoskeletal: Negative. Skin: Negative. Allergic/Immunologic: Negative for food allergies. Neurological: Negative. Hematological: Negative.     Psychiatric/Behavioral: Negative for agitation, decreased concentration, self-injury, sleep disturbance and suicidal ideas. The patient is not nervous/anxious. Past medical history, past surgical history, medication list, social and familyhistory reviewed    Pulse 79, height 5' 10\" (1.778 m), weight 207 lb 9.6 oz (94.2 kg), SpO2 100 %. Physical Exam  Constitutional:       General: He is not in acute distress. Appearance: Normal appearance. He is normal weight. He is not ill-appearing. HENT:      Head: Normocephalic and atraumatic. Eyes:      General: No scleral icterus. Cardiovascular:      Rate and Rhythm: Normal rate and regular rhythm. Pulses: Normal pulses. Pulmonary:      Effort: Pulmonary effort is normal. No respiratory distress. Breath sounds: Normal breath sounds. Abdominal:      General: Bowel sounds are normal. There is no distension. Palpations: Abdomen is soft. There is no mass. Tenderness: There is no abdominal tenderness. There is no guarding or rebound. Comments: Central obesity   Musculoskeletal:         General: Normal range of motion. Lymphadenopathy:      Cervical: No cervical adenopathy. Neurological:      Mental Status: He is alert and oriented to person, place, and time. Psychiatric:         Mood and Affect: Mood normal.         Behavior: Behavior normal.         Thought Content:  Thought content normal.         Judgment: Judgment normal.       Laboratory, Pathology, Radiology reviewed in detail with relevantimportant investigations summarized below:    Lab Results   Component Value Date    WBC 3.5 (L) 05/21/2021    HGB 12.7 (L) 05/21/2021    HCT 38.0 (L) 05/21/2021    .6 (H) 05/21/2021     (L) 05/21/2021     Lab Results   Component Value Date    ALT 12 05/21/2021    AST 19 05/21/2021    ALKPHOS 88 05/21/2021    BILITOT 0.8 (H) 05/21/2021     Component      Latest Ref Rng & Units 7/19/2021 7/19/2021 7/19/2021 7/19/2021           1:00 PM  1:00 PM  1:00 PM  1:00 PM   Fecal pH      5.0 - 8.5    5.0   Sodium, Fecal      mmol/L   37    Potassium, Stool      mmol/L  47     Calprotectin, Fecal      <=49 ug/g 68 (H)        Assessment and Plan:  Damon Tomlinson 78 y.o. male with longstanding history of GERD and chronic diarrhea since he began chemotherapy in 2016 for multiple myeloma (currently in remission/last chemo treatment was September 2020). S/p recent EGD revealing 2 cm hiatal hernia, nonobstructing Schatzki's ring and a normal push enteroscopy with duodenal biopsies revealing mild chronic duodenitis. S/p recent colonoscopy revealing cecal and sigmoid polyps, completely resected, no evidence of microscopic colitis with random colon biopsies. Stool sample electrolytes consistent with osmotic diarrhea, upon further questioning patient drinks 36 ounces of Diet Coke daily. 1. Diarrhea, unspecified type  Suspect osmotic diarrhea due to excess artificial sweeteners.  -Recommend substantially decreasing/discontinuing Diet Coke intake.  -Add Metamucil 1 spoonful daily, increase as needed. 2. Gastroesophageal reflux disease without esophagitis  -Prilosec decreased to 20 mg daily, discussed if breakthrough GERD symptoms began to reappear to increase back to 40 mg daily.  - Advised on the correct dose and timing of the PPI, Preferably 1/2 hour before breakfast. If symptoms are worse at night would recommend taking it half an hour before dinner.  - Pathophysiology and etiology of reflux discussed at length, with help of images. - Anti-reflux lifestyle modification discussed at length   --Avoid spicy acidic based foods   --Limit coffee, tea, alcohol use   --Limit the amount of food during meal time, avoid gorging   --Avoid bedtime snacks and eat meals 3 to 4 hrs before lying down   --Elevate the head of the bed with blocks   --Weight reduction advised and discussed at length     Health maintenance: Colonoscopy due 8/2026    Return for Follow up in GI clinic in 4-6 weeks.     SARA Ariza - CNP   Staff Gastroenterology Nurse Practitioner  [id number] [facility] [place] [address]

## 2021-08-31 NOTE — PATIENT INSTRUCTIONS
-Add metamucil 1 spoonful daily, increase as needed. - Cut back/discontinue diet coke.     - Decrease Prilosec (omeprazole) to 20mg daily

## 2021-10-11 ENCOUNTER — OFFICE VISIT (OUTPATIENT)
Dept: GASTROENTEROLOGY | Age: 66
End: 2021-10-11
Payer: MEDICARE

## 2021-10-11 VITALS — HEART RATE: 65 BPM | BODY MASS INDEX: 29.49 KG/M2 | HEIGHT: 70 IN | WEIGHT: 206 LBS | OXYGEN SATURATION: 100 %

## 2021-10-11 DIAGNOSIS — D64.9 ANEMIA, UNSPECIFIED TYPE: ICD-10-CM

## 2021-10-11 DIAGNOSIS — K21.9 GASTROESOPHAGEAL REFLUX DISEASE, UNSPECIFIED WHETHER ESOPHAGITIS PRESENT: ICD-10-CM

## 2021-10-11 DIAGNOSIS — D69.6 THROMBOCYTOPENIA (HCC): ICD-10-CM

## 2021-10-11 DIAGNOSIS — R19.7 DIARRHEA, UNSPECIFIED TYPE: Primary | ICD-10-CM

## 2021-10-11 PROCEDURE — 99214 OFFICE O/P EST MOD 30 MIN: CPT | Performed by: NURSE PRACTITIONER

## 2021-10-11 PROCEDURE — G8417 CALC BMI ABV UP PARAM F/U: HCPCS | Performed by: NURSE PRACTITIONER

## 2021-10-11 PROCEDURE — G8427 DOCREV CUR MEDS BY ELIG CLIN: HCPCS | Performed by: NURSE PRACTITIONER

## 2021-10-11 PROCEDURE — 4040F PNEUMOC VAC/ADMIN/RCVD: CPT | Performed by: NURSE PRACTITIONER

## 2021-10-11 PROCEDURE — G8484 FLU IMMUNIZE NO ADMIN: HCPCS | Performed by: NURSE PRACTITIONER

## 2021-10-11 PROCEDURE — 1123F ACP DISCUSS/DSCN MKR DOCD: CPT | Performed by: NURSE PRACTITIONER

## 2021-10-11 PROCEDURE — 3017F COLORECTAL CA SCREEN DOC REV: CPT | Performed by: NURSE PRACTITIONER

## 2021-10-11 PROCEDURE — 1036F TOBACCO NON-USER: CPT | Performed by: NURSE PRACTITIONER

## 2021-10-11 NOTE — PROGRESS NOTES
Gastroenterology Clinic Follow up Visit    Blayne Cage  [id number]  Chief Complaint   Patient presents with    Follow-up     HPI: 78 y.o. male following up after last GI clinic on 8/31/2021. Interval change: Patient reports continued improvement in his loose/liquid stools. States he is currently taking Imodium once daily, Metamucil twice daily and now has 3-4 formed/brown bowel movements. He reports an occasional \"bad day\" with loose stool, however these are decreasing in frequency. He denies abdominal pain, hematochezia or melena. He denies any GERD symptoms while taking omeprazole 20 mg daily. He denies nausea/vomiting, hematemesis, dysphagia or unintentional weight loss. He is currently on oral iron supplementation, 65 mg daily. Still taking his maintenance chemo pill daily. Follows with Dr. Darwin Johnson regularly. HPI from last GI clinic visit on 8/31/2021  summarized below:  Patient presents to the GI clinic as follow-up after EGD/colonoscopy on 8/16/2021. He reports he still having mushy to liquid stools up to 6 times daily despite taking an Imodium tablet daily prior to his breakfast.  He denies abdominal pain (other than mild abdominal cramping prior to bowel movements), hematochezia or melena. He endorses drinking 36 ounces of Diet Coke daily. He also endorses taking omeprazole 40 mg daily for longstanding history of reflux. States he currently has not had any breakthrough symptoms while taking omeprazole. He endorses occasional nausea after meals a couple of times per month. He denies vomiting, hematemesis, dysphagia or weight loss.     HPI from last GI clinic visit on 7/15/2021  summarized below:  78 y. o. male presents to the clinic with chronic diarrhea.  Patient has history of multiple myeloma (diagnosed in 2016/currently in remission).  Last chemotherapy treatment was September 2020. Alston Saint states he had radiation to his chest x1 month and then began chemotherapy.  States he began having loose stools after starting chemotherapy.  States at that time he had 8-12 loose/watery stools per day. Romel Nickerson states over the last 6 months his stools have begun to improve. He still has bowel movements 6-8 times per day. States he has some solid BMs in the morning and as the day goes on they become loose/liquidy again.  Patient's main complaint is the urgency of his stools.  States he will have episodes of incontinence 2-3 times per month.  States he has tried Imodium, taking it 4-6 times per day without much improvement.  States he currently uses Imodium once a day now.  Additionally, 2-3 times per month he will have the urge to defecate, however nothing happens.  Also endorses feeling of incomplete evacuation.  He denies constipation, abdominal pain, hematochezia or melena.  -Patient also has history of acid reflux.  States his symptoms are currently controlled with omeprazole 40 mg daily. Romel Nickerson denies breakthrough symptoms, nausea, vomiting, hematemesis or weight loss.  -He is a former smoker, denies alcohol or illicit drug use.  -Of note, patient states he stopped eating gluten-containing foods around 6 months ago. Romel Nickerson is curious whether gluten contributes to his symptoms as well.     Previous GI work up/Endoscopic investigations:  8/16/21 EGD: 2 cm hiatal hernia, nonobstructing complete Schatzki's ring, otherwise normal push enteroscopy. Duodenal biopsies-mild chronic duodenitis, villous architecture within normal limits. 8/16/21 Colonoscopy: Cecal polyp, resected and retrieved with cold snare  Sigmoid polyp, resected and retrieved with cold snare, Normal colonic mucosa throughout, Medium-sized internal hemorrhoids. Pathology: Cecal polyp-tubular adenoma. Random colon biopsies-colonic mucosa with focal mild nonspecific inflammation. 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 Review of Systems   All other systems reviewed and are negative.      Past medical history, past surgical history, medication list, social and familyhistory reviewed    Pulse 65, height 5' 10\" (1.778 m), weight 206 lb (93.4 kg), SpO2 100 %. Physical Exam  Constitutional:       General: He is not in acute distress. Appearance: Normal appearance. He is normal weight. He is not ill-appearing. HENT:      Head: Normocephalic and atraumatic. Eyes:      General: No scleral icterus. Cardiovascular:      Rate and Rhythm: Normal rate and regular rhythm. Pulses: Normal pulses. Pulmonary:      Effort: Pulmonary effort is normal. No respiratory distress. Breath sounds: Normal breath sounds. Abdominal:      General: Bowel sounds are normal. There is no distension. Palpations: Abdomen is soft. There is no mass. Tenderness: There is no abdominal tenderness. There is no guarding or rebound. Comments: Central obesity   Musculoskeletal:         General: Normal range of motion. Lymphadenopathy:      Cervical: No cervical adenopathy. Neurological:      Mental Status: He is alert and oriented to person, place, and time. Psychiatric:         Mood and Affect: Mood normal.         Behavior: Behavior normal.         Thought Content: Thought content normal.         Judgment: Judgment normal.       Laboratory, Pathology, Radiology reviewed in detail with relevantimportant investigations summarized below:    No results for input(s): WBC, HGB, HCT, MCV, PLT in the last 720 hours. Lab Results   Component Value Date    WBC 3.5 (L) 05/21/2021    HGB 12.7 (L) 05/21/2021    HCT 38.0 (L) 05/21/2021    .6 (H) 05/21/2021     (L) 05/21/2021     Lab Results   Component Value Date    ALT 12 05/21/2021    AST 19 05/21/2021    ALKPHOS 88 05/21/2021    BILITOT 0.8 (H) 05/21/2021     CBC from 9/24/2021 at oncologist's office revealing WBCs 4.2, Hgb 12.7, , platelets 85. No recent GI imaging results found.      Assessment and Plan:  Vance Hansen 78 y.o. male with longstanding history of GERD and chronic diarrhea since he began chemotherapy in 2016 for multiple myeloma (currently on maintenance chemo pill daily). Recent EGD revealing 2 cm hiatal hernia, nonobstructing Schatzki's ring and a normal push enteroscopy with duodenal biopsies revealing mild chronic duodenitis. Recent colonoscopy revealing cecal and sigmoid polyps, completely resected, no evidence of microscopic colitis with random colon biopsies. Stool samples at prior visit consistent with osmotic diarrhea. Since prior visit, patient has cut down substantially on the amount of Diet Coke he drinks on a daily basis, taking Imodium and Metamucil with good results. 1. Diarrhea, unspecified type  -Substantial improvement with Imodium, Metamucil and limitation of dietary artificial sweeteners. 2. Gastroesophageal reflux disease, unspecified whether esophagitis present  -Continue Prilosec 20 mg daily.  - Advised on the correct dose and timing of the PPI, Preferably 1/2 hour before breakfast. If symptoms are worse at night would recommend taking it half an hour before dinner.  - Pathophysiology and etiology of reflux discussed at length, with help of images. - Anti-reflux lifestyle modification discussed at length              --Avoid spicy acidic based foods              --Limit coffee, tea, alcohol use              --Limit the amount of food during meal time, avoid gorging              --Avoid bedtime snacks and eat meals 3 to 4 hrs before lying down              --Elevate the head of the bed with blocks              --Weight reduction advised and discussed at length    3. Anemia, unspecified type  4. Thrombocytopenia (HCC)  -Suspect anemia and thrombocytopenia due to oral chemo maintenance pill. However, platelets with substantial drop. ?Hepatic etiology. Will request chemistry/hepatic function tests. Health maintenance: Colonoscopy due 8/2026    Return in about 3 months (around 1/11/2022).     Baldemar Flaherty Joey Fly - CNP   Staff Gastroenterology Nurse Practitioner  [facility]

## 2021-12-06 RX ORDER — OMEPRAZOLE 20 MG/1
20 CAPSULE, DELAYED RELEASE ORAL DAILY
Qty: 30 CAPSULE | Refills: 3 | Status: SHIPPED | OUTPATIENT
Start: 2021-12-06

## 2022-06-01 ENCOUNTER — HOSPITAL ENCOUNTER (OUTPATIENT)
Dept: LAB | Age: 67
Discharge: HOME | End: 2022-06-01
Payer: MEDICARE

## 2022-06-01 LAB
ALBUMIN SERPL-MCNC: 3.8 G/DL (ref 3.5–4.6)
ANION GAP SERPL CALCULATED.3IONS-SCNC: 13 MEQ/L (ref 9–15)
BUN BLDV-MCNC: 16 MG/DL (ref 8–23)
CALCIUM SERPL-MCNC: 9.1 MG/DL (ref 8.5–9.9)
CHLORIDE BLD-SCNC: 104 MEQ/L (ref 95–107)
CO2: 23 MEQ/L (ref 20–31)
CREAT SERPL-MCNC: 1.55 MG/DL (ref 0.7–1.2)
CREATININE URINE: 169.1 MG/DL
GFR AFRICAN AMERICAN: 54.2
GFR NON-AFRICAN AMERICAN: 44.8
GLUCOSE BLD-MCNC: 127 MG/DL (ref 70–99)
HCT VFR BLD CALC: 39.9 % (ref 42–52)
HEMOGLOBIN: 13.1 G/DL (ref 14–18)
MCH RBC QN AUTO: 33.7 PG (ref 27–31.3)
MCHC RBC AUTO-ENTMCNC: 32.8 % (ref 33–37)
MCV RBC AUTO: 102.8 FL (ref 80–100)
MICROALBUMIN UR-MCNC: 4.5 MG/DL
MICROALBUMIN/CREAT UR-RTO: 26.6 MG/G (ref 0–30)
PARATHYROID HORMONE INTACT: 82.9 PG/ML (ref 15–65)
PDW BLD-RTO: 17.3 % (ref 11.5–14.5)
PHOSPHORUS: 2.6 MG/DL (ref 2.3–4.8)
PLATELET # BLD: 110 K/UL (ref 130–400)
POTASSIUM SERPL-SCNC: 4.5 MEQ/L (ref 3.4–4.9)
RBC # BLD: 3.89 M/UL (ref 4.7–6.1)
SODIUM BLD-SCNC: 140 MEQ/L (ref 135–144)
WBC # BLD: 4.2 K/UL (ref 4.8–10.8)

## 2022-06-01 PROCEDURE — 82306 VITAMIN D 25 HYDROXY: CPT

## 2022-06-01 PROCEDURE — 80069 RENAL FUNCTION PANEL: CPT

## 2022-06-01 PROCEDURE — 36415 COLL VENOUS BLD VENIPUNCTURE: CPT

## 2022-06-01 PROCEDURE — 82043 UR ALBUMIN QUANTITATIVE: CPT

## 2022-06-01 PROCEDURE — 82570 ASSAY OF URINE CREATININE: CPT

## 2022-06-01 PROCEDURE — 85027 COMPLETE CBC AUTOMATED: CPT

## 2022-06-01 PROCEDURE — 83970 ASSAY OF PARATHORMONE: CPT

## 2022-06-02 LAB — VITAMIN D 25-HYDROXY: 29.5 NG/ML

## 2022-06-16 ENCOUNTER — OFFICE VISIT (OUTPATIENT)
Dept: FAMILY MEDICINE CLINIC | Age: 67
End: 2022-06-16
Payer: MEDICARE

## 2022-06-16 VITALS
HEART RATE: 87 BPM | HEIGHT: 70 IN | WEIGHT: 202.8 LBS | SYSTOLIC BLOOD PRESSURE: 134 MMHG | OXYGEN SATURATION: 98 % | BODY MASS INDEX: 29.03 KG/M2 | TEMPERATURE: 95.9 F | DIASTOLIC BLOOD PRESSURE: 70 MMHG | RESPIRATION RATE: 16 BRPM

## 2022-06-16 DIAGNOSIS — D69.6 THROMBOCYTOPENIA (HCC): ICD-10-CM

## 2022-06-16 DIAGNOSIS — C79.51 SECONDARY MALIGNANT NEOPLASM OF BONE (HCC): ICD-10-CM

## 2022-06-16 DIAGNOSIS — Z00.00 MEDICARE ANNUAL WELLNESS VISIT, SUBSEQUENT: Primary | ICD-10-CM

## 2022-06-16 DIAGNOSIS — Z23 NEED FOR PROPHYLACTIC VACCINATION AND INOCULATION AGAINST VARICELLA: ICD-10-CM

## 2022-06-16 DIAGNOSIS — Z12.5 PROSTATE CANCER SCREENING: ICD-10-CM

## 2022-06-16 DIAGNOSIS — Z23 NEED FOR PROPHYLACTIC VACCINATION AGAINST DIPHTHERIA-TETANUS-PERTUSSIS (DTP): ICD-10-CM

## 2022-06-16 DIAGNOSIS — N18.31 STAGE 3A CHRONIC KIDNEY DISEASE (HCC): ICD-10-CM

## 2022-06-16 PROBLEM — N18.30 CHRONIC RENAL DISEASE, STAGE III (HCC): Status: ACTIVE | Noted: 2022-06-16

## 2022-06-16 PROCEDURE — 1123F ACP DISCUSS/DSCN MKR DOCD: CPT | Performed by: FAMILY MEDICINE

## 2022-06-16 PROCEDURE — 3017F COLORECTAL CA SCREEN DOC REV: CPT | Performed by: FAMILY MEDICINE

## 2022-06-16 PROCEDURE — G0439 PPPS, SUBSEQ VISIT: HCPCS | Performed by: FAMILY MEDICINE

## 2022-06-16 SDOH — ECONOMIC STABILITY: FOOD INSECURITY: WITHIN THE PAST 12 MONTHS, THE FOOD YOU BOUGHT JUST DIDN'T LAST AND YOU DIDN'T HAVE MONEY TO GET MORE.: NEVER TRUE

## 2022-06-16 SDOH — ECONOMIC STABILITY: FOOD INSECURITY: WITHIN THE PAST 12 MONTHS, YOU WORRIED THAT YOUR FOOD WOULD RUN OUT BEFORE YOU GOT MONEY TO BUY MORE.: NEVER TRUE

## 2022-06-16 ASSESSMENT — PATIENT HEALTH QUESTIONNAIRE - PHQ9
4. FEELING TIRED OR HAVING LITTLE ENERGY: 0
SUM OF ALL RESPONSES TO PHQ9 QUESTIONS 1 & 2: 0
1. LITTLE INTEREST OR PLEASURE IN DOING THINGS: 0
SUM OF ALL RESPONSES TO PHQ QUESTIONS 1-9: 0
6. FEELING BAD ABOUT YOURSELF - OR THAT YOU ARE A FAILURE OR HAVE LET YOURSELF OR YOUR FAMILY DOWN: 0
SUM OF ALL RESPONSES TO PHQ QUESTIONS 1-9: 0
8. MOVING OR SPEAKING SO SLOWLY THAT OTHER PEOPLE COULD HAVE NOTICED. OR THE OPPOSITE, BEING SO FIGETY OR RESTLESS THAT YOU HAVE BEEN MOVING AROUND A LOT MORE THAN USUAL: 0
2. FEELING DOWN, DEPRESSED OR HOPELESS: 0
10. IF YOU CHECKED OFF ANY PROBLEMS, HOW DIFFICULT HAVE THESE PROBLEMS MADE IT FOR YOU TO DO YOUR WORK, TAKE CARE OF THINGS AT HOME, OR GET ALONG WITH OTHER PEOPLE: 0
7. TROUBLE CONCENTRATING ON THINGS, SUCH AS READING THE NEWSPAPER OR WATCHING TELEVISION: 0
SUM OF ALL RESPONSES TO PHQ QUESTIONS 1-9: 0
3. TROUBLE FALLING OR STAYING ASLEEP: 0
SUM OF ALL RESPONSES TO PHQ QUESTIONS 1-9: 0
9. THOUGHTS THAT YOU WOULD BE BETTER OFF DEAD, OR OF HURTING YOURSELF: 0
5. POOR APPETITE OR OVEREATING: 0

## 2022-06-16 ASSESSMENT — LIFESTYLE VARIABLES
HOW MANY STANDARD DRINKS CONTAINING ALCOHOL DO YOU HAVE ON A TYPICAL DAY: 1 OR 2
HOW OFTEN DO YOU HAVE A DRINK CONTAINING ALCOHOL: MONTHLY OR LESS

## 2022-06-16 ASSESSMENT — SOCIAL DETERMINANTS OF HEALTH (SDOH): HOW HARD IS IT FOR YOU TO PAY FOR THE VERY BASICS LIKE FOOD, HOUSING, MEDICAL CARE, AND HEATING?: NOT HARD AT ALL

## 2022-06-16 NOTE — PROGRESS NOTES
Medicare Annual Wellness Visit    Luther Whyte is here for [facility] AW[facility]  Patient has upcoming follow-up appointment with oncology    Assessment & Plan   Medicare annual wellness visit, subsequent  Reviewed recent lab work   secondary malignant neoplasm of bone ([facility]  Stable: In the care of oncology  Thrombocytopenia ([facility] [address] [facility]  Stable: In the care of oncology  Need for prophylactic vaccination against diphtheria-tetanus-pertussis (DTP)  -     Tdap (ADACEL) 5-2-15.5 LF-MCG/0.5 injection; Inject 0.5 mLs into the muscle once for 1 dose, Disp-0.5 mL, R-0Print  Need for prophylactic vaccination and inoculation against varicella  -     zoster recombinant adjuvanted vaccine [facility]) 50 MCG/0.5ML SUSR injection; Inject 0.5 mLs into the muscle once for 1 dose, Disp-0.5 mL, R-0Print  Prostate cancer screening  -     PSA Screening; Future  Stage [facility]      Recommendations for Preventive Services Due: see orders and patient instructions/AVS.  Recommended screening schedule for the next 5-10 years is provided to the patient in written form: see Patient Instructions/AVS.     Return for Medicare Annual Wellness Visit in 1 year. Subjective     Patient's complete Health Risk Assessment and screening values have been reviewed and are found in Flowsheets. The following problems were reviewed today and where indicated follow up appointments were made and/or referrals ordered.     Positive Risk Factor Screenings with Interventions:    Fall Risk:  Do you feel unsteady or are you worried about falling? : (!) yes (pt reports not having cane but reports he only feels this way occasionally)  2 or more falls in past year?: no  Fall with injury in past year?: no     Fall Risk Interventions:    · Home safety tips provided        Drug Use Screening:    DAST-10 Score Interpretation:  1-2: Low level - Monitor, re-assess at a later date; 3-5: Moderate level - Further Investigation; 6-8: Substantial level - Intensive Assessment; 9-10: Severe level - Intensive Assessment    Substance Use - Drug Use Interventions:  No intervention needed         General Health and ACP:  General  In general, how would you say your health is?: Good  In the past 7 days, have you experienced any of the following: New or Increased Pain, New or Increased Fatigue, Loneliness, Social Isolation, Stress or Anger?: (!) Yes  Select all that apply: (!) Loneliness (pt reports he feels lonely at times due to lack of social communication)  Do you get the social and emotional support that you need?: Yes  Do you have a Living Will?: Yes    Advance Directives     Power of  Living Will ACP-Advance Directive ACP-Power of     Not on File Filed on 08/17/21 Filed Not on File      General Health Risk Interventions:  · Loneliness: patient declines any further intervention for this issue    Health Habits/Nutrition:     Physical Activity: Inactive    Days of Exercise per Week: 0 days    Minutes of Exercise per Session: 0 min     Have you lost any weight without trying in the past 3 months?: No  Body mass index: (!) 29.1  Have you seen the dentist within the past year?: Yes    Health Habits/Nutrition Interventions:  · No intervention needed at this time    Hearing/Vision:  Do you or your family notice any trouble with your hearing that hasn't been managed with hearing aids?: No  Do you have difficulty driving, watching TV, or doing any of your daily activities because of your eyesight?: (!) Yes (dry eyes which has been causing blurred vision x1 year)  Have you had an eye exam within the past year?: Yes (6 months ago, was given eye drops for dry eye)  No exam data present    Hearing/Vision Interventions:  · Vision concerns:  patient encouraged to make appointment with his/her eye specialist            Objective   Vitals:    06/16/22 1005   BP: 134/70   Site: Right Upper Arm   Position: Sitting   Cuff Size: Medium Adult   Pulse: 87   Resp: 16   Temp: (!) 95.9 °F (35.5 °C)   TempSrc: Temporal   SpO2: 98%   Weight: 202 lb 12.8 oz (92 kg)   Height: 5' 10\" (1.778 m)      Body mass index is 29.1 kg/m². Physical Exam  Vitals and nursing note reviewed. Constitutional:       General: He is not in acute distress. Appearance: Normal appearance. He is well-developed. He is obese. He is not diaphoretic. HENT:      Head: Normocephalic and atraumatic. Nose: Nose normal.      Mouth/Throat:      Mouth: Mucous membranes are moist.      Pharynx: Oropharynx is clear. Eyes:      Conjunctiva/sclera: Conjunctivae normal.      Pupils: Pupils are equal, round, and reactive to light. Cardiovascular:      Rate and Rhythm: Normal rate and regular rhythm. Heart sounds: Normal heart sounds. No murmur heard. No friction rub. No gallop. Pulmonary:      Effort: Pulmonary effort is normal. No respiratory distress. Breath sounds: Normal breath sounds. No wheezing or rales. Chest:      Chest wall: No tenderness. Abdominal:      General: Abdomen is flat. Bowel sounds are normal. There is no distension. Palpations: Abdomen is soft. There is no mass. Tenderness: There is no abdominal tenderness. There is no right CVA tenderness, left CVA tenderness, guarding or rebound. Hernia: No hernia is present. Musculoskeletal:      Cervical back: Normal range of motion and neck supple. Skin:     General: Skin is warm and dry. Neurological:      General: No focal deficit present. Mental Status: He is alert and oriented to person, place, and time. Mental status is at baseline. Psychiatric:         Behavior: Behavior normal.         Thought Content:  Thought content normal.         Judgment: Judgment normal.         Allergies   Allergen Reactions    Gluten Meal     Iodine      Other reaction(s): GI Upset    Pollen Extract     Lactose Nausea And Vomiting    Shellfish Allergy Nausea And Vomiting    Shellfish-Derived Products Rash     Prior to Visit Medications    Medication Sig Taking? Authorizing Provider   omeprazole (PRILOSEC) 20 MG delayed release capsule Take 1 capsule by mouth daily Yes SARA Santoyo CNP   clonazePAM (KLONOPIN) 0.5 MG tablet Take 0.5 mg by mouth 2 times daily as needed. Yes Historical Provider, MD   Ferrous Sulfate Dried (HIGH POTENCY IRON) 65 MG TABS Take by mouth Yes Historical Provider, MD   chlorhexidine (PERIDEX) 0.12 % solution rinse with 1 (ONE) tablespoon 2 (TWO) times a day Yes Historical Provider, MD   REVLIMID 10 MG chemo capsule Take 1 capsule by mouth daily for 28 days Adult Male/ DxC90.01/ Auth # [id number] Yes Dorian Nowak MD   potassium chloride (KLOR-CON M) 10 MEQ extended release tablet TK 1 T PO QID Yes Dorian Nowak MD   acetaminophen (TYLENOL) 325 MG tablet Take 650 mg by mouth Yes Historical Provider, MD   aspirin 81 MG EC tablet Take 81 mg by mouth Yes Historical Provider, MD   vitamin D-3 (CHOLECALCIFEROL) 5000 units TABS Take 5,000 Units by mouth Yes Historical Provider, MD   loperamide (IMODIUM) 2 MG capsule Take 2 mg by mouth Yes Historical Provider, MD   loratadine (CLARITIN) 10 MG tablet Take 10 mg by mouth Yes Historical Provider, MD   Multiple Vitamins-Minerals (MULTIVITAMIN ADULT PO) Take by mouth Yes Historical Provider, MD   calcium carbonate 600 MG TABS tablet Take 4 tablets by mouth daily Yes Historical Provider, MD Chu (Including outside providers/suppliers regularly involved in providing care):   Patient Care Team:  Kathryn Valencia MD as PCP - General (Family Medicine)  Kathryn Valencia MD as PCP - [facility] Empaneled Provider     Reviewed and updated this visit:  Tobacco  Allergies  Meds  Problems  Med Hx  Surg Hx  Soc Hx  Fam Hx                   Advance Care Planning   Advanced Care Planning: Discussed the patients choices for care and treatment in case of a health event that adversely affects decision-making abilities.  Also discussed the patients long-term treatment options. Reviewed with the patient the appropriate state-specific advance directive documents. Reviewed the process of designating a competent adult as an Agent (or -in-fact) that could take make health care decisions for the patient if incompetent. Patient was asked to complete the declaration forms, either acknowledge the forms by a public notary or an eligible witness and provide a signed copy to the practice office.   Time spent (minutes): 3

## 2022-06-16 NOTE — PATIENT INSTRUCTIONS
Advance Directives: Care Instructions  Overview  An advance directive is a legal way to state your wishes at the end of your life. It tells your family and your doctor what to do if you can't say what youwant. There are two main types of advance directives. You can change them any timeyour wishes change. Living will. This form tells your family and your doctor your wishes about life support and other treatment. The form is also called a declaration. Medical power of . This form lets you name a person to make treatment decisions for you when you can't speak for yourself. This person is called a health care agent (health care proxy, health care surrogate). The form is also called a durable power of  for health care. If you do not have an advance directive, decisions about your medical care maybe made by a family member, or by a doctor or a  who doesn't know you. It may help to think of an advance directive as a gift to the people who carefor you. If you have one, they won't have to make tough decisions by themselves. Follow-up care is a key part of your treatment and safety. Be sure to make and go to all appointments, and call your doctor if you are having problems. It's also a good idea to know your test results and keep alist of the medicines you take. What should you include in an advance directive? Many states have a unique advance directive form. (It may ask you to address specific issues.) Or you might use a universal form that's approved by manystates. If your form doesn't tell you what to address, it may be hard to know what to include in your advance directive. Use the questions below to help you getstarted.  Who do you want to make decisions about your medical care if you are not able to?  What life-support measures do you want if you have a serious illness that gets worse over time or can't be cured?  What are you most afraid of that might happen?  (Maybe you're afraid of having pain, losing your independence, or being kept alive by machines.)   Where would you prefer to die? (Your home? A hospital? A nursing home?)   Do you want to donate your organs when you die?  Do you want certain Christianity practices performed before you die? When should you call for help? Be sure to contact your doctor if you have any questions. Where can you learn more? Go to [email or web address] and sign in to your [employer] account. Enter R264 in the [employer] box to learn more about \"Advance Directives: Care Instructions. \"     If you do not have an account, please click on the \"Sign Up Now\" link. Current as of: October 18, 2021               Content Version: 13.2  © 7064-0381 Healthwise, [employer]. Care instructions adapted under license by [facility] [place] ([facility]). If you have questions about a medical condition or this instruction, always ask your healthcare professional. [place] [address] any warranty or liability for your use of this information. Personalized Preventive Plan for Rukhsana Moser - 6/16/2022  Medicare offers a range of preventive health benefits. Some of the tests and screenings are paid in full while other may be subject to a deductible, co-insurance, and/or copay. Some of these benefits include a comprehensive review of your medical history including lifestyle, illnesses that may run in your family, and various assessments and screenings as appropriate. After reviewing your medical record and screening and assessments performed today your provider may have ordered immunizations, labs, imaging, and/or referrals for you. A list of these orders (if applicable) as well as your Preventive Care list are included within your After Visit Summary for your review.     Other Preventive Recommendations:    · A preventive eye exam performed by an eye specialist is recommended every 1-2 years to screen for glaucoma; cataracts, macular degeneration, and other eye disorders. · A preventive dental visit is recommended every 6 months. · Try to get at least 150 minutes of exercise per week or 10,000 steps per day on a pedometer . · Order or download the FREE \"Exercise & Physical Activity: Your Everyday Guide\" from [employer]. Call 9-500.161.6825 or search [employer] online. · You need 1179-4885 mg of calcium and 0787-3182 IU of vitamin D per day. It is possible to meet your calcium requirement with diet alone, but a vitamin D supplement is usually necessary to meet this goal.  · When exposed to the sun, use a sunscreen that protects against both UVA and UVB radiation with an SPF of 30 or greater. Reapply every 2 to 3 hours or after sweating, drying off with a towel, or swimming. · Always wear a seat belt when traveling in a car. Always wear a helmet when riding a bicycle or motorcycle.

## 2022-09-27 ENCOUNTER — HOSPITAL ENCOUNTER (OUTPATIENT)
Dept: LAB | Age: 67
Discharge: HOME | End: 2022-09-27
Payer: MEDICARE

## 2022-09-27 LAB
BILIRUBIN URINE: NEGATIVE
BLOOD, URINE: NEGATIVE
CLARITY: CLEAR
COLOR: YELLOW
GLUCOSE URINE: NEGATIVE MG/DL
KETONES, URINE: NEGATIVE MG/DL
LEUKOCYTE ESTERASE, URINE: NEGATIVE
NITRITE, URINE: NEGATIVE
PH UA: 5.5 (ref 5–9)
PROTEIN UA: ABNORMAL MG/DL
SPECIFIC GRAVITY UA: 1.02 (ref 1–1.03)
UROBILINOGEN, URINE: 0.2 E.U./DL

## 2022-09-27 PROCEDURE — 81003 URINALYSIS AUTO W/O SCOPE: CPT

## 2022-09-27 PROCEDURE — 36415 COLL VENOUS BLD VENIPUNCTURE: CPT

## 2022-09-27 PROCEDURE — 80069 RENAL FUNCTION PANEL: CPT

## 2022-09-28 LAB
ALBUMIN SERPL-MCNC: 3.9 G/DL (ref 3.5–4.6)
ANION GAP SERPL CALCULATED.3IONS-SCNC: 14 MEQ/L (ref 9–15)
BUN BLDV-MCNC: 17 MG/DL (ref 8–23)
CALCIUM SERPL-MCNC: 9.1 MG/DL (ref 8.5–9.9)
CHLORIDE BLD-SCNC: 105 MEQ/L (ref 95–107)
CO2: 20 MEQ/L (ref 20–31)
CREAT SERPL-MCNC: 1.59 MG/DL (ref 0.7–1.2)
GFR AFRICAN AMERICAN: 52.6
GFR NON-AFRICAN AMERICAN: 43.5
GLUCOSE BLD-MCNC: 94 MG/DL (ref 70–99)
PHOSPHORUS: 2.6 MG/DL (ref 2.3–4.8)
POTASSIUM SERPL-SCNC: 4.1 MEQ/L (ref 3.4–4.9)
SODIUM BLD-SCNC: 139 MEQ/L (ref 135–144)

## 2023-01-14 ENCOUNTER — OFFICE VISIT (OUTPATIENT)
Dept: FAMILY MEDICINE CLINIC | Age: 68
End: 2023-01-14

## 2023-01-14 VITALS
DIASTOLIC BLOOD PRESSURE: 74 MMHG | BODY MASS INDEX: 29.58 KG/M2 | SYSTOLIC BLOOD PRESSURE: 124 MMHG | WEIGHT: 206.6 LBS | TEMPERATURE: 97.7 F | HEART RATE: 84 BPM | HEIGHT: 70 IN | OXYGEN SATURATION: 96 %

## 2023-01-14 DIAGNOSIS — J31.2 CHRONIC SORE THROAT: ICD-10-CM

## 2023-01-14 DIAGNOSIS — K11.20 PAROTITIS: ICD-10-CM

## 2023-01-14 DIAGNOSIS — H92.01 EAR PAIN, RIGHT: Primary | ICD-10-CM

## 2023-01-14 RX ORDER — DIPHENOXYLATE HYDROCHLORIDE AND ATROPINE SULFATE 2.5; .025 MG/1; MG/1
TABLET ORAL
COMMUNITY
Start: 2023-01-04

## 2023-01-14 RX ORDER — CLINDAMYCIN HYDROCHLORIDE 300 MG/1
300 CAPSULE ORAL 4 TIMES DAILY
Qty: 40 CAPSULE | Refills: 0 | Status: SHIPPED | OUTPATIENT
Start: 2023-01-14 | End: 2023-01-24

## 2023-01-14 ASSESSMENT — PATIENT HEALTH QUESTIONNAIRE - PHQ9
2. FEELING DOWN, DEPRESSED OR HOPELESS: 0
SUM OF ALL RESPONSES TO PHQ QUESTIONS 1-9: 0
9. THOUGHTS THAT YOU WOULD BE BETTER OFF DEAD, OR OF HURTING YOURSELF: 0
6. FEELING BAD ABOUT YOURSELF - OR THAT YOU ARE A FAILURE OR HAVE LET YOURSELF OR YOUR FAMILY DOWN: 0
SUM OF ALL RESPONSES TO PHQ QUESTIONS 1-9: 0
4. FEELING TIRED OR HAVING LITTLE ENERGY: 0
8. MOVING OR SPEAKING SO SLOWLY THAT OTHER PEOPLE COULD HAVE NOTICED. OR THE OPPOSITE, BEING SO FIGETY OR RESTLESS THAT YOU HAVE BEEN MOVING AROUND A LOT MORE THAN USUAL: 0
5. POOR APPETITE OR OVEREATING: 0
SUM OF ALL RESPONSES TO PHQ QUESTIONS 1-9: 0
7. TROUBLE CONCENTRATING ON THINGS, SUCH AS READING THE NEWSPAPER OR WATCHING TELEVISION: 0
SUM OF ALL RESPONSES TO PHQ9 QUESTIONS 1 & 2: 0
1. LITTLE INTEREST OR PLEASURE IN DOING THINGS: 0
SUM OF ALL RESPONSES TO PHQ QUESTIONS 1-9: 0
3. TROUBLE FALLING OR STAYING ASLEEP: 0
10. IF YOU CHECKED OFF ANY PROBLEMS, HOW DIFFICULT HAVE THESE PROBLEMS MADE IT FOR YOU TO DO YOUR WORK, TAKE CARE OF THINGS AT HOME, OR GET ALONG WITH OTHER PEOPLE: 0

## 2023-01-14 ASSESSMENT — ENCOUNTER SYMPTOMS
VOMITING: 0
BACK PAIN: 0
COUGH: 0
TROUBLE SWALLOWING: 0
WHEEZING: 0
ABDOMINAL PAIN: 0
VOICE CHANGE: 0
SHORTNESS OF BREATH: 0

## 2023-01-14 NOTE — PROGRESS NOTES
[facility] Encounter  CHIEF COMPLAINT     No chief complaint on file. HISTORY OF PRESENT ILLNESS   Iva Riggins is a 75 y.o. male who presents with:  Past few days has had some right-sided earache and some congestion. No fevers associated with this. Last evening after dinner he noticed some swelling to the right side of his face next to his jaw this improved last evening. This morning he seemed to wake up and it was fine and after eating breakfast it swelled up again. Presents here for evaluation. Pain is aching otherwise nothing making it better or worse no fever or other associated symptoms. REVIEW OF SYSTEMS     Review of Systems   Constitutional:  Negative for fever. HENT:  Positive for ear pain. Negative for trouble swallowing and voice change. Eyes:  Negative for visual disturbance. Respiratory:  Negative for cough, shortness of breath and wheezing. Cardiovascular:  Negative for chest pain. Gastrointestinal:  Negative for abdominal pain and vomiting. Genitourinary:  Negative for dysuria. Musculoskeletal:  Negative for back pain. Skin:  Negative for rash. Allergic/Immunologic: Negative for immunocompromised state. Neurological:  Negative for headaches. Psychiatric/Behavioral:  Negative for behavioral problems. PAST MEDICAL HISTORY         Diagnosis Date    Cancer [facility])     Multiple Myeloma    Elevated blood sugar     Obesity      SURGICAL HISTORY     Patient  has a past surgical history that includes Mouth surgery; Foot surgery; hernia repair; bone marrow biopsy; Pacemaker insertion (2020); pacemaker placement; Upper gastrointestinal endoscopy (N/A, 8/16/2021); and Colonoscopy (N/A, 8/16/2021).   CURRENT MEDICATIONS       Previous Medications    ACETAMINOPHEN (TYLENOL) 325 MG TABLET    Take 650 mg by mouth    ASPIRIN 81 MG EC TABLET    Take 81 mg by mouth    CALCIUM CARBONATE 600 MG TABS TABLET    Take 4 tablets by mouth daily CHLORHEXIDINE (PERIDEX) 0.12 % SOLUTION    rinse with 1 (ONE) tablespoon 2 (TWO) times a day    CLONAZEPAM (KLONOPIN) 0.5 MG TABLET    Take 0.5 mg by mouth 2 times daily as needed. FERROUS SULFATE DRIED (HIGH POTENCY IRON) 65 MG TABS    Take by mouth    LOPERAMIDE (IMODIUM) 2 MG CAPSULE    Take 2 mg by mouth    LORATADINE (CLARITIN) 10 MG TABLET    Take 10 mg by mouth    MULTIPLE VITAMINS-MINERALS (MULTIVITAMIN ADULT PO)    Take by mouth    OMEPRAZOLE (PRILOSEC) 20 MG DELAYED RELEASE CAPSULE    Take 1 capsule by mouth daily    POTASSIUM CHLORIDE (KLOR-CON M) 10 MEQ EXTENDED RELEASE TABLET    TK 1 T PO QID    REVLIMID 10 MG CHEMO CAPSULE    Take 1 capsule by mouth daily for 28 days Adult Male/ DxC90.01/ [facility] # [id number]    VITAMIN D-3 (CHOLECALCIFEROL) 5000 UNITS TABS    Take 5,000 Units by mouth     ALLERGIES     Patient is is allergic to gluten meal, iodine, pollen extract, lactose, shellfish allergy, and shellfish-derived products. FAMILY HISTORY     Patient'sfamily history includes Diabetes in his mother; Heart Disease in his father. HISTORY     Patient  reports that he quit smoking about 48 years ago. He has a 1.00 pack-year smoking history. He has never used smokeless tobacco. He reports that he does not currently use alcohol after a past usage of about 1.0 - 2.0 standard drink per week. He reports current drug use. Drug: Marijuana Gladis Pleas). PHYSICAL EXAM     VITALS   ,  ,  ,  ,    Physical Exam  Vitals and nursing note reviewed. Constitutional:       Appearance: Normal appearance. He is normal weight. HENT:      Head: Normocephalic and atraumatic. Right Ear: Tympanic membrane normal.      Left Ear: Tympanic membrane normal.      Nose: No congestion. Mouth/Throat:      Mouth: Mucous membranes are moist.      Comments: Missing teeth throughout, there is tenderness at the right inner posterior buccal mucosa no mass palpated. No tenderness to dental percussion.   Otherwise no asymmetry tongue is not raised airway patent no trismus. There is right sided facial swelling beginning at the angle of the mandible extending  anteriorly. Some local tenderness. Not extending below the jawline. Eyes:      Extraocular Movements: Extraocular movements intact. Pupils: Pupils are equal, round, and reactive to light. Neck:      Comments: The swelling as described above is not extending inferiorly  Cardiovascular:      Rate and Rhythm: Normal rate and regular rhythm. Heart sounds: No murmur heard. Pulmonary:      Effort: Pulmonary effort is normal.      Breath sounds: Normal breath sounds. Musculoskeletal:         General: Normal range of motion. Cervical back: Normal range of motion and neck supple. Skin:     General: Skin is warm and dry. Capillary Refill: Capillary refill takes less than 2 seconds. Neurological:      General: No focal deficit present. Mental Status: He is alert and oriented to person, place, and time. Cranial Nerves: No cranial nerve deficit. Psychiatric:         Mood and Affect: Mood normal.     READY CARE COURSE   Labs:  No results found for this visit on 01/14/23. IMAGING:  No orders to display     Scheduled Meds:  Continuous Infusions:  PRN Meds:. PROCEDURES:  FINAL IMPRESSION      1. Ear pain, right    2. Chronic sore throat    3. Parotitis      DISPOSITION/PLAN   MDM  70-year-old male well-appearing, past few days with right-sided earache some congestion slight sore throat. [facility] COVID flu and strep screen negative. What ultimately brought him in was because he had some right-sided facial swelling that started last night after eating, this improved last evening. Spontaneously returned again this morning after eating breakfast.  Symptoms consistent with parotitis possible salivary stone. None was palpated. Otherwise airway patent no airway compromise. Instructed on red flag symptoms which would prompt ER evaluation. Will be started on clindamycin. ENT follow-up given. Will also contact pcp for followup. PATIENT REFERRED TO:  Return in about 5 days (around 1/19/2023). DISCHARGE MEDICATIONS:  New Prescriptions    CLINDAMYCIN (CLEOCIN) 300 MG CAPSULE    Take 1 capsule by mouth 4 times daily for 10 days     Cannot display discharge medications since this is not an admission.        Jessica Quiñones PA-C

## 2023-01-27 ENCOUNTER — OFFICE VISIT (OUTPATIENT)
Dept: FAMILY MEDICINE CLINIC | Age: 68
End: 2023-01-27

## 2023-01-27 VITALS
DIASTOLIC BLOOD PRESSURE: 74 MMHG | WEIGHT: 207.2 LBS | TEMPERATURE: 97.6 F | BODY MASS INDEX: 29.73 KG/M2 | HEART RATE: 84 BPM | OXYGEN SATURATION: 97 % | SYSTOLIC BLOOD PRESSURE: 122 MMHG

## 2023-01-27 DIAGNOSIS — N18.31 STAGE 3A CHRONIC KIDNEY DISEASE (HCC): ICD-10-CM

## 2023-01-27 DIAGNOSIS — C79.51 SECONDARY MALIGNANT NEOPLASM OF BONE (HCC): ICD-10-CM

## 2023-01-27 DIAGNOSIS — R60.9 PAROTID SWELLING: Primary | ICD-10-CM

## 2023-01-27 DIAGNOSIS — L98.9 SKIN LESION OF SCALP: ICD-10-CM

## 2023-01-27 DIAGNOSIS — D69.6 THROMBOCYTOPENIA (HCC): ICD-10-CM

## 2023-01-27 RX ORDER — AMOXICILLIN AND CLAVULANATE POTASSIUM 875; 125 MG/1; MG/1
1 TABLET, FILM COATED ORAL 2 TIMES DAILY
Qty: 20 TABLET | Refills: 0 | Status: SHIPPED | OUTPATIENT
Start: 2023-01-27 | End: 2023-02-06

## 2023-01-27 ASSESSMENT — ENCOUNTER SYMPTOMS
NAUSEA: 0
SINUS PAIN: 0
SINUS PRESSURE: 0
ABDOMINAL PAIN: 0
CHEST TIGHTNESS: 0
DIARRHEA: 0
CONSTIPATION: 0
SORE THROAT: 0
APNEA: 0
SHORTNESS OF BREATH: 0
COUGH: 0
BLOOD IN STOOL: 0
VOMITING: 0
FACIAL SWELLING: 1

## 2023-01-27 NOTE — PROGRESS NOTES
Subjective:      Patient ID: Kulwant Sheriff is a 75 y.o. male who presents today for:     Chief Complaint   Patient presents with    Follow-up     Walk in clinic, right side of face into jaw line and right ear, swells after he eats, x2 weeks        HPI  Kulwant Sheriff  Is a very pleasant 69-year-old male presents today to follow-up after being seen in the walk-in clinic for right parotid swelling and discomfort. He was treated with clindamycin and states that swelling improved but have not completely resolved. He has been trying to increase his hydration and use lemon drops however symptoms have not resolved. He states that there is still is mild swelling and pain behind his ear. He denies any fever or chills. He states that symptoms worsen after he eats as he notices increased swelling in the area and then it improves after approximately 20 to 30 minutes. Patient denies any tooth pain and states that he had x-rays performed 3 months ago which were negative    Patient follows with hematology due to history of malignant neoplasm and thrombocytopenia.   Patient also has stage III chronic kidney disease which is stable  Past Medical History:   Diagnosis Date    Cancer ([address]     Multiple Myeloma    Elevated blood sugar     Obesity      Past Surgical History:   Procedure Laterality Date    BONE MARROW BIOPSY      COLONOSCOPY N/A 8/16/2021    COLONOSCOPY with polypectomies and biopsies DIAGNOSTIC performed by Christina Peña MD at [place] [id number]  2020    PACEMAKER PLACEMENT      UPPER GASTROINTESTINAL ENDOSCOPY N/A 8/16/2021    EGD with biopsies  DIAGNOSTIC ONLY performed by [facility] MD at [facility]     Family History   Problem Relation Age of Onset    Diabetes Mother     Heart Disease Father     Colon Cancer Neg Hx      Social History     Socioeconomic History    Marital status:      Spouse name: Not on file Number of children: Not on file    Years of education: Not on file    Highest education level: Not on file   Occupational History    Not on file   Tobacco Use    Smoking status: Former     Packs/day: 0.25     Years: 4.00     Pack years: 1.00     Types: Cigarettes     Quit date: 65     Years since quittin.1    Smokeless tobacco: Never   Substance and Sexual Activity    Alcohol use: Not Currently     Alcohol/week: 1.0 - 2.0 standard drink     Types: 1 - 2 Glasses of wine per week     Comment: Pt. is a social drinker on weekends    Drug use: Yes     Types: Marijuana (Weed)     Comment:  on the weekends    Sexual activity: Yes     Partners: Female   Other Topics Concern    Not on file   Social History Narrative    Not on file     Social Determinants of Health     Financial Resource Strain: Low Risk     Difficulty of Paying Living Expenses: Not hard at all   Food Insecurity: No Food Insecurity    Worried About Running Out of Food in the Last Year: Never true    Ran Out of Food in the Last Year: Never true   Transportation Needs: Not on file   Physical Activity: Inactive    Days of Exercise per Week: 0 days    Minutes of Exercise per Session: 0 min   Stress: Not on file   Social Connections: Not on file   Intimate Partner Violence: Not on file   Housing Stability: Not on file     Current Outpatient Medications on File Prior to Visit   Medication Sig Dispense Refill    diphenoxylate-atropine (LOMOTIL) 2.5-0.025 MG per tablet TAKE 1 TABLET BY MOUTH EVERY 6 HOURS AS NEEDED FOR DIARRHEA      omeprazole (PRILOSEC) 20 MG delayed release capsule Take 1 capsule by mouth daily 30 capsule 3    clonazePAM (KLONOPIN) 0.5 MG tablet Take 0.5 mg by mouth 2 times daily as needed.       Ferrous Sulfate Dried (HIGH POTENCY IRON) 65 MG TABS Take by mouth      chlorhexidine (PERIDEX) 0.12 % solution rinse with 1 (ONE) tablespoon 2 (TWO) times a day      potassium chloride (KLOR-CON M) 10 MEQ extended release tablet TK 1 T PO QID 60 tablet 5    acetaminophen (TYLENOL) 325 MG tablet Take 650 mg by mouth      aspirin 81 MG EC tablet Take 81 mg by mouth      vitamin D-3 (CHOLECALCIFEROL) 5000 units TABS Take 5,000 Units by mouth      loperamide (IMODIUM) 2 MG capsule Take 2 mg by mouth      loratadine (CLARITIN) 10 MG tablet Take 10 mg by mouth      Multiple Vitamins-Minerals (MULTIVITAMIN ADULT PO) Take by mouth      calcium carbonate 600 MG TABS tablet Take 4 tablets by mouth daily      REVLIMID 10 MG chemo capsule Take 1 capsule by mouth daily for 28 days Adult Male/ DxC90.01/ Auth # [id number] 28 capsule 0     No current facility-administered medications on file prior to visit. Allergies:  Gluten meal, Iodine, Pollen extract, Lactose, Shellfish allergy, and Shellfish-derived products    Review of Systems   Constitutional:  Negative for activity change, appetite change and fatigue. HENT:  Positive for facial swelling. Negative for congestion, dental problem, drooling, ear discharge, ear pain, hearing loss, mouth sores, nosebleeds, postnasal drip, sinus pressure, sinus pain, sneezing and sore throat. Respiratory:  Negative for apnea, cough, chest tightness and shortness of breath. Cardiovascular:  Negative for chest pain, palpitations and leg swelling. Gastrointestinal:  Negative for abdominal pain, blood in stool, constipation, diarrhea, nausea and vomiting. Musculoskeletal:  Negative for arthralgias. Neurological:  Negative for seizures and headaches. Psychiatric/Behavioral:  Negative for hallucinations and suicidal ideas. Objective:   /74   Pulse 84   Temp 97.6 °F (36.4 °C) (Infrared)   Wt 207 lb 3.2 oz (94 kg)   SpO2 97%   BMI 29.73 kg/m²     Physical Exam  Vitals and nursing note reviewed. Constitutional:       General: He is not in acute distress. Appearance: Normal appearance. He is well-developed. He is not diaphoretic. HENT:      Head: Normocephalic and atraumatic. Jaw: Tenderness present. Salivary Glands: Right salivary gland is tender. Right Ear: Hearing, tympanic membrane, ear canal and external ear normal.      Left Ear: Hearing, tympanic membrane, ear canal and external ear normal.      Nose: Nose normal.      Mouth/Throat:      Mouth: Mucous membranes are moist.      Pharynx: Oropharynx is clear. Eyes:      Conjunctiva/sclera: Conjunctivae normal.      Pupils: Pupils are equal, round, and reactive to light. Cardiovascular:      Rate and Rhythm: Normal rate and regular rhythm. Heart sounds: Normal heart sounds. No murmur heard. No friction rub. No gallop. Pulmonary:      Effort: Pulmonary effort is normal. No respiratory distress. Breath sounds: Normal breath sounds. No wheezing or rales. Chest:      Chest wall: No tenderness. Abdominal:      General: Abdomen is flat. Bowel sounds are normal.      Palpations: Abdomen is soft. Tenderness: There is no abdominal tenderness. Musculoskeletal:      Cervical back: Normal range of motion. Skin:     General: Skin is warm and dry. Neurological:      Mental Status: He is alert and oriented to person, place, and time. Psychiatric:         Behavior: Behavior normal.         Thought Content: Thought content normal.         Judgment: Judgment normal.       Assessment & Plan:     1. Parotid swelling  Concerned that there may be a salivary duct stone  Advised the patient continue warm compresses and increase hydration. If swelling does return we will have patient use Augmentin with a probiotic. Advised about risk of C. difficile. We will also obtain an ultrasound and have patient follow-up with ENT  - amoxicillin-clavulanate (AUGMENTIN) 875-125 MG per tablet; Take 1 tablet by mouth 2 times daily for 10 days  Dispense: 20 tablet; Refill: 0  - US PAROTID; Future  - External Referral to ENT    2.  Skin lesion of scalp  Have patient evaluated by dermatology as area appears to be an actinic keratosis  - [facility] - Hal [facility], [facility], [place]    3. Secondary malignant neoplasm of bone (HCC)  Stable: In the care of oncology    4. Thrombocytopenia ([address]  Stable: In the care of oncology    5. Stage 3a chronic kidney disease (HCC)  Stable: In the care of nephrology      Return if symptoms worsen or fail to improve.     Ted Nash MD

## 2023-01-30 ENCOUNTER — TELEPHONE (OUTPATIENT)
Dept: FAMILY MEDICINE CLINIC | Age: 68
End: 2023-01-30

## 2023-01-30 ENCOUNTER — HOSPITAL ENCOUNTER (OUTPATIENT)
Dept: LAB | Age: 68
Discharge: HOME | End: 2023-01-30
Payer: MEDICARE

## 2023-01-30 LAB
ALBUMIN SERPL-MCNC: 3.8 G/DL (ref 3.5–4.6)
ANION GAP SERPL CALCULATED.3IONS-SCNC: 10 MEQ/L (ref 9–15)
BASOPHILS ABSOLUTE: 0.1 K/UL (ref 0–0.2)
BASOPHILS RELATIVE PERCENT: 2.9 %
BUN BLDV-MCNC: 10 MG/DL (ref 8–23)
CALCIUM SERPL-MCNC: 8.4 MG/DL (ref 8.5–9.9)
CHLORIDE BLD-SCNC: 107 MEQ/L (ref 95–107)
CO2: 23 MEQ/L (ref 20–31)
CREAT SERPL-MCNC: 1.64 MG/DL (ref 0.7–1.2)
CREATININE URINE: 115.7 MG/DL
EOSINOPHILS ABSOLUTE: 0.2 K/UL (ref 0–0.7)
EOSINOPHILS RELATIVE PERCENT: 3.9 %
GFR SERPL CREATININE-BSD FRML MDRD: 45.1 ML/MIN/{1.73_M2}
GLUCOSE BLD-MCNC: 110 MG/DL (ref 70–99)
HCT VFR BLD CALC: 38 % (ref 42–52)
HEMOGLOBIN: 12.5 G/DL (ref 14–18)
LYMPHOCYTES ABSOLUTE: 1 K/UL (ref 1–4.8)
LYMPHOCYTES RELATIVE PERCENT: 22.7 %
MCH RBC QN AUTO: 34.6 PG (ref 27–31.3)
MCHC RBC AUTO-ENTMCNC: 33 % (ref 33–37)
MCV RBC AUTO: 105 FL (ref 79–92.2)
MICROALBUMIN UR-MCNC: 1.3 MG/DL
MICROALBUMIN/CREAT UR-RTO: 11.2 MG/G (ref 0–30)
MONOCYTES ABSOLUTE: 0.7 K/UL (ref 0.2–0.8)
MONOCYTES RELATIVE PERCENT: 15.3 %
NEUTROPHILS ABSOLUTE: 2.5 K/UL (ref 1.4–6.5)
NEUTROPHILS RELATIVE PERCENT: 55.2 %
PDW BLD-RTO: 16.2 % (ref 11.5–14.5)
PHOSPHORUS: 3.2 MG/DL (ref 2.3–4.8)
PLATELET # BLD: 91 K/UL (ref 130–400)
POTASSIUM SERPL-SCNC: 4.5 MEQ/L (ref 3.4–4.9)
RBC # BLD: 3.62 M/UL (ref 4.7–6.1)
SODIUM BLD-SCNC: 140 MEQ/L (ref 135–144)
WBC # BLD: 4.6 K/UL (ref 4.8–10.8)

## 2023-01-30 PROCEDURE — 36415 COLL VENOUS BLD VENIPUNCTURE: CPT

## 2023-01-30 PROCEDURE — 80069 RENAL FUNCTION PANEL: CPT

## 2023-01-30 PROCEDURE — 85025 COMPLETE CBC W/AUTO DIFF WBC: CPT

## 2023-01-30 PROCEDURE — 82570 ASSAY OF URINE CREATININE: CPT

## 2023-01-30 PROCEDURE — 82043 UR ALBUMIN QUANTITATIVE: CPT

## 2023-01-30 NOTE — TELEPHONE ENCOUNTER
Zelda Matias from [facility] called and said the referral has been closed as they do not do parotid - they can only do soft tissue or thyroid.   Please send a new referral

## 2023-02-06 ENCOUNTER — TELEPHONE (OUTPATIENT)
Dept: FAMILY MEDICINE CLINIC | Age: 68
End: 2023-02-06

## 2023-02-06 DIAGNOSIS — R60.9 PAROTID SWELLING: Primary | ICD-10-CM

## 2023-02-08 NOTE — TELEPHONE ENCOUNTER
I Can order a soft tissue if this will be an adequate image. Please check with radiology.   Please also see prior message about whether an outside imaging center can perform ultrasound of parotid

## 2023-02-21 ENCOUNTER — HOSPITAL ENCOUNTER (OUTPATIENT)
Dept: CT IMAGING | Age: 68
Discharge: HOME | End: 2023-02-23
Payer: MEDICARE

## 2023-02-21 DIAGNOSIS — R60.9 PAROTID SWELLING: ICD-10-CM

## 2023-02-21 PROCEDURE — 70490 CT SOFT TISSUE NECK W/O DYE: CPT

## 2023-06-12 DIAGNOSIS — R10.31 RLQ ABDOMINAL PAIN: Primary | ICD-10-CM

## 2023-06-12 DIAGNOSIS — N18.31 ACUTE RENAL FAILURE SUPERIMPOSED ON STAGE 3A CHRONIC KIDNEY DISEASE, UNSPECIFIED ACUTE RENAL FAILURE TYPE (HCC): ICD-10-CM

## 2023-06-12 DIAGNOSIS — N17.9 ACUTE RENAL FAILURE SUPERIMPOSED ON STAGE 3A CHRONIC KIDNEY DISEASE, UNSPECIFIED ACUTE RENAL FAILURE TYPE (HCC): ICD-10-CM

## 2023-06-14 ENCOUNTER — TELEPHONE (OUTPATIENT)
Dept: GASTROENTEROLOGY | Age: 68
End: 2023-06-14

## 2023-06-14 NOTE — TELEPHONE ENCOUNTER
RENITA Collins called and stated that Dr. Junie Izquierdo would like for patient to be seen some time this upcoming week due to concerns of his cancer returning, as well as Duodenitis. Please advise if pt can be seen sooner.

## 2023-06-16 ENCOUNTER — TELEPHONE (OUTPATIENT)
Dept: GASTROENTEROLOGY | Age: 68
End: 2023-06-16

## 2023-06-18 DIAGNOSIS — R10.31 RLQ ABDOMINAL PAIN: ICD-10-CM

## 2023-06-18 DIAGNOSIS — R63.4 RECENT UNEXPLAINED WEIGHT LOSS: ICD-10-CM

## 2023-06-19 ENCOUNTER — TELEPHONE (OUTPATIENT)
Dept: FAMILY MEDICINE CLINIC | Age: 68
End: 2023-06-19

## 2023-06-19 RX ORDER — ONDANSETRON 4 MG/1
TABLET, ORALLY DISINTEGRATING ORAL
Qty: 21 TABLET | Refills: 0 | Status: SHIPPED | OUTPATIENT
Start: 2023-06-19

## 2023-06-19 NOTE — TELEPHONE ENCOUNTER
Called patient to check on current status. He states that he is overall improving. He is eating a soft diet and tolerating it well. He is still on the increased dose of his PPI. He stopped his Revlimid for 1 week and then restarted it over the weekend and has noticed an improvement in his diarrhea. He has an upcoming appointment with nephrology today at 11:00. Please make sure that CT scan is forwarded to oncology, and the findings of metastatic disease, marked as urgent , to determine if they need to see patient sooner than scheduled.   Please update me as to their response

## 2023-06-19 NOTE — TELEPHONE ENCOUNTER
Comments:     Last Office Visit (last PCP visit):   6/12/2023    Next Visit Date:  Future Appointments   Date Time Provider Raquel Lawrence   6/19/2023 10:00 AM [address], MD [address]   7/11/2023 10:15 AM Hamida Jefferson MD [facility]       **If hasn't been seen in over a year OR hasn't followed up according to last diabetes/ADHD visit, make appointment for patient before sending refill to provider.     Rx requested:  Requested Prescriptions     Pending Prescriptions Disp Refills    ondansetron (ZOFRAN-ODT) 4 MG disintegrating tablet [Pharmacy Med Name: ondansetron 4 mg disintegrating tablet] 21 tablet 0     Sig: DISSOLVE 1 (ONE) TABLET by mouth 3 times daily as needed for Nausea or Vomiting

## 2023-06-20 NOTE — TELEPHONE ENCOUNTER
Spoke with Sriram from [facility]. She stated Dr. Cherylene Smothers signed off on faxed report last week and made no notes on whether the pt should be in sooner.  Advised by Sriram for pt to keep 7/7 appt with Dr. Kami Morris to PCP

## 2023-06-26 ENCOUNTER — TRANSCRIBE ORDERS (OUTPATIENT)
Dept: ADMINISTRATIVE | Age: 68
End: 2023-06-26

## 2023-06-26 ENCOUNTER — HOSPITAL ENCOUNTER (EMERGENCY)
Age: 68
Discharge: HOME | End: 2023-06-26
Attending: EMERGENCY MEDICINE
Payer: MEDICARE

## 2023-06-26 ENCOUNTER — HOSPITAL ENCOUNTER (OUTPATIENT)
Dept: LAB | Age: 68
Discharge: HOME | End: 2023-06-26
Payer: MEDICARE

## 2023-06-26 VITALS
BODY MASS INDEX: 24.77 KG/M2 | DIASTOLIC BLOOD PRESSURE: 84 MMHG | OXYGEN SATURATION: 98 % | HEIGHT: 70 IN | RESPIRATION RATE: 18 BRPM | SYSTOLIC BLOOD PRESSURE: 98 MMHG | TEMPERATURE: 99.1 F | WEIGHT: 173 LBS | HEART RATE: 79 BPM

## 2023-06-26 DIAGNOSIS — C90.00 MULTIPLE MYELOMA, REMISSION STATUS UNSPECIFIED (HCC): Primary | ICD-10-CM

## 2023-06-26 DIAGNOSIS — C79.51 METASTASIS TO BONE (HCC): ICD-10-CM

## 2023-06-26 DIAGNOSIS — M54.50 LOW BACK PAIN, UNSPECIFIED BACK PAIN LATERALITY, UNSPECIFIED CHRONICITY, UNSPECIFIED WHETHER SCIATICA PRESENT: ICD-10-CM

## 2023-06-26 DIAGNOSIS — M54.50 LOW BACK PAIN, UNSPECIFIED BACK PAIN LATERALITY, UNSPECIFIED CHRONICITY, UNSPECIFIED WHETHER SCIATICA PRESENT: Primary | ICD-10-CM

## 2023-06-26 DIAGNOSIS — C90.01 MULTIPLE MYELOMA IN REMISSION (HCC): ICD-10-CM

## 2023-06-26 DIAGNOSIS — E87.5 HYPERKALEMIA: Primary | ICD-10-CM

## 2023-06-26 LAB
ALBUMIN SERPL-MCNC: 4 G/DL (ref 3.5–4.6)
ANION GAP SERPL CALCULATED.3IONS-SCNC: 11 MEQ/L (ref 9–15)
ANION GAP SERPL CALCULATED.3IONS-SCNC: 12 MEQ/L (ref 9–15)
BASOPHILS # BLD: 0.1 K/UL (ref 0–0.1)
BASOPHILS NFR BLD: 1.9 % (ref 0.2–1.2)
BUN SERPL-MCNC: 20 MG/DL (ref 8–23)
BUN SERPL-MCNC: 22 MG/DL (ref 8–23)
CALCIUM SERPL-MCNC: 9.2 MG/DL (ref 8.5–9.9)
CALCIUM SERPL-MCNC: 9.5 MG/DL (ref 8.5–9.9)
CHLORIDE SERPL-SCNC: 108 MEQ/L (ref 95–107)
CHLORIDE SERPL-SCNC: 109 MEQ/L (ref 95–107)
CO2 SERPL-SCNC: 18 MEQ/L (ref 20–31)
CO2 SERPL-SCNC: 19 MEQ/L (ref 20–31)
CREAT SERPL-MCNC: 1.99 MG/DL (ref 0.7–1.2)
CREAT SERPL-MCNC: 2 MG/DL (ref 0.7–1.2)
EOSINOPHIL # BLD: 0.2 K/UL (ref 0–0.5)
EOSINOPHIL NFR BLD: 3.8 % (ref 0.8–7)
ERYTHROCYTE [DISTWIDTH] IN BLOOD BY AUTOMATED COUNT: 15.9 % (ref 11.6–14.4)
GLUCOSE SERPL-MCNC: 102 MG/DL (ref 70–99)
HCT VFR BLD AUTO: 34.4 % (ref 42–52)
HGB BLD-MCNC: 11.5 G/DL (ref 13.7–17.5)
IMM GRANULOCYTES # BLD: 0 K/UL
IMM GRANULOCYTES NFR BLD: 0.4 %
LYMPHOCYTES # BLD: 0.9 K/UL (ref 1.3–3.6)
LYMPHOCYTES NFR BLD: 17.5 %
MAGNESIUM SERPL-MCNC: 2 MG/DL (ref 1.7–2.4)
MCH RBC QN AUTO: 34 PG (ref 25.7–32.2)
MCHC RBC AUTO-ENTMCNC: 33.4 % (ref 32.3–36.5)
MCV RBC AUTO: 101.8 FL (ref 79–92.2)
MONOCYTES # BLD: 0.4 K/UL (ref 0.3–0.8)
MONOCYTES NFR BLD: 7.7 % (ref 5.3–12.2)
NEUTROPHILS # BLD: 3.7 K/UL (ref 1.8–5.4)
NEUTS SEG NFR BLD: 68.7 % (ref 34–67.9)
PHOSPHATE SERPL-MCNC: 3.1 MG/DL (ref 2.3–4.8)
PLATELET # BLD AUTO: 88 K/UL (ref 163–337)
PLATELET BLD QL SMEAR: ABNORMAL
POTASSIUM SERPL-SCNC: 5.2 MEQ/L (ref 3.4–4.9)
POTASSIUM SERPL-SCNC: 6.5 MEQ/L (ref 3.4–4.9)
RBC # BLD AUTO: 3.38 M/UL (ref 4.63–6.08)
SLIDE REVIEW: ABNORMAL
SODIUM SERPL-SCNC: 138 MEQ/L (ref 135–144)
SODIUM SERPL-SCNC: 139 MEQ/L (ref 135–144)
WBC # BLD AUTO: 5.3 K/UL (ref 4.2–9)

## 2023-06-26 PROCEDURE — 36415 COLL VENOUS BLD VENIPUNCTURE: CPT

## 2023-06-26 PROCEDURE — 2580000003 HC RX 258: Performed by: EMERGENCY MEDICINE

## 2023-06-26 PROCEDURE — 83735 ASSAY OF MAGNESIUM: CPT

## 2023-06-26 PROCEDURE — 93005 ELECTROCARDIOGRAM TRACING: CPT

## 2023-06-26 PROCEDURE — 85025 COMPLETE CBC W/AUTO DIFF WBC: CPT

## 2023-06-26 PROCEDURE — 99284 EMERGENCY DEPT VISIT MOD MDM: CPT

## 2023-06-26 PROCEDURE — 80069 RENAL FUNCTION PANEL: CPT

## 2023-06-26 PROCEDURE — 6370000000 HC RX 637 (ALT 250 FOR IP): Performed by: EMERGENCY MEDICINE

## 2023-06-26 RX ORDER — SODIUM POLYSTYRENE SULFONATE 15 G/60ML
45 SUSPENSION ORAL; RECTAL ONCE
Status: COMPLETED | OUTPATIENT
Start: 2023-06-26 | End: 2023-06-26

## 2023-06-26 RX ORDER — 0.9 % SODIUM CHLORIDE 0.9 %
1000 INTRAVENOUS SOLUTION INTRAVENOUS ONCE
Status: COMPLETED | OUTPATIENT
Start: 2023-06-26 | End: 2023-06-26

## 2023-06-26 RX ADMIN — SODIUM CHLORIDE 1000 ML: 9 INJECTION, SOLUTION INTRAVENOUS at 21:16

## 2023-06-26 RX ADMIN — SODIUM POLYSTYRENE SULFONATE 45 G: 15 SUSPENSION ORAL; RECTAL at 21:51

## 2023-06-26 ASSESSMENT — ENCOUNTER SYMPTOMS
ABDOMINAL DISTENTION: 0
SORE THROAT: 0
COUGH: 0
EYE PAIN: 0
DIARRHEA: 1
PHOTOPHOBIA: 0
SHORTNESS OF BREATH: 0
VOMITING: 0
APNEA: 0
COLOR CHANGE: 0
SINUS PRESSURE: 0
RHINORRHEA: 0
NAUSEA: 0
CONSTIPATION: 0
WHEEZING: 0
BACK PAIN: 0
ABDOMINAL PAIN: 0

## 2023-06-26 ASSESSMENT — LIFESTYLE VARIABLES
HOW MANY STANDARD DRINKS CONTAINING ALCOHOL DO YOU HAVE ON A TYPICAL DAY: 1 OR 2
HOW OFTEN DO YOU HAVE A DRINK CONTAINING ALCOHOL: NEVER

## 2023-06-26 ASSESSMENT — PAIN - FUNCTIONAL ASSESSMENT: PAIN_FUNCTIONAL_ASSESSMENT: NONE - DENIES PAIN

## 2023-06-27 LAB
EKG ATRIAL RATE: 71 BPM
EKG P AXIS: 51 DEGREES
EKG P-R INTERVAL: 100 MS
EKG Q-T INTERVAL: 400 MS
EKG QRS DURATION: 86 MS
EKG QTC CALCULATION (BAZETT): 434 MS
EKG R AXIS: -6 DEGREES
EKG T AXIS: -3 DEGREES
EKG VENTRICULAR RATE: 71 BPM

## 2023-06-27 PROCEDURE — 93010 ELECTROCARDIOGRAM REPORT: CPT | Performed by: INTERNAL MEDICINE

## 2023-06-30 DIAGNOSIS — R10.31 RLQ ABDOMINAL PAIN: ICD-10-CM

## 2023-06-30 DIAGNOSIS — R63.4 RECENT UNEXPLAINED WEIGHT LOSS: ICD-10-CM

## 2023-06-30 RX ORDER — ONDANSETRON 4 MG/1
TABLET, ORALLY DISINTEGRATING ORAL
Qty: 21 TABLET | Refills: 0 | Status: SHIPPED | OUTPATIENT
Start: 2023-06-30

## 2023-07-05 ENCOUNTER — HOSPITAL ENCOUNTER (OUTPATIENT)
Dept: MRI IMAGING | Age: 68
Discharge: HOME | End: 2023-07-07
Attending: INTERNAL MEDICINE

## 2023-07-05 DIAGNOSIS — C90.00 MULTIPLE MYELOMA, REMISSION STATUS UNSPECIFIED (HCC): ICD-10-CM

## 2023-07-05 DIAGNOSIS — M54.50 LOW BACK PAIN, UNSPECIFIED BACK PAIN LATERALITY, UNSPECIFIED CHRONICITY, UNSPECIFIED WHETHER SCIATICA PRESENT: ICD-10-CM

## 2023-07-05 DIAGNOSIS — C79.51 METASTASIS TO BONE (HCC): ICD-10-CM

## 2023-07-11 ENCOUNTER — PREP FOR PROCEDURE (OUTPATIENT)
Dept: GASTROENTEROLOGY | Age: 68
End: 2023-07-11

## 2023-07-11 ENCOUNTER — OFFICE VISIT (OUTPATIENT)
Dept: GASTROENTEROLOGY | Age: 68
End: 2023-07-11
Payer: MEDICARE

## 2023-07-11 VITALS — HEART RATE: 63 BPM | OXYGEN SATURATION: 99 % | BODY MASS INDEX: 23.62 KG/M2 | HEIGHT: 70 IN | WEIGHT: 165 LBS

## 2023-07-11 DIAGNOSIS — K21.9 GASTROESOPHAGEAL REFLUX DISEASE WITHOUT ESOPHAGITIS: ICD-10-CM

## 2023-07-11 DIAGNOSIS — R93.5 ABNORMAL CT OF THE ABDOMEN: ICD-10-CM

## 2023-07-11 DIAGNOSIS — R19.7 DIARRHEA, UNSPECIFIED TYPE: Primary | ICD-10-CM

## 2023-07-11 PROCEDURE — 1123F ACP DISCUSS/DSCN MKR DOCD: CPT | Performed by: INTERNAL MEDICINE

## 2023-07-11 PROCEDURE — 99214 OFFICE O/P EST MOD 30 MIN: CPT | Performed by: INTERNAL MEDICINE

## 2023-07-11 PROCEDURE — G8427 DOCREV CUR MEDS BY ELIG CLIN: HCPCS | Performed by: INTERNAL MEDICINE

## 2023-07-11 PROCEDURE — G8420 CALC BMI NORM PARAMETERS: HCPCS | Performed by: INTERNAL MEDICINE

## 2023-07-11 PROCEDURE — 1036F TOBACCO NON-USER: CPT | Performed by: INTERNAL MEDICINE

## 2023-07-11 PROCEDURE — 3017F COLORECTAL CA SCREEN DOC REV: CPT | Performed by: INTERNAL MEDICINE

## 2023-07-11 RX ORDER — DIPHENOXYLATE HYDROCHLORIDE AND ATROPINE SULFATE 2.5; .025 MG/1; MG/1
1 TABLET ORAL 2 TIMES DAILY
Qty: 60 TABLET | Refills: 3 | Status: SHIPPED | OUTPATIENT
Start: 2023-07-11 | End: 2023-08-10

## 2023-07-11 NOTE — PROGRESS NOTES
bilateral inguinal hernias. Peritoneum/Retroperitoneum: No lymphadenopathy. Few scattered arterial calcifications. Bones/Soft Tissues: Advanced compression deformity of T11. Moderate depression of the inferior endplate of L1. Advanced lace-like appearance throughout the vertebral body and pelvic girdle. No cortical destruction. No cortical thickening. 1. Suggestion of duodenitis involving the distal 2nd portion of the duodenum. 2. Advanced lace-like appearance throughout the majority of the lumbar spine and pelvic girdle, in a patient with known multiple myeloma. Advanced compression deformities of T11 and L1 noted      Assessment and Plan:  Kathryn Hart 70 y.o. male with longstanding history of diarrhea, gastroesophageal reflux, hiatal hernia, nonobstructing Schatzki's ring, history of anemia history colon polyps. Symptoms of diarrhea started after he began his chemotherapy for multiple myeloma in 2016. Patient with no significant response to Imodium. Has tried Lomotil in the past with good response. Colonoscopy in 2021 with biopsies negative for microscopic colitis. Stool studies in 2021 consistent with osmotic diarrhea. Additionally patient with history of diet (sugar) and used diarrhea. He has consistently decreased the amount of soda he drinks on a daily basis. No concern of significant weight loss, abnormal CT scan showing concern for peptic ulcer/duodenitis in the duodenum. 1. Diarrhea, unspecified type  -Diet advice rediscussed  - Calprotectin Stool; Future  - diphenoxylate-atropine (LOMOTIL) 2.5-0.025 MG per tablet; Take 1 tablet by mouth in the morning and at bedtime for 30 days. Max Daily Amount: 2 tablets  Dispense: 60 tablet; Refill: 3  -Discontinue Imodium    2. Gastroesophageal reflux disease without esophagitis  -Symptoms well controlled on omeprazole, no dysphagia    3.  Abnormal CT of the abdomen  -EGD to evaluate further    Follow-up to be scheduled based on endoscopic

## 2023-07-13 ENCOUNTER — TELEPHONE (OUTPATIENT)
Dept: FAMILY MEDICINE CLINIC | Age: 68
End: 2023-07-13

## 2023-07-13 ENCOUNTER — HOSPITAL ENCOUNTER (OUTPATIENT)
Age: 68
Setting detail: SPECIMEN
Discharge: HOME | End: 2023-07-13
Payer: MEDICARE

## 2023-07-13 DIAGNOSIS — R19.7 DIARRHEA, UNSPECIFIED TYPE: ICD-10-CM

## 2023-07-13 DIAGNOSIS — F32.A DEPRESSION, UNSPECIFIED DEPRESSION TYPE: Primary | ICD-10-CM

## 2023-07-13 PROCEDURE — 83993 ASSAY FOR CALPROTECTIN FECAL: CPT

## 2023-07-16 LAB — CALPROTECTIN STL-MCNT: 68 UG/G

## 2023-07-18 ENCOUNTER — OFFICE VISIT (OUTPATIENT)
Dept: BEHAVIORAL/MENTAL HEALTH CLINIC | Age: 68
End: 2023-07-18

## 2023-07-18 DIAGNOSIS — F32.1 MAJOR DEPRESSIVE DISORDER, SINGLE EPISODE, MODERATE (HCC): Primary | ICD-10-CM

## 2023-07-18 ASSESSMENT — PATIENT HEALTH QUESTIONNAIRE - PHQ9
8. MOVING OR SPEAKING SO SLOWLY THAT OTHER PEOPLE COULD HAVE NOTICED. OR THE OPPOSITE, BEING SO FIGETY OR RESTLESS THAT YOU HAVE BEEN MOVING AROUND A LOT MORE THAN USUAL: 3
2. FEELING DOWN, DEPRESSED OR HOPELESS: 1
SUM OF ALL RESPONSES TO PHQ9 QUESTIONS 1 & 2: 3
SUM OF ALL RESPONSES TO PHQ QUESTIONS 1-9: 14
SUM OF ALL RESPONSES TO PHQ QUESTIONS 1-9: 15
1. LITTLE INTEREST OR PLEASURE IN DOING THINGS: 2
3. TROUBLE FALLING OR STAYING ASLEEP: 0
SUM OF ALL RESPONSES TO PHQ QUESTIONS 1-9: 15
SUM OF ALL RESPONSES TO PHQ QUESTIONS 1-9: 15
10. IF YOU CHECKED OFF ANY PROBLEMS, HOW DIFFICULT HAVE THESE PROBLEMS MADE IT FOR YOU TO DO YOUR WORK, TAKE CARE OF THINGS AT HOME, OR GET ALONG WITH OTHER PEOPLE: 2
7. TROUBLE CONCENTRATING ON THINGS, SUCH AS READING THE NEWSPAPER OR WATCHING TELEVISION: 0
6. FEELING BAD ABOUT YOURSELF - OR THAT YOU ARE A FAILURE OR HAVE LET YOURSELF OR YOUR FAMILY DOWN: 3
9. THOUGHTS THAT YOU WOULD BE BETTER OFF DEAD, OR OF HURTING YOURSELF: 1
5. POOR APPETITE OR OVEREATING: 3
4. FEELING TIRED OR HAVING LITTLE ENERGY: 2

## 2023-07-21 NOTE — PROGRESS NOTES
Please arrange follow up if patient would like meication
clonazePAM (KLONOPIN) 0.5 MG tablet Take 1 tablet by mouth 2 times daily as needed. Ferrous Sulfate Dried (HIGH POTENCY IRON) 65 MG TABS Take by mouth      chlorhexidine (PERIDEX) 0.12 % solution rinse with 1 (ONE) tablespoon 2 (TWO) times a day      acetaminophen (TYLENOL) 325 MG tablet Take 2 tablets by mouth      aspirin 81 MG EC tablet Take 1 tablet by mouth      vitamin D-3 (CHOLECALCIFEROL) 5000 units TABS Take 1 tablet by mouth      loratadine (CLARITIN) 10 MG tablet Take 1 tablet by mouth      Multiple Vitamins-Minerals (MULTIVITAMIN ADULT PO) Take by mouth      calcium carbonate 600 MG TABS tablet Take 4 tablets by mouth daily       No current facility-administered medications for this visit. Social History:   Social History     Socioeconomic History    Marital status:      Spouse name: Not on file    Number of children: Not on file    Years of education: Not on file    Highest education level: Not on file   Occupational History    Not on file   Tobacco Use    Smoking status: Former     Packs/day: 0.25     Years: 4.00     Pack years: 1.00     Types: Cigarettes     Quit date: 65     Years since quittin.5    Smokeless tobacco: Never   Substance and Sexual Activity    Alcohol use: Not Currently     Alcohol/week: 1.0 - 2.0 standard drink     Types: 1 - 2 Glasses of wine per week     Comment: Pt. is a social drinker on weekends    Drug use: Yes     Types: Marijuana (Weed)     Comment:  on the weekends    Sexual activity: Yes     Partners: Female   Other Topics Concern    Not on file   Social History Narrative    Not on file     Social Determinants of Health     Financial Resource Strain: Low Risk     Difficulty of Paying Living Expenses: Not hard at all   Food Insecurity: No Food Insecurity    Worried About Running Out of Food in the Last Year: Never true    [address] in the Last Year: Never true   Transportation Needs: Unknown    Lack of Transportation (Medical):  Not on file

## 2023-07-26 ENCOUNTER — HOSPITAL ENCOUNTER (OUTPATIENT)
Dept: MRI IMAGING | Age: 68
Discharge: HOME | End: 2023-07-28
Attending: INTERNAL MEDICINE

## 2023-07-26 DIAGNOSIS — C79.51 METASTASIS TO BONE (HCC): ICD-10-CM

## 2023-07-26 DIAGNOSIS — C90.01 MULTIPLE MYELOMA IN REMISSION (HCC): ICD-10-CM

## 2023-07-26 DIAGNOSIS — M54.50 LOW BACK PAIN, UNSPECIFIED BACK PAIN LATERALITY, UNSPECIFIED CHRONICITY, UNSPECIFIED WHETHER SCIATICA PRESENT: ICD-10-CM

## 2023-07-31 ENCOUNTER — HOSPITAL ENCOUNTER (OUTPATIENT)
Dept: LAB | Age: 68
Discharge: HOME | End: 2023-07-31
Payer: MEDICARE

## 2023-07-31 LAB
ALBUMIN SERPL-MCNC: 3.8 G/DL (ref 3.5–4.6)
ANION GAP SERPL CALCULATED.3IONS-SCNC: 12 MEQ/L (ref 9–15)
BUN SERPL-MCNC: 18 MG/DL (ref 8–23)
CALCIUM SERPL-MCNC: 8.7 MG/DL (ref 8.5–9.9)
CHLORIDE SERPL-SCNC: 112 MEQ/L (ref 95–107)
CO2 SERPL-SCNC: 19 MEQ/L (ref 20–31)
CREAT SERPL-MCNC: 1.65 MG/DL (ref 0.7–1.2)
CREAT UR-MCNC: 153 MG/DL
ERYTHROCYTE [DISTWIDTH] IN BLOOD BY AUTOMATED COUNT: 16.6 % (ref 11.5–14.5)
GLUCOSE SERPL-MCNC: 82 MG/DL (ref 70–99)
HCT VFR BLD AUTO: 32.7 % (ref 42–52)
HGB BLD-MCNC: 11 G/DL (ref 14–18)
MCH RBC QN AUTO: 34.4 PG (ref 27–31.3)
MCHC RBC AUTO-ENTMCNC: 33.5 % (ref 33–37)
MCV RBC AUTO: 102.6 FL (ref 79–92.2)
PHOSPHATE SERPL-MCNC: 2.6 MG/DL (ref 2.3–4.8)
PLATELET # BLD AUTO: 95 K/UL (ref 130–400)
POTASSIUM SERPL-SCNC: 4.3 MEQ/L (ref 3.4–4.9)
PROT UR-MCNC: 19 MG/DL
PROT/CREAT UR-RTO: 0.1 ML/ML
PROT/CREAT UR-RTO: 0.1 ML/ML (ref 0–0.2)
RBC # BLD AUTO: 3.19 M/UL (ref 4.7–6.1)
SODIUM SERPL-SCNC: 143 MEQ/L (ref 135–144)
WBC # BLD AUTO: 7 K/UL (ref 4.8–10.8)

## 2023-07-31 PROCEDURE — 85027 COMPLETE CBC AUTOMATED: CPT

## 2023-07-31 PROCEDURE — 80069 RENAL FUNCTION PANEL: CPT

## 2023-07-31 PROCEDURE — 36415 COLL VENOUS BLD VENIPUNCTURE: CPT

## 2023-07-31 PROCEDURE — 84156 ASSAY OF PROTEIN URINE: CPT

## 2023-08-02 ENCOUNTER — OFFICE VISIT (OUTPATIENT)
Dept: BEHAVIORAL/MENTAL HEALTH CLINIC | Age: 68
End: 2023-08-02
Payer: MEDICARE

## 2023-08-02 DIAGNOSIS — F32.1 MAJOR DEPRESSIVE DISORDER, SINGLE EPISODE, MODERATE (HCC): Primary | ICD-10-CM

## 2023-08-02 PROCEDURE — 1036F TOBACCO NON-USER: CPT | Performed by: PSYCHOLOGIST

## 2023-08-02 PROCEDURE — 90832 PSYTX W PT 30 MINUTES: CPT | Performed by: PSYCHOLOGIST

## 2023-08-02 PROCEDURE — 1123F ACP DISCUSS/DSCN MKR DOCD: CPT | Performed by: PSYCHOLOGIST

## 2023-08-02 ASSESSMENT — ANXIETY QUESTIONNAIRES
5. BEING SO RESTLESS THAT IT IS HARD TO SIT STILL: 1
1. FEELING NERVOUS, ANXIOUS, OR ON EDGE: 2
GAD7 TOTAL SCORE: 10
IF YOU CHECKED OFF ANY PROBLEMS ON THIS QUESTIONNAIRE, HOW DIFFICULT HAVE THESE PROBLEMS MADE IT FOR YOU TO DO YOUR WORK, TAKE CARE OF THINGS AT HOME, OR GET ALONG WITH OTHER PEOPLE: SOMEWHAT DIFFICULT
7. FEELING AFRAID AS IF SOMETHING AWFUL MIGHT HAPPEN: 2
6. BECOMING EASILY ANNOYED OR IRRITABLE: 2
2. NOT BEING ABLE TO STOP OR CONTROL WORRYING: 1
4. TROUBLE RELAXING: 0
3. WORRYING TOO MUCH ABOUT DIFFERENT THINGS: 2

## 2023-08-02 ASSESSMENT — PATIENT HEALTH QUESTIONNAIRE - PHQ9
2. FEELING DOWN, DEPRESSED OR HOPELESS: 1
SUM OF ALL RESPONSES TO PHQ QUESTIONS 1-9: 5
3. TROUBLE FALLING OR STAYING ASLEEP: 0
SUM OF ALL RESPONSES TO PHQ QUESTIONS 1-9: 5
1. LITTLE INTEREST OR PLEASURE IN DOING THINGS: 1
7. TROUBLE CONCENTRATING ON THINGS, SUCH AS READING THE NEWSPAPER OR WATCHING TELEVISION: 0
6. FEELING BAD ABOUT YOURSELF - OR THAT YOU ARE A FAILURE OR HAVE LET YOURSELF OR YOUR FAMILY DOWN: 0
5. POOR APPETITE OR OVEREATING: 0
9. THOUGHTS THAT YOU WOULD BE BETTER OFF DEAD, OR OF HURTING YOURSELF: 0
SUM OF ALL RESPONSES TO PHQ9 QUESTIONS 1 & 2: 2
SUM OF ALL RESPONSES TO PHQ QUESTIONS 1-9: 5
SUM OF ALL RESPONSES TO PHQ QUESTIONS 1-9: 5
8. MOVING OR SPEAKING SO SLOWLY THAT OTHER PEOPLE COULD HAVE NOTICED. OR THE OPPOSITE, BEING SO FIGETY OR RESTLESS THAT YOU HAVE BEEN MOVING AROUND A LOT MORE THAN USUAL: 1
4. FEELING TIRED OR HAVING LITTLE ENERGY: 2

## 2023-08-02 NOTE — PROGRESS NOTES
Behavioral Health Consultation  Patito Siu, Ph.D., [facility]  Psychologist  8/2/23  12:58 PM EDT      Time spent with Patient: 30 minutes  This is patient's second  [facility] appointment. Reason for Consult:  depression and panic symptoms, chronic health concerns  Referring Provider: Anu Sandra MD      Feedback given to PCP. S:    Pt notes \"I'm doing okay, getting around better\" since using his cane. Pt provided an update on functioning, notes no major changes to his stress. Notes benefit of the cane in reducing his panic symptoms. Explored factors influencing perspective. Pt denies SI/HI    O:  MSE:    Appearance    alert, cooperative  Appetite normal  Sleep disturbance No  Fatigue Yes  Loss of pleasure No  Impulsive behavior No  Speech    spontaneous, normal rate, and normal volume  Mood    Appropriate  Affect    normal affect  Thought Content    intact  Thought Process    coherent  Associations    logical connections  Insight    Fair  Judgment    Intact  Orientation    oriented to person, place, time, and general circumstances  Memory    recent and remote memory intact  Attention/Concentration    intact  Morbid ideation No  Suicide Assessment    no suicidal ideation      History:    Medications:   Current Outpatient Medications   Medication Sig Dispense Refill    diphenoxylate-atropine (LOMOTIL) 2.5-0.025 MG per tablet Take 1 tablet by mouth in the morning and at bedtime for 30 days. Max Daily Amount: 2 tablets 60 tablet 3    ondansetron (ZOFRAN-ODT) 4 MG disintegrating tablet DISSOLVE 1 (ONE) TABLET by mouth 3 times daily as needed for Nausea or Vomiting 21 tablet 0    omeprazole (PRILOSEC) 20 MG delayed release capsule Take 2 capsules by mouth daily 30 capsule 3    clonazePAM (KLONOPIN) 0.5 MG tablet Take 1 tablet by mouth 2 times daily as needed.       Ferrous Sulfate Dried (HIGH POTENCY IRON) 65 MG TABS Take by mouth      chlorhexidine (PERIDEX) 0.12 % solution rinse with 1 (ONE) tablespoon 2 (TWO)

## 2023-08-09 ENCOUNTER — OFFICE VISIT (OUTPATIENT)
Dept: FAMILY MEDICINE CLINIC | Age: 68
End: 2023-08-09

## 2023-08-09 ENCOUNTER — HOSPITAL ENCOUNTER (OUTPATIENT)
Dept: ULTRASOUND IMAGING | Age: 68
Discharge: HOME | End: 2023-08-11
Payer: MEDICARE

## 2023-08-09 VITALS
WEIGHT: 181 LBS | OXYGEN SATURATION: 99 % | TEMPERATURE: 98 F | HEART RATE: 66 BPM | DIASTOLIC BLOOD PRESSURE: 60 MMHG | BODY MASS INDEX: 25.97 KG/M2 | SYSTOLIC BLOOD PRESSURE: 104 MMHG

## 2023-08-09 DIAGNOSIS — R60.0 EDEMA OF RIGHT LOWER EXTREMITY: ICD-10-CM

## 2023-08-09 DIAGNOSIS — F32.A DEPRESSION, UNSPECIFIED DEPRESSION TYPE: Primary | ICD-10-CM

## 2023-08-09 DIAGNOSIS — N18.31 STAGE 3A CHRONIC KIDNEY DISEASE (HCC): ICD-10-CM

## 2023-08-09 DIAGNOSIS — H61.22 IMPACTED CERUMEN OF LEFT EAR: ICD-10-CM

## 2023-08-09 PROCEDURE — 93971 EXTREMITY STUDY: CPT

## 2023-08-09 RX ORDER — LENALIDOMIDE 10 MG/1
CAPSULE ORAL
COMMUNITY
Start: 2018-09-01

## 2023-08-09 ASSESSMENT — ENCOUNTER SYMPTOMS
ABDOMINAL PAIN: 0
COUGH: 0
VOMITING: 0
APNEA: 0
DIARRHEA: 1
CHEST TIGHTNESS: 0
SHORTNESS OF BREATH: 0
CONSTIPATION: 0
NAUSEA: 0
BLOOD IN STOOL: 0

## 2023-08-09 NOTE — PROGRESS NOTES
Subjective:      Patient ID: Wendy Lewis is a 70 y.o. male who presents today for:     Chief Complaint   Patient presents with    Discuss Medications    Follow-up     Congestion in L ear, discuss ED medication        HPI  Wendy alexis 44-year-old male presents today to follow-up. Since last visit he states that his diarrhea is much better controlled and he has now gained weight back to his baseline and is eating normally. He has a follow-up with gastroenterology within 2 months. He has followed up with his oncologist as well and there are no changes in his current regimen. He has also been experiencing congestion in his left ear. He states that there is a crackling sensation and he noticed a piece of wax drop out of his ear last week. He had an irrigation of his left ear approximately 3 years ago. He denies any significant hearing deficit, discharge or pain  Patient also has a history of erectile dysfunction and would like to consider medication. Mr. Venancio Plunkett is also working with psychology at this time. He finds it to be helpful. He does notice a decrease in overall motivation but does not want to start any additional medication at this time. He is currently on Klonopin which has been helpful in controlling his panic attacks. This provided by his oncologist.  He denies any suicidal or homicidal ideations  Upon review of systems, patient was noted that he is at right lower extremity edema for over 3 years. He has never had an ultrasound to rule out DVT.   He states that it typically is not painful but has significant swelling which is pitting  Past Medical History:   Diagnosis Date    Cancer ([address])     Multiple Myeloma    Elevated blood sugar     Obesity      Past Surgical History:   Procedure Laterality Date    BONE MARROW BIOPSY      COLONOSCOPY N/A 8/16/2021    COLONOSCOPY with polypectomies and biopsies DIAGNOSTIC performed by Silver Vogt MD at [address]

## 2023-08-21 ENCOUNTER — HOSPITAL ENCOUNTER (OUTPATIENT)
Dept: LAB | Age: 68
Discharge: HOME | End: 2023-08-21
Payer: MEDICARE

## 2023-08-21 LAB
ALBUMIN SERPL-MCNC: 3.9 G/DL (ref 3.5–4.6)
ANION GAP SERPL CALCULATED.3IONS-SCNC: 9 MEQ/L (ref 9–15)
BUN SERPL-MCNC: 17 MG/DL (ref 8–23)
CALCIUM SERPL-MCNC: 8.8 MG/DL (ref 8.5–9.9)
CHLORIDE SERPL-SCNC: 113 MEQ/L (ref 95–107)
CO2 SERPL-SCNC: 22 MEQ/L (ref 20–31)
CREAT SERPL-MCNC: 1.33 MG/DL (ref 0.7–1.2)
GLUCOSE SERPL-MCNC: 83 MG/DL (ref 70–99)
PHOSPHATE SERPL-MCNC: 1.9 MG/DL (ref 2.3–4.8)
POTASSIUM SERPL-SCNC: 3.4 MEQ/L (ref 3.4–4.9)
SODIUM SERPL-SCNC: 144 MEQ/L (ref 135–144)

## 2023-08-21 PROCEDURE — 80069 RENAL FUNCTION PANEL: CPT

## 2023-08-21 PROCEDURE — 36415 COLL VENOUS BLD VENIPUNCTURE: CPT

## 2023-08-24 ENCOUNTER — OFFICE VISIT (OUTPATIENT)
Dept: BEHAVIORAL/MENTAL HEALTH CLINIC | Age: 68
End: 2023-08-24
Payer: MEDICARE

## 2023-08-24 DIAGNOSIS — F32.1 MAJOR DEPRESSIVE DISORDER, SINGLE EPISODE, MODERATE (HCC): Primary | ICD-10-CM

## 2023-08-24 PROCEDURE — 1036F TOBACCO NON-USER: CPT | Performed by: PSYCHOLOGIST

## 2023-08-24 PROCEDURE — 1123F ACP DISCUSS/DSCN MKR DOCD: CPT | Performed by: PSYCHOLOGIST

## 2023-08-24 PROCEDURE — 90832 PSYTX W PT 30 MINUTES: CPT | Performed by: PSYCHOLOGIST

## 2023-08-24 RX ORDER — SODIUM CHLORIDE 9 MG/ML
INJECTION, SOLUTION INTRAVENOUS CONTINUOUS
Status: CANCELLED | OUTPATIENT
Start: 2023-08-24

## 2023-08-24 RX ORDER — SODIUM CHLORIDE 0.9 % (FLUSH) 0.9 %
5-40 SYRINGE (ML) INJECTION EVERY 12 HOURS SCHEDULED
Status: CANCELLED | OUTPATIENT
Start: 2023-08-24

## 2023-08-24 RX ORDER — SODIUM CHLORIDE 9 MG/ML
INJECTION, SOLUTION INTRAVENOUS PRN
Status: CANCELLED | OUTPATIENT
Start: 2023-08-24

## 2023-08-24 ASSESSMENT — PATIENT HEALTH QUESTIONNAIRE - PHQ9
8. MOVING OR SPEAKING SO SLOWLY THAT OTHER PEOPLE COULD HAVE NOTICED. OR THE OPPOSITE, BEING SO FIGETY OR RESTLESS THAT YOU HAVE BEEN MOVING AROUND A LOT MORE THAN USUAL: 0
7. TROUBLE CONCENTRATING ON THINGS, SUCH AS READING THE NEWSPAPER OR WATCHING TELEVISION: 0
SUM OF ALL RESPONSES TO PHQ QUESTIONS 1-9: 3
SUM OF ALL RESPONSES TO PHQ9 QUESTIONS 1 & 2: 2
SUM OF ALL RESPONSES TO PHQ QUESTIONS 1-9: 3
9. THOUGHTS THAT YOU WOULD BE BETTER OFF DEAD, OR OF HURTING YOURSELF: 0
3. TROUBLE FALLING OR STAYING ASLEEP: 0
SUM OF ALL RESPONSES TO PHQ QUESTIONS 1-9: 3
10. IF YOU CHECKED OFF ANY PROBLEMS, HOW DIFFICULT HAVE THESE PROBLEMS MADE IT FOR YOU TO DO YOUR WORK, TAKE CARE OF THINGS AT HOME, OR GET ALONG WITH OTHER PEOPLE: 1
4. FEELING TIRED OR HAVING LITTLE ENERGY: 1
2. FEELING DOWN, DEPRESSED OR HOPELESS: 1
5. POOR APPETITE OR OVEREATING: 0
1. LITTLE INTEREST OR PLEASURE IN DOING THINGS: 1
6. FEELING BAD ABOUT YOURSELF - OR THAT YOU ARE A FAILURE OR HAVE LET YOURSELF OR YOUR FAMILY DOWN: 0
SUM OF ALL RESPONSES TO PHQ QUESTIONS 1-9: 3

## 2023-08-24 ASSESSMENT — ANXIETY QUESTIONNAIRES
IF YOU CHECKED OFF ANY PROBLEMS ON THIS QUESTIONNAIRE, HOW DIFFICULT HAVE THESE PROBLEMS MADE IT FOR YOU TO DO YOUR WORK, TAKE CARE OF THINGS AT HOME, OR GET ALONG WITH OTHER PEOPLE: VERY DIFFICULT
5. BEING SO RESTLESS THAT IT IS HARD TO SIT STILL: 1
GAD7 TOTAL SCORE: 11
4. TROUBLE RELAXING: 1
3. WORRYING TOO MUCH ABOUT DIFFERENT THINGS: 2
1. FEELING NERVOUS, ANXIOUS, OR ON EDGE: 2
6. BECOMING EASILY ANNOYED OR IRRITABLE: 3
2. NOT BEING ABLE TO STOP OR CONTROL WORRYING: 2
7. FEELING AFRAID AS IF SOMETHING AWFUL MIGHT HAPPEN: 0

## 2023-08-24 NOTE — PROGRESS NOTES
Behavioral Health Consultation  Patito Perkins, Ph.D., [facility]  Psychologist  8/24/23  2:00 PM EDT      Time spent with Patient: 30 minutes  This is patient's third  [facility] appointment. Reason for Consult:   depression and panic symptoms, chronic health concerns  Referring Provider: Emilie West MD      Feedback given to PCP. S:    Pt reports doing [place] mentally not ok physically\". Pt reports feeling \"overwhelmed\" related to his workload and notes the impact of doctor appts, \"the stress being put on me not being able to get my work done\". Reviewed the application of [facility] interventions since last appt and notes the breathing \"to be helpful\". Pt       Pt denies SI/HI    O:  MSE:    Appearance    alert, cooperative  Appetite normal  Sleep disturbance No  Fatigue No  Loss of pleasure No  Impulsive behavior at times  Speech    spontaneous, normal rate, and normal volume  Mood    appropriate  Affect    normal affect  Thought Content    intact  Thought Process    coherent  Associations    logical connections  Insight    Good  Judgment    Intact  Orientation    oriented to person, place, time, and general circumstances  Memory    recent and remote memory intact  Attention/Concentration    intact  Morbid ideation No  Suicide Assessment    no suicidal ideation      History:    Medications:   Current Outpatient Medications   Medication Sig Dispense Refill    lenalidomide (REVLIMID) 10 MG chemo capsule       omeprazole (PRILOSEC) 20 MG delayed release capsule Take 2 capsules by mouth daily 30 capsule 3    clonazePAM (KLONOPIN) 0.5 MG tablet Take 1 tablet by mouth 2 times daily as needed.       Ferrous Sulfate Dried (HIGH POTENCY IRON) 65 MG TABS Take by mouth      acetaminophen (TYLENOL) 325 MG tablet Take 2 tablets by mouth      aspirin 81 MG EC tablet Take 1 tablet by mouth      vitamin D-3 (CHOLECALCIFEROL) 5000 units TABS Take 1 tablet by mouth      loratadine (CLARITIN) 10 MG tablet Take 1 tablet by mouth      Multiple

## 2023-08-25 ENCOUNTER — ANESTHESIA EVENT (OUTPATIENT)
Dept: ENDOSCOPY | Age: 68
End: 2023-08-25
Payer: MEDICARE

## 2023-08-25 ENCOUNTER — HOSPITAL ENCOUNTER (OUTPATIENT)
Age: 68
Setting detail: OUTPATIENT SURGERY
Discharge: HOME | End: 2023-08-25
Attending: INTERNAL MEDICINE | Admitting: INTERNAL MEDICINE
Payer: MEDICARE

## 2023-08-25 ENCOUNTER — ANESTHESIA (OUTPATIENT)
Dept: ENDOSCOPY | Age: 68
End: 2023-08-25
Payer: MEDICARE

## 2023-08-25 VITALS
WEIGHT: 180 LBS | HEIGHT: 70 IN | BODY MASS INDEX: 25.77 KG/M2 | SYSTOLIC BLOOD PRESSURE: 122 MMHG | OXYGEN SATURATION: 97 % | TEMPERATURE: 98.6 F | HEART RATE: 83 BPM | DIASTOLIC BLOOD PRESSURE: 61 MMHG | RESPIRATION RATE: 18 BRPM

## 2023-08-25 DIAGNOSIS — R93.5 ABNORMAL CT OF THE ABDOMEN: ICD-10-CM

## 2023-08-25 DIAGNOSIS — K21.9 GASTROESOPHAGEAL REFLUX DISEASE WITHOUT ESOPHAGITIS: ICD-10-CM

## 2023-08-25 PROCEDURE — 6370000000 HC RX 637 (ALT 250 FOR IP): Performed by: INTERNAL MEDICINE

## 2023-08-25 PROCEDURE — 3609017100 HC EGD: Performed by: INTERNAL MEDICINE

## 2023-08-25 PROCEDURE — 7100000010 HC PHASE II RECOVERY - FIRST 15 MIN: Performed by: INTERNAL MEDICINE

## 2023-08-25 PROCEDURE — 2580000003 HC RX 258: Performed by: INTERNAL MEDICINE

## 2023-08-25 PROCEDURE — 7100000011 HC PHASE II RECOVERY - ADDTL 15 MIN: Performed by: INTERNAL MEDICINE

## 2023-08-25 PROCEDURE — 2709999900 HC NON-CHARGEABLE SUPPLY: Performed by: INTERNAL MEDICINE

## 2023-08-25 PROCEDURE — 2500000003 HC RX 250 WO HCPCS: Performed by: NURSE ANESTHETIST, CERTIFIED REGISTERED

## 2023-08-25 PROCEDURE — 43235 EGD DIAGNOSTIC BRUSH WASH: CPT | Performed by: INTERNAL MEDICINE

## 2023-08-25 PROCEDURE — 3700000000 HC ANESTHESIA ATTENDED CARE: Performed by: INTERNAL MEDICINE

## 2023-08-25 PROCEDURE — 6360000002 HC RX W HCPCS: Performed by: NURSE ANESTHETIST, CERTIFIED REGISTERED

## 2023-08-25 RX ORDER — SODIUM CHLORIDE 9 MG/ML
INJECTION, SOLUTION INTRAVENOUS PRN
Status: DISCONTINUED | OUTPATIENT
Start: 2023-08-25 | End: 2023-08-25 | Stop reason: HOSPADM

## 2023-08-25 RX ORDER — PROPOFOL 10 MG/ML
INJECTION, EMULSION INTRAVENOUS PRN
Status: DISCONTINUED | OUTPATIENT
Start: 2023-08-25 | End: 2023-08-25 | Stop reason: SDUPTHER

## 2023-08-25 RX ORDER — SIMETHICONE 20 MG/.3ML
EMULSION ORAL PRN
Status: DISCONTINUED | OUTPATIENT
Start: 2023-08-25 | End: 2023-08-25 | Stop reason: ALTCHOICE

## 2023-08-25 RX ORDER — LIDOCAINE HYDROCHLORIDE 20 MG/ML
INJECTION, SOLUTION INFILTRATION; PERINEURAL PRN
Status: DISCONTINUED | OUTPATIENT
Start: 2023-08-25 | End: 2023-08-25 | Stop reason: SDUPTHER

## 2023-08-25 RX ORDER — SODIUM CHLORIDE 9 MG/ML
INJECTION, SOLUTION INTRAVENOUS CONTINUOUS
Status: DISCONTINUED | OUTPATIENT
Start: 2023-08-25 | End: 2023-08-25 | Stop reason: HOSPADM

## 2023-08-25 RX ORDER — SODIUM CHLORIDE 0.9 % (FLUSH) 0.9 %
5-40 SYRINGE (ML) INJECTION EVERY 12 HOURS SCHEDULED
Status: DISCONTINUED | OUTPATIENT
Start: 2023-08-25 | End: 2023-08-25 | Stop reason: HOSPADM

## 2023-08-25 RX ORDER — AMILORIDE HYDROCHLORIDE 5 MG/1
5 TABLET ORAL DAILY
COMMUNITY

## 2023-08-25 RX ORDER — MAGNESIUM HYDROXIDE 1200 MG/15ML
LIQUID ORAL PRN
Status: DISCONTINUED | OUTPATIENT
Start: 2023-08-25 | End: 2023-08-25 | Stop reason: ALTCHOICE

## 2023-08-25 RX ORDER — GLYCOPYRROLATE 1 MG/5 ML
SYRINGE (ML) INTRAVENOUS PRN
Status: DISCONTINUED | OUTPATIENT
Start: 2023-08-25 | End: 2023-08-25 | Stop reason: SDUPTHER

## 2023-08-25 RX ORDER — SPIRONOLACTONE 25 MG/1
25 TABLET ORAL DAILY
COMMUNITY

## 2023-08-25 RX ADMIN — LIDOCAINE HYDROCHLORIDE 60 MG: 20 INJECTION, SOLUTION INFILTRATION; PERINEURAL at 13:39

## 2023-08-25 RX ADMIN — PROPOFOL 50 MG: 10 INJECTION, EMULSION INTRAVENOUS at 13:42

## 2023-08-25 RX ADMIN — PROPOFOL 50 MG: 10 INJECTION, EMULSION INTRAVENOUS at 13:44

## 2023-08-25 RX ADMIN — PROPOFOL 50 MG: 10 INJECTION, EMULSION INTRAVENOUS at 13:41

## 2023-08-25 RX ADMIN — Medication 0.2 MG: at 13:39

## 2023-08-25 RX ADMIN — SODIUM CHLORIDE: 9 INJECTION, SOLUTION INTRAVENOUS at 12:01

## 2023-08-25 ASSESSMENT — PAIN SCALES - GENERAL: PAINLEVEL_OUTOF10: 0

## 2023-08-25 ASSESSMENT — PAIN - FUNCTIONAL ASSESSMENT: PAIN_FUNCTIONAL_ASSESSMENT: 0-10

## 2023-08-25 NOTE — ANESTHESIA POSTPROCEDURE EVALUATION
Department of Anesthesiology  Postprocedure Note    Patient: Lien Victor  MRN: [id number]  YOB: 1955  Date of evaluation: 8/25/2023      Procedure Summary     Date: 08/25/23 Room / Location: [address] [place] [address]    Anesthesia Start: 1338 Anesthesia Stop: 1352    Procedure: EGD DIAGNOSTIC ONLY Diagnosis:       Diarrhea, unspecified type      Gastroesophageal reflux disease without esophagitis      Abnormal CT of the abdomen      (Diarrhea, unspecified type [R19.7])      (Gastroesophageal reflux disease without esophagitis [K21.9])      (Abnormal CT of the abdomen [R93.5])    Surgeons: Marcos Pak MD Responsible Provider: SARA Zavaleta CRNA    Anesthesia Type: MAC ASA Status: 3          Anesthesia Type: No value filed.     Guero Phase I: Guero Score: 10    Guero Phase II:        Anesthesia Post Evaluation    Patient location during evaluation: bedside  Patient participation: complete - patient participated  Level of consciousness: awake and awake and alert  Airway patency: patent  Nausea & Vomiting: no nausea and no vomiting  Complications: no  Cardiovascular status: blood pressure returned to baseline and hemodynamically stable  Respiratory status: acceptable  Hydration status: euvolemic  Pain management: adequate

## 2023-08-28 ENCOUNTER — HOSPITAL ENCOUNTER (OUTPATIENT)
Dept: LAB | Age: 68
Discharge: HOME | End: 2023-08-28
Payer: MEDICARE

## 2023-08-28 LAB
ALBUMIN SERPL-MCNC: 4 G/DL (ref 3.5–4.6)
ANION GAP SERPL CALCULATED.3IONS-SCNC: 15 MEQ/L (ref 9–15)
BUN SERPL-MCNC: 13 MG/DL (ref 8–23)
CALCIUM SERPL-MCNC: 8.6 MG/DL (ref 8.5–9.9)
CHLORIDE SERPL-SCNC: 105 MEQ/L (ref 95–107)
CO2 SERPL-SCNC: 22 MEQ/L (ref 20–31)
CREAT SERPL-MCNC: 1.33 MG/DL (ref 0.7–1.2)
GLUCOSE SERPL-MCNC: 97 MG/DL (ref 70–99)
PHOSPHATE SERPL-MCNC: 1.7 MG/DL (ref 2.3–4.8)
POTASSIUM SERPL-SCNC: 3 MEQ/L (ref 3.4–4.9)
SODIUM SERPL-SCNC: 142 MEQ/L (ref 135–144)

## 2023-08-28 PROCEDURE — 36415 COLL VENOUS BLD VENIPUNCTURE: CPT

## 2023-08-28 PROCEDURE — 80069 RENAL FUNCTION PANEL: CPT

## 2023-09-07 ENCOUNTER — OFFICE VISIT (OUTPATIENT)
Dept: BEHAVIORAL/MENTAL HEALTH CLINIC | Age: 68
End: 2023-09-07
Payer: MEDICARE

## 2023-09-07 DIAGNOSIS — F32.1 MAJOR DEPRESSIVE DISORDER, SINGLE EPISODE, MODERATE (HCC): Primary | ICD-10-CM

## 2023-09-07 PROCEDURE — 90832 PSYTX W PT 30 MINUTES: CPT | Performed by: PSYCHOLOGIST

## 2023-09-07 PROCEDURE — 1036F TOBACCO NON-USER: CPT | Performed by: PSYCHOLOGIST

## 2023-09-07 PROCEDURE — 1123F ACP DISCUSS/DSCN MKR DOCD: CPT | Performed by: PSYCHOLOGIST

## 2023-09-07 ASSESSMENT — ANXIETY QUESTIONNAIRES
GAD7 TOTAL SCORE: 10
5. BEING SO RESTLESS THAT IT IS HARD TO SIT STILL: 1
1. FEELING NERVOUS, ANXIOUS, OR ON EDGE: 2
6. BECOMING EASILY ANNOYED OR IRRITABLE: 2
IF YOU CHECKED OFF ANY PROBLEMS ON THIS QUESTIONNAIRE, HOW DIFFICULT HAVE THESE PROBLEMS MADE IT FOR YOU TO DO YOUR WORK, TAKE CARE OF THINGS AT HOME, OR GET ALONG WITH OTHER PEOPLE: SOMEWHAT DIFFICULT
7. FEELING AFRAID AS IF SOMETHING AWFUL MIGHT HAPPEN: 1
3. WORRYING TOO MUCH ABOUT DIFFERENT THINGS: 1
2. NOT BEING ABLE TO STOP OR CONTROL WORRYING: 1
4. TROUBLE RELAXING: 2

## 2023-09-07 ASSESSMENT — PATIENT HEALTH QUESTIONNAIRE - PHQ9
2. FEELING DOWN, DEPRESSED OR HOPELESS: 0
SUM OF ALL RESPONSES TO PHQ QUESTIONS 1-9: 2
3. TROUBLE FALLING OR STAYING ASLEEP: 0
7. TROUBLE CONCENTRATING ON THINGS, SUCH AS READING THE NEWSPAPER OR WATCHING TELEVISION: 0
6. FEELING BAD ABOUT YOURSELF - OR THAT YOU ARE A FAILURE OR HAVE LET YOURSELF OR YOUR FAMILY DOWN: 0
SUM OF ALL RESPONSES TO PHQ9 QUESTIONS 1 & 2: 1
9. THOUGHTS THAT YOU WOULD BE BETTER OFF DEAD, OR OF HURTING YOURSELF: 0
SUM OF ALL RESPONSES TO PHQ QUESTIONS 1-9: 2
8. MOVING OR SPEAKING SO SLOWLY THAT OTHER PEOPLE COULD HAVE NOTICED. OR THE OPPOSITE, BEING SO FIGETY OR RESTLESS THAT YOU HAVE BEEN MOVING AROUND A LOT MORE THAN USUAL: 0
5. POOR APPETITE OR OVEREATING: 0
10. IF YOU CHECKED OFF ANY PROBLEMS, HOW DIFFICULT HAVE THESE PROBLEMS MADE IT FOR YOU TO DO YOUR WORK, TAKE CARE OF THINGS AT HOME, OR GET ALONG WITH OTHER PEOPLE: 1
SUM OF ALL RESPONSES TO PHQ QUESTIONS 1-9: 2
1. LITTLE INTEREST OR PLEASURE IN DOING THINGS: 1
4. FEELING TIRED OR HAVING LITTLE ENERGY: 1
SUM OF ALL RESPONSES TO PHQ QUESTIONS 1-9: 2

## 2023-09-07 NOTE — PATIENT INSTRUCTIONS
Comparison- consider those two different types- one is comparison to others for perspective the other is comparison to your previous self.  We will NEVER be who we once were  Can vs Should   Staring at the [address]  Follow up as scheduled

## 2023-09-14 ENCOUNTER — OFFICE VISIT (OUTPATIENT)
Dept: FAMILY MEDICINE CLINIC | Age: 68
End: 2023-09-14
Payer: MEDICARE

## 2023-09-14 VITALS
WEIGHT: 178 LBS | HEIGHT: 70 IN | SYSTOLIC BLOOD PRESSURE: 100 MMHG | TEMPERATURE: 97.6 F | OXYGEN SATURATION: 99 % | BODY MASS INDEX: 25.48 KG/M2 | DIASTOLIC BLOOD PRESSURE: 60 MMHG | HEART RATE: 67 BPM

## 2023-09-14 DIAGNOSIS — Z12.5 PROSTATE CANCER SCREENING: ICD-10-CM

## 2023-09-14 DIAGNOSIS — Z00.00 MEDICARE ANNUAL WELLNESS VISIT, SUBSEQUENT: Primary | ICD-10-CM

## 2023-09-14 DIAGNOSIS — Z23 NEEDS FLU SHOT: ICD-10-CM

## 2023-09-14 PROCEDURE — G0439 PPPS, SUBSEQ VISIT: HCPCS | Performed by: FAMILY MEDICINE

## 2023-09-14 PROCEDURE — G0008 ADMIN INFLUENZA VIRUS VAC: HCPCS | Performed by: FAMILY MEDICINE

## 2023-09-14 PROCEDURE — 1123F ACP DISCUSS/DSCN MKR DOCD: CPT | Performed by: FAMILY MEDICINE

## 2023-09-14 PROCEDURE — 90694 VACC AIIV4 NO PRSRV 0.5ML IM: CPT | Performed by: FAMILY MEDICINE

## 2023-09-14 PROCEDURE — 3017F COLORECTAL CA SCREEN DOC REV: CPT | Performed by: FAMILY MEDICINE

## 2023-09-14 ASSESSMENT — PATIENT HEALTH QUESTIONNAIRE - PHQ9
10. IF YOU CHECKED OFF ANY PROBLEMS, HOW DIFFICULT HAVE THESE PROBLEMS MADE IT FOR YOU TO DO YOUR WORK, TAKE CARE OF THINGS AT HOME, OR GET ALONG WITH OTHER PEOPLE: 0
1. LITTLE INTEREST OR PLEASURE IN DOING THINGS: 0
SUM OF ALL RESPONSES TO PHQ9 QUESTIONS 1 & 2: 0
SUM OF ALL RESPONSES TO PHQ QUESTIONS 1-9: 0
6. FEELING BAD ABOUT YOURSELF - OR THAT YOU ARE A FAILURE OR HAVE LET YOURSELF OR YOUR FAMILY DOWN: 0
3. TROUBLE FALLING OR STAYING ASLEEP: 0
SUM OF ALL RESPONSES TO PHQ QUESTIONS 1-9: 0
9. THOUGHTS THAT YOU WOULD BE BETTER OFF DEAD, OR OF HURTING YOURSELF: 0
SUM OF ALL RESPONSES TO PHQ QUESTIONS 1-9: 0
7. TROUBLE CONCENTRATING ON THINGS, SUCH AS READING THE NEWSPAPER OR WATCHING TELEVISION: 0
2. FEELING DOWN, DEPRESSED OR HOPELESS: 0
5. POOR APPETITE OR OVEREATING: 0
SUM OF ALL RESPONSES TO PHQ QUESTIONS 1-9: 0
4. FEELING TIRED OR HAVING LITTLE ENERGY: 0
8. MOVING OR SPEAKING SO SLOWLY THAT OTHER PEOPLE COULD HAVE NOTICED. OR THE OPPOSITE, BEING SO FIGETY OR RESTLESS THAT YOU HAVE BEEN MOVING AROUND A LOT MORE THAN USUAL: 0

## 2023-09-14 ASSESSMENT — LIFESTYLE VARIABLES
HOW OFTEN DO YOU HAVE A DRINK CONTAINING ALCOHOL: MONTHLY OR LESS
HOW MANY STANDARD DRINKS CONTAINING ALCOHOL DO YOU HAVE ON A TYPICAL DAY: 1 OR 2

## 2023-09-14 NOTE — PROGRESS NOTES
Medications    Medication Sig Taking? Authorizing Provider   Diphenoxylate-Atropine (LOMOTIL PO) Take 2 mg by mouth daily Max Daily Amount: 2 mg  Historical Provider, MD   aMILoride (MIDAMOR) 5 MG tablet Take 1 tablet by mouth daily  Historical Provider, MD   spironolactone (ALDACTONE) 25 MG tablet Take 1 tablet by mouth daily  Historical Provider, MD   lenalidomide (REVLIMID) 10 MG chemo capsule   Historical Provider, MD   omeprazole (PRILOSEC) 20 MG delayed release capsule Take 2 capsules by mouth daily  SARA Boogie - CNP   clonazePAM (KLONOPIN) 0.5 MG tablet Take 1 tablet by mouth 2 times daily as needed.   Historical Provider, MD   Ferrous Sulfate Dried (HIGH POTENCY IRON) 65 MG TABS Take by mouth  Historical Provider, MD   acetaminophen (TYLENOL) 325 MG tablet Take 2 tablets by mouth  Historical Provider, MD   aspirin 81 MG EC tablet Take 1 tablet by mouth  Historical Provider, MD   vitamin D-3 (CHOLECALCIFEROL) 5000 units TABS Take 1 tablet by mouth  Historical Provider, MD   loratadine (CLARITIN) 10 MG tablet Take 1 tablet by mouth  Patient not taking: Reported on 8/25/2023  Historical Provider, MD   Multiple Vitamins-Minerals (MULTIVITAMIN ADULT PO) Take by mouth  Historical Provider, MD   calcium carbonate 600 MG TABS tablet Take 4 tablets by mouth daily  Historical Provider, MD Chu (Including outside providers/suppliers regularly involved in providing care):   Patient Care Team:  Aby Alejo MD as PCP - General (Family Medicine)  Aby Alejo MD as PCP - Empaneled Provider     Reviewed and updated this visit:  Allergies  Meds

## 2023-09-18 LAB
ALANINE AMINOTRANSFERASE (SGPT) (U/L) IN SER/PLAS: 13 U/L (ref 10–52)
ALBUMIN (G/DL) IN SER/PLAS: 3.9 G/DL (ref 3.4–5)
ALBUMIN: 3.9 G/DL (ref 3.4–5)
ALKALINE PHOSPHATASE (U/L) IN SER/PLAS: 80 U/L (ref 33–136)
ALP BLD-CCNC: 80 U/L (ref 33–136)
ALT SERPL-CCNC: 13 U/L (ref 10–52)
ANION GAP IN SER/PLAS: 11 MMOL/L (ref 10–20)
ANION GAP SERPL CALCULATED.3IONS-SCNC: 11 MMOL/L (ref 10–20)
ASPARTATE AMINOTRANSFERASE (SGOT) (U/L) IN SER/PLAS: 16 U/L (ref 9–39)
AST SERPL-CCNC: 16 U/L (ref 9–39)
BICARBONATE: 22 MMOL/L (ref 21–32)
BILIRUB SERPL-MCNC: 0.5 MG/DL (ref 0–1.2)
BILIRUBIN TOTAL (MG/DL) IN SER/PLAS: 0.5 MG/DL (ref 0–1.2)
CALCIUM (MG/DL) IN SER/PLAS: 8.9 MG/DL (ref 8.6–10.3)
CALCIUM SERPL-MCNC: 8.9 MG/DL (ref 8.6–10.3)
CARBON DIOXIDE, TOTAL (MMOL/L) IN SER/PLAS: 22 MMOL/L (ref 21–32)
CHLORIDE (MMOL/L) IN SER/PLAS: 113 MMOL/L (ref 98–107)
CHLORIDE BLD-SCNC: 113 MMOL/L (ref 98–107)
CREAT SERPL-MCNC: 1.47 MG/DL (ref 0.5–1.3)
CREATININE (MG/DL) IN SER/PLAS: 1.47 MG/DL (ref 0.5–1.3)
EGFR MALE: 51 ML/MIN/1.73M2
ERYTHROCYTE DISTRIBUTION WIDTH (RATIO) BY AUTOMATED COUNT: 16.4 % (ref 11.5–14.5)
ERYTHROCYTE MEAN CORPUSCULAR HEMOGLOBIN CONCENTRATION (G/DL) BY AUTOMATED: 31 G/DL (ref 32–36)
ERYTHROCYTE MEAN CORPUSCULAR VOLUME (FL) BY AUTOMATED COUNT: 109 FL (ref 80–100)
ERYTHROCYTE [DISTWIDTH] IN BLOOD BY AUTOMATED COUNT: 16.4 % (ref 11.5–14.5)
ERYTHROCYTES (10*6/UL) IN BLOOD BY AUTOMATED COUNT: 3.43 X10E12/L (ref 4.5–5.9)
GFR MALE: 51 ML/MIN/1.73M2
GLUCOSE (MG/DL) IN SER/PLAS: 131 MG/DL (ref 74–99)
GLUCOSE: 131 MG/DL (ref 74–99)
HCT VFR BLD CALC: 37.4 % (ref 41–52)
HEMATOCRIT (%) IN BLOOD BY AUTOMATED COUNT: 37.4 % (ref 41–52)
HEMOGLOBIN (G/DL) IN BLOOD: 11.6 G/DL (ref 13.5–17.5)
HEMOGLOBIN: 11.6 G/DL (ref 13.5–17.5)
INR BLD: 1.1 (ref 0.9–1.1)
INR IN PPP BY COAGULATION ASSAY: 1.1 (ref 0.9–1.1)
LEUKOCYTES (10*3/UL) IN BLOOD BY AUTOMATED COUNT: 6.4 X10E9/L (ref 4.4–11.3)
MCHC RBC AUTO-ENTMCNC: 31 G/DL (ref 32–36)
MCV RBC AUTO: 109 FL (ref 80–100)
PLATELET # BLD: 143 X10E9/L (ref 150–450)
PLATELETS (10*3/UL) IN BLOOD AUTOMATED COUNT: 143 X10E9/L (ref 150–450)
POTASSIUM (MMOL/L) IN SER/PLAS: 3.8 MMOL/L (ref 3.5–5.3)
POTASSIUM SERPL-SCNC: 3.8 MMOL/L (ref 3.5–5.3)
PROTEIN TOTAL: 6.5 G/DL (ref 6.4–8.2)
PROTHROMBIN TIME (PT) IN PPP BY COAGULATION ASSAY: 11.9 SEC (ref 9.8–12.8)
PROTHROMBIN TIME: 11.9 SEC (ref 9.8–12.8)
RBC # BLD: 3.43 X10E12/L (ref 4.5–5.9)
SODIUM (MMOL/L) IN SER/PLAS: 142 MMOL/L (ref 136–145)
SODIUM BLD-SCNC: 142 MMOL/L (ref 136–145)
TOTAL PROTEIN: 6.5 G/DL (ref 6.4–8.2)
UREA NITROGEN (MG/DL) IN SER/PLAS: 19 MG/DL (ref 6–23)
UREA NITROGEN: 19 MG/DL (ref 6–23)
WBC: 6.4 X10E9/L (ref 4.4–11.3)

## 2023-09-19 ENCOUNTER — HOSPITAL ENCOUNTER (OUTPATIENT)
Dept: DATA CONVERSION | Facility: HOSPITAL | Age: 68
End: 2023-09-19
Attending: INTERNAL MEDICINE | Admitting: INTERNAL MEDICINE
Payer: COMMERCIAL

## 2023-09-19 DIAGNOSIS — Z79.82 LONG TERM (CURRENT) USE OF ASPIRIN: ICD-10-CM

## 2023-09-19 DIAGNOSIS — T82.897A OTHER SPECIFIED COMPLICATION OF CARDIAC PROSTHETIC DEVICES, IMPLANTS AND GRAFTS, INITIAL ENCOUNTER: ICD-10-CM

## 2023-09-19 DIAGNOSIS — T82.110A BREAKDOWN (MECHANICAL) OF CARDIAC ELECTRODE, INITIAL ENCOUNTER: ICD-10-CM

## 2023-09-19 DIAGNOSIS — I49.5 SICK SINUS SYNDROME (MULTI): ICD-10-CM

## 2023-09-20 LAB
ATRIAL RATE: 70 BPM
ATRIAL RATE: 73 BPM
P AXIS: 43 DEGREES
P AXIS: 80 DEGREES
P OFFSET: 122 MS
P OFFSET: 133 MS
P ONSET: 42 MS
P ONSET: 73 MS
PR INTERVAL: 306 MS
PR INTERVAL: 354 MS
Q ONSET: 220 MS
Q ONSET: 226 MS
QRS COUNT: 11 BEATS
QRS COUNT: 13 BEATS
QRS DURATION: 72 MS
QRS DURATION: 86 MS
QT INTERVAL: 398 MS
QT INTERVAL: 416 MS
QTC CALCULATION(BAZETT): 438 MS
QTC CALCULATION(BAZETT): 449 MS
QTC FREDERICIA: 425 MS
QTC FREDERICIA: 438 MS
R AXIS: -13 DEGREES
R AXIS: -8 DEGREES
T AXIS: 11 DEGREES
T AXIS: 4 DEGREES
T OFFSET: 425 MS
T OFFSET: 428 MS
VENTRICULAR RATE: 70 BPM
VENTRICULAR RATE: 73 BPM

## 2023-09-29 VITALS — BODY MASS INDEX: 24.93 KG/M2 | WEIGHT: 173.72 LBS

## 2023-09-30 NOTE — H&P
History & Physical Reviewed:   I have reviewed the History and Physical dated:  23-Aug-2023   History and Physical reviewed and relevant findings noted. Patient examined to review pertinent physical  findings.: No significant changes   Home Medications Reviewed: no changes noted   Allergies Reviewed: no changes noted       Airway/Sedation Assessment:  ·  Mouth Opening OK yes   ·  Neck Flexibility OK yes   ·  Loose Teeth no   ·  Oropharyngeal Classification Class II       ERAS (Enhanced Recovery After Surgery):  ·  ERAS Patient: no     Consent:   COVID-19 Consent:  ·  COVID-19 Risk Consent Surgeon has reviewed key risks related to the risk of carlos enrique COVID-19 and if they contract COVID-19 what the risks are.       Electronic Signatures:  Grant Rhodes)  (Signed 19-Sep-2023 10:11)   Authored: History & Physical Reviewed, Airway/Sedation,  ERAS, Consent, Note Completion      Last Updated: 19-Sep-2023 10:11 by Grant Rhodes)

## 2023-10-02 ENCOUNTER — OFFICE VISIT (OUTPATIENT)
Dept: BEHAVIORAL/MENTAL HEALTH CLINIC | Age: 68
End: 2023-10-02
Payer: MEDICARE

## 2023-10-02 DIAGNOSIS — F32.1 MAJOR DEPRESSIVE DISORDER, SINGLE EPISODE, MODERATE (HCC): Primary | ICD-10-CM

## 2023-10-02 PROCEDURE — 90832 PSYTX W PT 30 MINUTES: CPT | Performed by: PSYCHOLOGIST

## 2023-10-02 PROCEDURE — 1123F ACP DISCUSS/DSCN MKR DOCD: CPT | Performed by: PSYCHOLOGIST

## 2023-10-02 PROCEDURE — 1036F TOBACCO NON-USER: CPT | Performed by: PSYCHOLOGIST

## 2023-10-02 ASSESSMENT — ANXIETY QUESTIONNAIRES
7. FEELING AFRAID AS IF SOMETHING AWFUL MIGHT HAPPEN: 0
GAD7 TOTAL SCORE: 6
IF YOU CHECKED OFF ANY PROBLEMS ON THIS QUESTIONNAIRE, HOW DIFFICULT HAVE THESE PROBLEMS MADE IT FOR YOU TO DO YOUR WORK, TAKE CARE OF THINGS AT HOME, OR GET ALONG WITH OTHER PEOPLE: SOMEWHAT DIFFICULT
4. TROUBLE RELAXING: 1
3. WORRYING TOO MUCH ABOUT DIFFERENT THINGS: 2
6. BECOMING EASILY ANNOYED OR IRRITABLE: 1
1. FEELING NERVOUS, ANXIOUS, OR ON EDGE: 1
2. NOT BEING ABLE TO STOP OR CONTROL WORRYING: 1
5. BEING SO RESTLESS THAT IT IS HARD TO SIT STILL: 0

## 2023-10-02 ASSESSMENT — PATIENT HEALTH QUESTIONNAIRE - PHQ9
3. TROUBLE FALLING OR STAYING ASLEEP: 1
4. FEELING TIRED OR HAVING LITTLE ENERGY: 1
10. IF YOU CHECKED OFF ANY PROBLEMS, HOW DIFFICULT HAVE THESE PROBLEMS MADE IT FOR YOU TO DO YOUR WORK, TAKE CARE OF THINGS AT HOME, OR GET ALONG WITH OTHER PEOPLE: 1
8. MOVING OR SPEAKING SO SLOWLY THAT OTHER PEOPLE COULD HAVE NOTICED. OR THE OPPOSITE, BEING SO FIGETY OR RESTLESS THAT YOU HAVE BEEN MOVING AROUND A LOT MORE THAN USUAL: 0
SUM OF ALL RESPONSES TO PHQ QUESTIONS 1-9: 3
SUM OF ALL RESPONSES TO PHQ9 QUESTIONS 1 & 2: 1
5. POOR APPETITE OR OVEREATING: 0
2. FEELING DOWN, DEPRESSED OR HOPELESS: 0
7. TROUBLE CONCENTRATING ON THINGS, SUCH AS READING THE NEWSPAPER OR WATCHING TELEVISION: 0
6. FEELING BAD ABOUT YOURSELF - OR THAT YOU ARE A FAILURE OR HAVE LET YOURSELF OR YOUR FAMILY DOWN: 0
SUM OF ALL RESPONSES TO PHQ QUESTIONS 1-9: 3
1. LITTLE INTEREST OR PLEASURE IN DOING THINGS: 1
9. THOUGHTS THAT YOU WOULD BE BETTER OFF DEAD, OR OF HURTING YOURSELF: 0
SUM OF ALL RESPONSES TO PHQ QUESTIONS 1-9: 3
SUM OF ALL RESPONSES TO PHQ QUESTIONS 1-9: 3

## 2023-10-02 NOTE — PROGRESS NOTES
tablet Take 2 tablets by mouth      aspirin 81 MG EC tablet Take 1 tablet by mouth      vitamin D-3 (CHOLECALCIFEROL) 5000 units TABS Take 1 tablet by mouth      loratadine (CLARITIN) 10 MG tablet Take 1 tablet by mouth (Patient not taking: Reported on 2023)      Multiple Vitamins-Minerals (MULTIVITAMIN ADULT PO) Take by mouth      calcium carbonate 600 MG TABS tablet Take 4 tablets by mouth daily       No current facility-administered medications for this visit. Social History:   Social History     Socioeconomic History    Marital status:      Spouse name: Not on file    Number of children: Not on file    Years of education: Not on file    Highest education level: Not on file   Occupational History    Not on file   Tobacco Use    Smoking status: Former     Packs/day: 0.25     Years: 4.00     Additional pack years: 0.00     Total pack years: 1.00     Types: Cigarettes     Quit date: 65     Years since quittin.7    Smokeless tobacco: Never   Vaping Use    Vaping Use: Never used   Substance and Sexual Activity    Alcohol use: Not Currently     Alcohol/week: 1.0 - 2.0 standard drink of alcohol     Types: 1 - 2 Glasses of wine per week     Comment: Pt. is a social drinker on weekends    Drug use: Yes     Types: Marijuana (Weed)     Comment:  on the weekends    Sexual activity: Yes     Partners: Female   Other Topics Concern    Not on file   Social History Narrative    Not on file     Social Determinants of Health     Financial Resource Strain: Low Risk  (2023)    Overall Financial Resource Strain (CARDIA)     Difficulty of Paying Living Expenses: Not hard at all   Food Insecurity: No Food Insecurity (2023)    Hunger Vital Sign     Worried About Running Out of Food in the Last Year: Never true     [address] in the Last Year: Never true   Transportation Needs: Unknown (2023)    PRAPARE - Transportation     Lack of Transportation (Medical):  Not on file     Lack of

## 2023-10-02 NOTE — PATIENT INSTRUCTIONS
Your worry is not stupid- let's determine if it's helpful  If you can talk about it, no matter what it is, you can do something to resolve it or address it. Consider that idea of the shared responsibility/decision making for the best type of support.   Follow up as scheduled

## 2023-10-26 ENCOUNTER — OFFICE VISIT (OUTPATIENT)
Dept: BEHAVIORAL/MENTAL HEALTH CLINIC | Age: 68
End: 2023-10-26
Payer: MEDICARE

## 2023-10-26 DIAGNOSIS — F32.1 MAJOR DEPRESSIVE DISORDER, SINGLE EPISODE, MODERATE (HCC): Primary | ICD-10-CM

## 2023-10-26 PROCEDURE — 90832 PSYTX W PT 30 MINUTES: CPT | Performed by: PSYCHOLOGIST

## 2023-10-26 PROCEDURE — 1123F ACP DISCUSS/DSCN MKR DOCD: CPT | Performed by: PSYCHOLOGIST

## 2023-10-26 PROCEDURE — 1036F TOBACCO NON-USER: CPT | Performed by: PSYCHOLOGIST

## 2023-10-26 ASSESSMENT — PATIENT HEALTH QUESTIONNAIRE - PHQ9
7. TROUBLE CONCENTRATING ON THINGS, SUCH AS READING THE NEWSPAPER OR WATCHING TELEVISION: 1
SUM OF ALL RESPONSES TO PHQ QUESTIONS 1-9: 4
SUM OF ALL RESPONSES TO PHQ QUESTIONS 1-9: 4
9. THOUGHTS THAT YOU WOULD BE BETTER OFF DEAD, OR OF HURTING YOURSELF: 0
10. IF YOU CHECKED OFF ANY PROBLEMS, HOW DIFFICULT HAVE THESE PROBLEMS MADE IT FOR YOU TO DO YOUR WORK, TAKE CARE OF THINGS AT HOME, OR GET ALONG WITH OTHER PEOPLE: 0
3. TROUBLE FALLING OR STAYING ASLEEP: 0
4. FEELING TIRED OR HAVING LITTLE ENERGY: 1
SUM OF ALL RESPONSES TO PHQ9 QUESTIONS 1 & 2: 2
6. FEELING BAD ABOUT YOURSELF - OR THAT YOU ARE A FAILURE OR HAVE LET YOURSELF OR YOUR FAMILY DOWN: 0
1. LITTLE INTEREST OR PLEASURE IN DOING THINGS: 1
5. POOR APPETITE OR OVEREATING: 0
2. FEELING DOWN, DEPRESSED OR HOPELESS: 1
SUM OF ALL RESPONSES TO PHQ QUESTIONS 1-9: 4
8. MOVING OR SPEAKING SO SLOWLY THAT OTHER PEOPLE COULD HAVE NOTICED. OR THE OPPOSITE, BEING SO FIGETY OR RESTLESS THAT YOU HAVE BEEN MOVING AROUND A LOT MORE THAN USUAL: 0
SUM OF ALL RESPONSES TO PHQ QUESTIONS 1-9: 4

## 2023-10-26 ASSESSMENT — ANXIETY QUESTIONNAIRES
6. BECOMING EASILY ANNOYED OR IRRITABLE: 1
5. BEING SO RESTLESS THAT IT IS HARD TO SIT STILL: 1
3. WORRYING TOO MUCH ABOUT DIFFERENT THINGS: 2
1. FEELING NERVOUS, ANXIOUS, OR ON EDGE: 2
IF YOU CHECKED OFF ANY PROBLEMS ON THIS QUESTIONNAIRE, HOW DIFFICULT HAVE THESE PROBLEMS MADE IT FOR YOU TO DO YOUR WORK, TAKE CARE OF THINGS AT HOME, OR GET ALONG WITH OTHER PEOPLE: SOMEWHAT DIFFICULT
2. NOT BEING ABLE TO STOP OR CONTROL WORRYING: 2
4. TROUBLE RELAXING: 1
GAD7 TOTAL SCORE: 10
7. FEELING AFRAID AS IF SOMETHING AWFUL MIGHT HAPPEN: 1

## 2023-10-26 NOTE — PATIENT INSTRUCTIONS
No matter what IT is, if you can talk about it, you can do something to resolve  it  Consider the   Let's involve the  related to possible Rx programs  Just Word thinking- Life is not fair and bad things happen to good people and it's not a reflection of what we deserve.   Follow up as scheduled

## 2023-10-26 NOTE — PROGRESS NOTES
tablet Take 1 tablet by mouth      vitamin D-3 (CHOLECALCIFEROL) 5000 units TABS Take 1 tablet by mouth      loratadine (CLARITIN) 10 MG tablet Take 1 tablet by mouth (Patient not taking: Reported on 2023)      Multiple Vitamins-Minerals (MULTIVITAMIN ADULT PO) Take by mouth      calcium carbonate 600 MG TABS tablet Take 4 tablets by mouth daily       No current facility-administered medications for this visit. Social History:   Social History     Socioeconomic History    Marital status:      Spouse name: Not on file    Number of children: Not on file    Years of education: Not on file    Highest education level: Not on file   Occupational History    Not on file   Tobacco Use    Smoking status: Former     Packs/day: 0.25     Years: 4.00     Additional pack years: 0.00     Total pack years: 1.00     Types: Cigarettes     Quit date: 65     Years since quittin.8    Smokeless tobacco: Never   Vaping Use    Vaping Use: Never used   Substance and Sexual Activity    Alcohol use: Not Currently     Alcohol/week: 1.0 - 2.0 standard drink of alcohol     Types: 1 - 2 Glasses of wine per week     Comment: Pt. is a social drinker on weekends    Drug use: Yes     Types: Marijuana (Weed)     Comment:  on the weekends    Sexual activity: Yes     Partners: Female   Other Topics Concern    Not on file   Social History Narrative    Not on file     Social Determinants of Health     Financial Resource Strain: Low Risk  (2023)    Overall Financial Resource Strain (CARDIA)     Difficulty of Paying Living Expenses: Not hard at all   Food Insecurity: No Food Insecurity (2023)    Hunger Vital Sign     Worried About Running Out of Food in the Last Year: Never true     [address] in the Last Year: Never true   Transportation Needs: Unknown (2023)    PRAPARE - Transportation     Lack of Transportation (Medical): Not on file     Lack of Transportation (Non-Medical):  No   Physical Activity:

## 2023-10-27 ENCOUNTER — CARE COORDINATION (OUTPATIENT)
Dept: CARE COORDINATION | Age: 68
End: 2023-10-27

## 2023-10-27 NOTE — CARE COORDINATION
Initial Contact Social Work Note - Ambulatory  10/27/2023      Date of referral: 10/26/2023  Referral received from: Dr. Matilda Arias  Reason for referral: Rx programs    Previous SW referral: No  If yes, brief summary of outcome: N/A    Two Identifiers Verified: Yes    Insurance Provider: Medicare A and B, [facility] [place]    Support System:  Spouse/Partner and Adult Child/Children    [place] Status:  No    Community Providers: Local Behavioral Provider    ADL Assistance Needed: N/A    Housing/Living Concerns or Home Modification Needs: N/A     Transportation Concern: No    Medication Cost Concern: Yes, pt's Revlimid is very expensive, pt is interested in prescription assistance for when he retires next year    Medication Adherence Concern: No    Financial Concern(s): No    Income (only if applicable): Yes, pt is currently employed, making $10,000/month    Ability to Read/Write: No    Advance Care Plan:  N/A    Other: N/A    Identified Needs:  Prescription assistance    Social Work Plan: Made referral to Zander Roman    Next Steps: [facility] to f/u    Method of Communication With Provider (if appropriate): Chart Routing       Goals Addressed    None      [facility] made call to pt, introduced self and reviewed purpose of call. Pt requested call back in 15 minutes. Made call back to pt, reviewed referral, completed SW assessment. Pt is interested in Prescription Assistance program for his prescription Revlimid, which is very costly for pt. Pt reports he plans to retire next year and is trying to plan/get on an assistance program to assist with cost, d/t retiring next year. Pt currently works, income is $10,000/monthly. Pt reports he has looked at an option of an insurance company for when he retires, but medication would still cost him over $7,000/year. [facility] to make referral to Medication Assistance  Zander Roman and this [facility] to f/u with pt next week. Made referral to Medication Assistance, Zander Roman.  Neda Robles

## 2023-10-27 NOTE — CARE COORDINATION
I left the patient a detailed message with [employer] support programs phone number. He would need to give them a call and they can let him know the guidelines to the program and if he would be eligible.           [facility]   Medication Assistance  [address], and [place] [employer]    (T) [phone]  (N) [phone]

## 2023-10-30 ENCOUNTER — CARE COORDINATION (OUTPATIENT)
Dept: CARE COORDINATION | Age: 68
End: 2023-10-30

## 2023-10-30 NOTE — CARE COORDINATION
[facility] made f/u call to pt, he has spoken with [address] [place] [address] Specialist and has resource info to look into. No questions reported at this time. [facility] to f/u in a few weeks.     Lilo SAHA, [place]   Care Coordinator  [phone]

## 2023-11-01 ENCOUNTER — HOSPITAL ENCOUNTER (OUTPATIENT)
Dept: CARDIOLOGY | Facility: HOSPITAL | Age: 68
Discharge: HOME | End: 2023-11-01
Payer: MEDICARE

## 2023-11-01 DIAGNOSIS — Z95.0 PACEMAKER: Primary | ICD-10-CM

## 2023-11-01 DIAGNOSIS — I49.5 SICK SINUS SYNDROME (MULTI): ICD-10-CM

## 2023-11-01 DIAGNOSIS — Z95.0 PACEMAKER: ICD-10-CM

## 2023-11-01 PROCEDURE — 93294 REM INTERROG EVL PM/LDLS PM: CPT | Performed by: INTERNAL MEDICINE

## 2023-11-01 PROCEDURE — 93296 REM INTERROG EVL PM/IDS: CPT

## 2023-11-13 ENCOUNTER — CARE COORDINATION (OUTPATIENT)
Dept: CARE COORDINATION | Age: 68
End: 2023-11-13

## 2023-11-13 NOTE — CARE COORDINATION
[facility] made f/u call to pt, he reports he has not applied yet for prescription assistance, has the info from [employer]. Pt did not have any questions or needs at this time. [facility] to f/u, as final outreach in a few weeks.

## 2023-12-04 ENCOUNTER — CARE COORDINATION (OUTPATIENT)
Dept: CARE COORDINATION | Age: 68
End: 2023-12-04

## 2023-12-04 NOTE — CARE COORDINATION
[facility] made f/u call to pt, he reports he has contact info for prescription assistance program that was provided to him and does not have any questions/needs at this time. [facility] explained this [facility] to sign off but encouraged pt to call this [facility] with any SW needs that may arise.     Lilo Rajan MSW, [facility]   Care Coordinator  [phone]

## 2023-12-14 ENCOUNTER — OFFICE VISIT (OUTPATIENT)
Dept: BEHAVIORAL/MENTAL HEALTH CLINIC | Age: 68
End: 2023-12-14
Payer: MEDICARE

## 2023-12-14 DIAGNOSIS — F32.1 MAJOR DEPRESSIVE DISORDER, SINGLE EPISODE, MODERATE (HCC): Primary | ICD-10-CM

## 2023-12-14 PROCEDURE — 90832 PSYTX W PT 30 MINUTES: CPT | Performed by: PSYCHOLOGIST

## 2023-12-14 PROCEDURE — 1036F TOBACCO NON-USER: CPT | Performed by: PSYCHOLOGIST

## 2023-12-14 PROCEDURE — 1123F ACP DISCUSS/DSCN MKR DOCD: CPT | Performed by: PSYCHOLOGIST

## 2023-12-14 ASSESSMENT — ANXIETY QUESTIONNAIRES
IF YOU CHECKED OFF ANY PROBLEMS ON THIS QUESTIONNAIRE, HOW DIFFICULT HAVE THESE PROBLEMS MADE IT FOR YOU TO DO YOUR WORK, TAKE CARE OF THINGS AT HOME, OR GET ALONG WITH OTHER PEOPLE: SOMEWHAT DIFFICULT
6. BECOMING EASILY ANNOYED OR IRRITABLE: 1
5. BEING SO RESTLESS THAT IT IS HARD TO SIT STILL: 0
4. TROUBLE RELAXING: 1
7. FEELING AFRAID AS IF SOMETHING AWFUL MIGHT HAPPEN: 0
3. WORRYING TOO MUCH ABOUT DIFFERENT THINGS: 2
2. NOT BEING ABLE TO STOP OR CONTROL WORRYING: 1
GAD7 TOTAL SCORE: 5
1. FEELING NERVOUS, ANXIOUS, OR ON EDGE: 0

## 2023-12-14 ASSESSMENT — PATIENT HEALTH QUESTIONNAIRE - PHQ9
4. FEELING TIRED OR HAVING LITTLE ENERGY: 1
5. POOR APPETITE OR OVEREATING: 0
SUM OF ALL RESPONSES TO PHQ QUESTIONS 1-9: 3
10. IF YOU CHECKED OFF ANY PROBLEMS, HOW DIFFICULT HAVE THESE PROBLEMS MADE IT FOR YOU TO DO YOUR WORK, TAKE CARE OF THINGS AT HOME, OR GET ALONG WITH OTHER PEOPLE: 1
3. TROUBLE FALLING OR STAYING ASLEEP: 0
2. FEELING DOWN, DEPRESSED OR HOPELESS: 1
9. THOUGHTS THAT YOU WOULD BE BETTER OFF DEAD, OR OF HURTING YOURSELF: 0
8. MOVING OR SPEAKING SO SLOWLY THAT OTHER PEOPLE COULD HAVE NOTICED. OR THE OPPOSITE, BEING SO FIGETY OR RESTLESS THAT YOU HAVE BEEN MOVING AROUND A LOT MORE THAN USUAL: 0
SUM OF ALL RESPONSES TO PHQ9 QUESTIONS 1 & 2: 2
7. TROUBLE CONCENTRATING ON THINGS, SUCH AS READING THE NEWSPAPER OR WATCHING TELEVISION: 0
6. FEELING BAD ABOUT YOURSELF - OR THAT YOU ARE A FAILURE OR HAVE LET YOURSELF OR YOUR FAMILY DOWN: 0
SUM OF ALL RESPONSES TO PHQ QUESTIONS 1-9: 3
1. LITTLE INTEREST OR PLEASURE IN DOING THINGS: 1

## 2023-12-14 NOTE — PROGRESS NOTES
Behavioral Health Consultation  Patito Mcdowell, Ph.D., [facility]  Psychologist  23  3:03 PM EST      Time spent with Patient: 30 minutes  This is patient's seventh  [facility] appointment. Reason for Consult:    depression and panic symptoms, chronic health concerns   Referring Provider: Chuck Niño MD    Feedback given to PCP. S:    Pt notes doing \"pretty good\" and describes feeling \"oddly reassured\" in some of his decision making. Pt notes in the past two weeks two close friends , with two prior to that earlier in the year. Pt has decided to retire at the end of next year as result, communicating this. Pt notes relief with the decision. Pt provided an update on functioning, benefit of new pacemaker to increased energy, improving mood. Pt denies SI/HI    O:  MSE:    Appearance    alert, cooperative  Appetite normal  Sleep disturbance No  Fatigue No  Loss of pleasure No  Impulsive behavior No  Speech    spontaneous, normal rate, and normal volume  Mood    apporpriate  Affect    normal affect  Thought Content    intact  Thought Process    coherent  Associations    logical connections  Insight    Good  Judgment    Intact  Orientation    oriented to person, place, time, and general circumstances  Memory    recent and remote memory intact  Attention/Concentration    intact  Morbid ideation No  Suicide Assessment    no suicidal ideation      History:    Medications:   Current Outpatient Medications   Medication Sig Dispense Refill    Diphenoxylate-Atropine (LOMOTIL PO) Take 2 mg by mouth daily Max Daily Amount: 2 mg      aMILoride (MIDAMOR) 5 MG tablet Take 1 tablet by mouth daily      spironolactone (ALDACTONE) 25 MG tablet Take 1 tablet by mouth daily      lenalidomide (REVLIMID) 10 MG chemo capsule       omeprazole (PRILOSEC) 20 MG delayed release capsule Take 2 capsules by mouth daily 30 capsule 3    clonazePAM (KLONOPIN) 0.5 MG tablet Take 1 tablet by mouth 2 times daily as needed.       Ferrous

## 2023-12-14 NOTE — PATIENT INSTRUCTIONS
Have confidence in your decision  Consider those points on value and identity  Acceptance is not that I am okay it, rather it is going to be okay  Follow up as scheduled

## 2023-12-18 PROBLEM — C90.01 MULTIPLE MYELOMA IN REMISSION (MULTI): Status: ACTIVE | Noted: 2017-11-01

## 2023-12-18 PROBLEM — F41.9 ANXIETY DISORDER: Status: ACTIVE | Noted: 2023-12-18

## 2023-12-18 PROBLEM — Z95.0 PACEMAKER: Status: ACTIVE | Noted: 2023-12-18

## 2023-12-18 PROBLEM — D68.9 COAGULATION DEFECT (MULTI): Status: ACTIVE | Noted: 2021-06-15

## 2023-12-18 PROBLEM — I49.5 SINUS NODE DYSFUNCTION (MULTI): Status: ACTIVE | Noted: 2023-12-18

## 2023-12-18 PROBLEM — C79.51 SECONDARY MALIGNANT NEOPLASM OF BONE (MULTI): Status: ACTIVE | Noted: 2021-06-15

## 2023-12-18 PROBLEM — N18.30 CHRONIC RENAL DISEASE, STAGE III (MULTI): Status: ACTIVE | Noted: 2022-06-16

## 2023-12-18 RX ORDER — ASPIRIN 81 MG/1
81 TABLET ORAL DAILY
COMMUNITY

## 2023-12-18 RX ORDER — FERROUS SULFATE 325(65) MG
325 TABLET ORAL
COMMUNITY

## 2023-12-18 RX ORDER — LENALIDOMIDE 10 MG/1
10 CAPSULE ORAL DAILY
COMMUNITY

## 2023-12-18 RX ORDER — CLONAZEPAM 0.5 MG/1
0.5 TABLET ORAL 2 TIMES DAILY PRN
COMMUNITY

## 2023-12-18 RX ORDER — ONDANSETRON 4 MG/1
4 TABLET, ORALLY DISINTEGRATING ORAL
COMMUNITY
Start: 2023-06-30 | End: 2024-01-24 | Stop reason: WASHOUT

## 2023-12-18 RX ORDER — POTASSIUM CHLORIDE 20 MEQ/1
40 TABLET, EXTENDED RELEASE ORAL 2 TIMES DAILY
COMMUNITY
Start: 2023-10-03

## 2023-12-18 RX ORDER — OMEPRAZOLE 40 MG/1
40 CAPSULE, DELAYED RELEASE ORAL
COMMUNITY

## 2023-12-18 RX ORDER — SPIRONOLACTONE 25 MG/1
25 TABLET ORAL DAILY
COMMUNITY
Start: 2023-10-03

## 2023-12-18 RX ORDER — ACETAMINOPHEN 325 MG/1
325 TABLET ORAL EVERY 4 HOURS PRN
COMMUNITY

## 2023-12-18 RX ORDER — DIPHENOXYLATE HYDROCHLORIDE AND ATROPINE SULFATE 2.5; .025 MG/1; MG/1
1 TABLET ORAL DAILY
COMMUNITY
Start: 2023-09-11

## 2023-12-18 RX ORDER — ACETAMINOPHEN 500 MG
2000 TABLET ORAL DAILY
COMMUNITY

## 2023-12-27 ENCOUNTER — APPOINTMENT (OUTPATIENT)
Dept: CARDIOLOGY | Facility: HOSPITAL | Age: 68
End: 2023-12-27
Payer: COMMERCIAL

## 2023-12-27 ENCOUNTER — APPOINTMENT (OUTPATIENT)
Dept: CARDIOLOGY | Facility: CLINIC | Age: 68
End: 2023-12-27
Payer: COMMERCIAL

## 2024-01-17 ENCOUNTER — OFFICE VISIT (OUTPATIENT)
Dept: BEHAVIORAL/MENTAL HEALTH CLINIC | Age: 69
End: 2024-01-17
Payer: MEDICARE

## 2024-01-17 DIAGNOSIS — F32.1 MAJOR DEPRESSIVE DISORDER, SINGLE EPISODE, MODERATE (HCC): Primary | ICD-10-CM

## 2024-01-17 PROCEDURE — 1123F ACP DISCUSS/DSCN MKR DOCD: CPT | Performed by: PSYCHOLOGIST

## 2024-01-17 PROCEDURE — 1036F TOBACCO NON-USER: CPT | Performed by: PSYCHOLOGIST

## 2024-01-17 PROCEDURE — 90832 PSYTX W PT 30 MINUTES: CPT | Performed by: PSYCHOLOGIST

## 2024-01-17 ASSESSMENT — PATIENT HEALTH QUESTIONNAIRE - PHQ9
1. LITTLE INTEREST OR PLEASURE IN DOING THINGS: 0
10. IF YOU CHECKED OFF ANY PROBLEMS, HOW DIFFICULT HAVE THESE PROBLEMS MADE IT FOR YOU TO DO YOUR WORK, TAKE CARE OF THINGS AT HOME, OR GET ALONG WITH OTHER PEOPLE: 1
8. MOVING OR SPEAKING SO SLOWLY THAT OTHER PEOPLE COULD HAVE NOTICED. OR THE OPPOSITE, BEING SO FIGETY OR RESTLESS THAT YOU HAVE BEEN MOVING AROUND A LOT MORE THAN USUAL: 0
5. POOR APPETITE OR OVEREATING: 1
6. FEELING BAD ABOUT YOURSELF - OR THAT YOU ARE A FAILURE OR HAVE LET YOURSELF OR YOUR FAMILY DOWN: 0
3. TROUBLE FALLING OR STAYING ASLEEP: 1
SUM OF ALL RESPONSES TO PHQ QUESTIONS 1-9: 3
4. FEELING TIRED OR HAVING LITTLE ENERGY: 1
SUM OF ALL RESPONSES TO PHQ QUESTIONS 1-9: 3
2. FEELING DOWN, DEPRESSED OR HOPELESS: 0
9. THOUGHTS THAT YOU WOULD BE BETTER OFF DEAD, OR OF HURTING YOURSELF: 0
SUM OF ALL RESPONSES TO PHQ9 QUESTIONS 1 & 2: 0
7. TROUBLE CONCENTRATING ON THINGS, SUCH AS READING THE NEWSPAPER OR WATCHING TELEVISION: 0
SUM OF ALL RESPONSES TO PHQ QUESTIONS 1-9: 3
SUM OF ALL RESPONSES TO PHQ QUESTIONS 1-9: 3

## 2024-01-17 ASSESSMENT — ANXIETY QUESTIONNAIRES
6. BECOMING EASILY ANNOYED OR IRRITABLE: 1
GAD7 TOTAL SCORE: 4
3. WORRYING TOO MUCH ABOUT DIFFERENT THINGS: 1
1. FEELING NERVOUS, ANXIOUS, OR ON EDGE: 1
IF YOU CHECKED OFF ANY PROBLEMS ON THIS QUESTIONNAIRE, HOW DIFFICULT HAVE THESE PROBLEMS MADE IT FOR YOU TO DO YOUR WORK, TAKE CARE OF THINGS AT HOME, OR GET ALONG WITH OTHER PEOPLE: NOT DIFFICULT AT ALL
7. FEELING AFRAID AS IF SOMETHING AWFUL MIGHT HAPPEN: 0
4. TROUBLE RELAXING: 0
5. BEING SO RESTLESS THAT IT IS HARD TO SIT STILL: 0
2. NOT BEING ABLE TO STOP OR CONTROL WORRYING: 1

## 2024-01-17 NOTE — PROGRESS NOTES
Behavioral Health Consultation  Patito Chilel, Ph.D., [facility]  Psychologist  1/17/24  12:52 PM EST      Time spent with Patient: 30 minutes  This is patient's eighth  [facility] appointment.    Reason for Consult:  depression and panic symptoms, chronic health concerns    Referring Provider: Torrey Arias MD    Feedback given to PCP.    S:    Pt reports doing \"pretty well\", feels calm. Pt notes resolving numerous stressors over the past few months. Pt notes \"crappy\" holidays related to covid. Pt did enjoy time with family prior to that. Pt notes he is committed to his decision to retire at the end of this year, plan for partial income until that time. 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      Reviewed progress in treatment.    Pt denies SI/HI    O:  MSE:    Appearance    alert, cooperative strong odor of THC  Appetite normal  Sleep disturbance No  Fatigue No  Loss of pleasure No  Impulsive behavior No  Speech    spontaneous, normal rate, and normal volume  Mood    appropriate  Affect    normal affect  Thought Content    intact  Thought Process    coherent  Associations    logical connections  Insight    Good  Judgment    Intact  Orientation    oriented to person, place, time, and general circumstances  Memory    recent and remote memory intact  Attention/Concentration    intact  Morbid ideation No  Suicide Assessment    no suicidal ideation      History:    Medications:   Current Outpatient Medications   Medication Sig Dispense Refill    Diphenoxylate-Atropine (LOMOTIL PO) Take 2 mg by mouth daily Max Daily Amount: 2 mg      aMILoride (MIDAMOR) 5 MG tablet Take 1 tablet by mouth daily      spironolactone (ALDACTONE) 25 MG tablet Take 1 tablet by mouth daily      lenalidomide (REVLIMID) 10 MG chemo capsule       omeprazole (PRILOSEC) 20 MG delayed release capsule Take 2 capsules by mouth daily 30 capsule 3    clonazePAM (KLONOPIN) 0.5 MG tablet Take 1 tablet by mouth 2 times daily as

## 2024-01-17 NOTE — PATIENT INSTRUCTIONS
Consider some of that creative problem solving with the laundry  Nutritionist?  The more specific and measurable you make a goal, the more likely you are to meet.  Schedule the time, share your plan, consider your reward system  Tracker?  Follow up as scheduled

## 2024-01-24 ENCOUNTER — HOSPITAL ENCOUNTER (OUTPATIENT)
Dept: CARDIOLOGY | Facility: HOSPITAL | Age: 69
Discharge: HOME | End: 2024-01-24
Payer: MEDICARE

## 2024-01-24 ENCOUNTER — OFFICE VISIT (OUTPATIENT)
Dept: CARDIOLOGY | Facility: CLINIC | Age: 69
End: 2024-01-24
Payer: MEDICARE

## 2024-01-24 VITALS
WEIGHT: 182 LBS | HEART RATE: 72 BPM | BODY MASS INDEX: 26.05 KG/M2 | DIASTOLIC BLOOD PRESSURE: 60 MMHG | SYSTOLIC BLOOD PRESSURE: 100 MMHG | HEIGHT: 70 IN

## 2024-01-24 DIAGNOSIS — I49.5 SINUS NODE DYSFUNCTION (MULTI): ICD-10-CM

## 2024-01-24 DIAGNOSIS — I49.5 SICK SINUS SYNDROME (MULTI): ICD-10-CM

## 2024-01-24 DIAGNOSIS — D68.9 COAGULATION DEFECT (MULTI): ICD-10-CM

## 2024-01-24 DIAGNOSIS — Z95.0 PACEMAKER: Primary | ICD-10-CM

## 2024-01-24 DIAGNOSIS — Z95.0 PACEMAKER: ICD-10-CM

## 2024-01-24 PROCEDURE — 1159F MED LIST DOCD IN RCRD: CPT | Performed by: NURSE PRACTITIONER

## 2024-01-24 PROCEDURE — 93290 INTERROG DEV EVAL ICPMS IP: CPT | Performed by: INTERNAL MEDICINE

## 2024-01-24 PROCEDURE — 99214 OFFICE O/P EST MOD 30 MIN: CPT | Performed by: NURSE PRACTITIONER

## 2024-01-24 PROCEDURE — 93280 PM DEVICE PROGR EVAL DUAL: CPT | Performed by: INTERNAL MEDICINE

## 2024-01-24 PROCEDURE — 1036F TOBACCO NON-USER: CPT | Performed by: NURSE PRACTITIONER

## 2024-01-24 PROCEDURE — 1157F ADVNC CARE PLAN IN RCRD: CPT | Performed by: NURSE PRACTITIONER

## 2024-01-24 PROCEDURE — 93280 PM DEVICE PROGR EVAL DUAL: CPT

## 2024-01-24 RX ORDER — CALCIUM CARBONATE 600 MG
2 TABLET ORAL
COMMUNITY

## 2024-01-24 NOTE — PROGRESS NOTES
"CARDIOLOGY OFFICE VISIT      CHIEF COMPLAINT  Chief Complaint   Patient presents with    Device Check     Pt here today for 91 day visit follow-up.     Chief complaint: \"I have a lot more energy since the new lead was put in.\"  HISTORY OF PRESENT ILLNESS  HPI  History: The patient is a 68-year-old  male who is followed for multiple myeloma on maintenance chemotherapy.  He underwent implantation of a dual-chamber pacemaker on May 6, 2017 for sinus node dysfunction and had a history of chronically elevated right ventricular lead capture threshold.  He underwent placement of a new right ventricular lead on September 19, 2023 and generator change out.  He presents the office today for follow-up evaluation.  Patient did not obtain a chest x-ray prior to today's office visit.  He reports that he has much more energy since the new lead was implanted.  Patient states that he has also been dealing with panic attacks but is now seeing a psychologist which is really improved the patient, providing him with strategies to deal with the stress.  The patient reports that his wife is scheduled to have a procedure tomorrow and he will be providing care for her for the next week.  The patient denies chest pain, dizziness, lightheadedness, shortness of breath, or abdominal distention.  He does experience right lower extremity swelling and wears a compression stocking twice a week as needed.  He states that he does keep the leg elevated when in a seated position which helps to control the swelling as well.  Patient states that he does experience palpitations during his panic attacks.  He reports that the panic attacks are becoming less frequent now.  Past Medical History  Past Medical History:   Diagnosis Date    Anxiety     Multiple myeloma (CMS/HCC)     Sinus node dysfunction (CMS/HCC)        Social History  Social History     Tobacco Use    Smoking status: Former     Packs/day: 1     Types: Cigarettes     Quit date: 1992    "  Years since quittin.0    Smokeless tobacco: Never   Substance Use Topics    Alcohol use: Not Currently    Drug use: Never       Family History     Family History   Problem Relation Name Age of Onset    No Known Problems Mother      No Known Problems Father          Allergies:  Allergies   Allergen Reactions    Penicillins Swelling and Rash    Iodine GI Upset    Lactose GI Upset    Shellfish Derived Nausea/vomiting        Outpatient Medications:  Current Outpatient Medications   Medication Instructions    acetaminophen (TYLENOL) 325 mg, oral, Every 4 hours PRN    aspirin 81 mg, oral, Daily    calcium carbonate (Calcium 600) 600 mg calcium (1,500 mg) tablet 2 tablets, oral, 2 times daily with meals    cholecalciferol (VITAMIN D-3) 2,000 Units, oral, Daily    clonazePAM (KLONOPIN) 0.5 mg, oral, 2 times daily PRN    diphenoxylate-atropine (Lomotil) 2.5-0.025 mg tablet 1 tablet, oral, Daily    ferrous sulfate (325 mg ferrous sulfate) 325 mg, oral, Daily with breakfast    omeprazole (PRILOSEC) 40 mg, oral, Daily before breakfast    potassium chloride CR 20 mEq ER tablet 40 mEq, oral, 2 times daily    Revlimid 10 mg, oral, Daily    spironolactone (ALDACTONE) 25 mg, oral, Daily          REVIEW OF SYSTEMS  Review of Systems   All other systems reviewed and are negative.        VITALS  Vitals:    24 0925   BP: 100/60   Pulse: 72       PHYSICAL EXAM  Vitals and nursing note reviewed.   Constitutional:       Appearance: Normal appearance.   HENT:      Head: Normocephalic.   Neck:      Vascular: No JVD.   Cardiovascular:      Rate and Rhythm: Normal rate and regular rhythm.      Pulses: Normal pulses.      Heart sounds: Normal heart sounds.      No lower extremity swelling noted on today's physical exam.  Pulmonary:      Effort: Pulmonary effort is normal.      Breath sounds: Normal breath sounds.   Abdominal:      General: Bowel sounds are normal.      Palpations: Abdomen is soft.   Musculoskeletal:         General:  Normal range of motion.      Cervical back: Normal range of motion.   Skin:     General: Skin is warm and dry.  Left subclavian pacemaker pocket is well-healed without redness swelling or drainage.  Neurological:      General: No focal deficit present.      Mental Status: She is alert and oriented to person, place, and time.      Motor: Motor function is intact.   Psychiatric:         Attention and Perception: Attention and perception normal.         Mood and Affect: Mood and affect normal.         Speech: Speech normal.         Behavior: Behavior normal. Behavior is cooperative.         Thought Content: Thought content normal.         Cognition and Memory: Cognition and memory normal.     Labs and testing: Twelve-lead EKG reveals atrial pacing and ventricular tracking at 72 bpm.  Prolonged AV conduction with a MS interval of 288 ms.  QRS durations 84 ms,  ms, QTc 420 ms.  Pacemaker interrogation dated January 24, 2024 revealed atrial pacing 83.5% and ventricular pacing 0.3%.  1 nonsustained VT detection was noted on the Paceart report.  I was unable to access the intracardiac electrograms through epic.  Patient reports the episode was in September, 2023.  No additional arrhythmic events were noted.  Good sensing and capture thresholds are noted.  Estimated battery longevity is 13 years and 4 months.      ASSESSMENT AND PLAN      Clinical impressions:  1. Sinus node dysfunction status post dual-chamber pacemaker implant ([employer]tr[employer] Hiram PADILLA) on March 6, 2017.  2. Chronically elevated right ventricular lead capture thresholds.  Status postplacement of a new right ventricular lead and generator change out on September 19, 2023.  3. Multiple myeloma on maintenance chemotherapy.  4. Anxiety disorder with reported panic attacks followed by his primary care physician.  5. Overweight with a BMI of 26.11.    Recommendations:  1.  Continue current medications as prescribed.  2.  Discussed routine device follow up  is scheduled every 3-4 months.  Inclinic checks alternate with remote (home) checks.  Inclinic checks will be scheduled prior to the office visit with Electrophysiology.  Patient will undergo a remote device interrogation on April 29, 2024 and in clinic check in 6 months prior to the office visit with Dr. Rhodes.  3.  Follow-up in office with Dr. Rhodes in 6 months or sooner if needed.  4.  Instructed patient to always carry the device card in their wallet.  No wanding of the device at the airport or [facility].  Do not carry cell phone in the shirt pocket on the side of the implant.  Utilize the ear on the opposite side of the device.  Refrain from environments with electromagnetic interference (SRI).  5.  The patient will obtain a PA and lateral chest x-ray as soon as possible for evaluation of right ventricular lead position.    Evaluation and note by Marva Corona CNP  **Please excuse any errors in grammar or translation related to this dictation.  Voice recognition software was utilized to prepare this document.**

## 2024-02-14 ENCOUNTER — HOSPITAL ENCOUNTER (OUTPATIENT)
Dept: LAB | Age: 69
Discharge: HOME | End: 2024-02-14

## 2024-02-14 LAB
ALBUMIN SERPL-MCNC: 4 G/DL (ref 3.5–4.6)
ANION GAP SERPL CALCULATED.3IONS-SCNC: 13 MEQ/L (ref 9–15)
BUN SERPL-MCNC: 20 MG/DL (ref 8–23)
CALCIUM SERPL-MCNC: 8.7 MG/DL (ref 8.5–9.9)
CHLORIDE SERPL-SCNC: 109 MEQ/L (ref 95–107)
CO2 SERPL-SCNC: 21 MEQ/L (ref 20–31)
CREAT SERPL-MCNC: 1.56 MG/DL (ref 0.7–1.2)
CREAT UR-MCNC: 147.5 MG/DL
ERYTHROCYTE [DISTWIDTH] IN BLOOD BY AUTOMATED COUNT: 15.9 % (ref 11.5–14.5)
GLUCOSE SERPL-MCNC: 92 MG/DL (ref 70–99)
HCT VFR BLD AUTO: 34.7 % (ref 42–52)
HGB BLD-MCNC: 11.1 G/DL (ref 14–18)
MCH RBC QN AUTO: 33.2 PG (ref 27–31.3)
MCHC RBC AUTO-ENTMCNC: 32 % (ref 33–37)
MCV RBC AUTO: 103.9 FL (ref 79–92.2)
MICROALBUMIN UR-MCNC: 1.3 MG/DL
MICROALBUMIN/CREAT UR-RTO: 8.8 MG/G (ref 0–30)
PHOSPHATE SERPL-MCNC: 2.9 MG/DL (ref 2.3–4.8)
PLATELET # BLD AUTO: 74 K/UL (ref 130–400)
POTASSIUM SERPL-SCNC: 3.9 MEQ/L (ref 3.4–4.9)
RBC # BLD AUTO: 3.34 M/UL (ref 4.7–6.1)
SODIUM SERPL-SCNC: 143 MEQ/L (ref 135–144)
TSH SERPL-MCNC: 2.25 UIU/ML (ref 0.44–3.86)
WBC # BLD AUTO: 5.7 K/UL (ref 4.8–10.8)

## 2024-02-15 LAB — VITAMIN D 25-HYDROXY: 28.9 NG/ML (ref 30–100)

## 2024-02-17 LAB
COMMENT: ABNORMAL
MISCELLANEOUS LAB TEST RESULT: ABNORMAL

## 2024-04-29 ENCOUNTER — HOSPITAL ENCOUNTER (OUTPATIENT)
Dept: CARDIOLOGY | Facility: HOSPITAL | Age: 69
Discharge: HOME | End: 2024-04-29
Payer: MEDICARE

## 2024-04-29 DIAGNOSIS — Z95.0 PACEMAKER: ICD-10-CM

## 2024-04-29 DIAGNOSIS — I49.5 SICK SINUS SYNDROME (MULTI): ICD-10-CM

## 2024-04-29 PROCEDURE — 93294 REM INTERROG EVL PM/LDLS PM: CPT | Performed by: INTERNAL MEDICINE

## 2024-04-29 PROCEDURE — 93296 REM INTERROG EVL PM/IDS: CPT

## 2024-07-24 ENCOUNTER — APPOINTMENT (OUTPATIENT)
Dept: CARDIOLOGY | Facility: CLINIC | Age: 69
End: 2024-07-24
Payer: COMMERCIAL

## 2024-07-24 ENCOUNTER — APPOINTMENT (OUTPATIENT)
Dept: CARDIOLOGY | Facility: HOSPITAL | Age: 69
End: 2024-07-24
Payer: COMMERCIAL

## 2024-09-04 ENCOUNTER — APPOINTMENT (OUTPATIENT)
Dept: CARDIOLOGY | Facility: CLINIC | Age: 69
End: 2024-09-04
Payer: COMMERCIAL

## 2024-09-04 ENCOUNTER — HOSPITAL ENCOUNTER (OUTPATIENT)
Dept: CARDIOLOGY | Facility: HOSPITAL | Age: 69
Discharge: HOME | End: 2024-09-04
Payer: COMMERCIAL

## 2024-09-04 VITALS
DIASTOLIC BLOOD PRESSURE: 72 MMHG | HEART RATE: 75 BPM | HEIGHT: 70 IN | SYSTOLIC BLOOD PRESSURE: 130 MMHG | WEIGHT: 196 LBS | BODY MASS INDEX: 28.06 KG/M2

## 2024-09-04 DIAGNOSIS — Z95.0 PACEMAKER: ICD-10-CM

## 2024-09-04 DIAGNOSIS — Z87.891 FORMER SMOKER: Primary | ICD-10-CM

## 2024-09-04 DIAGNOSIS — I49.5 SINUS NODE DYSFUNCTION (MULTI): ICD-10-CM

## 2024-09-04 PROCEDURE — 1157F ADVNC CARE PLAN IN RCRD: CPT | Performed by: INTERNAL MEDICINE

## 2024-09-04 PROCEDURE — 93280 PM DEVICE PROGR EVAL DUAL: CPT

## 2024-09-04 PROCEDURE — 99214 OFFICE O/P EST MOD 30 MIN: CPT | Performed by: INTERNAL MEDICINE

## 2024-09-04 PROCEDURE — 93000 ELECTROCARDIOGRAM COMPLETE: CPT | Performed by: INTERNAL MEDICINE

## 2024-09-04 PROCEDURE — 1036F TOBACCO NON-USER: CPT | Performed by: INTERNAL MEDICINE

## 2024-09-04 PROCEDURE — 93280 PM DEVICE PROGR EVAL DUAL: CPT | Performed by: INTERNAL MEDICINE

## 2024-09-04 PROCEDURE — 1159F MED LIST DOCD IN RCRD: CPT | Performed by: INTERNAL MEDICINE

## 2024-09-04 PROCEDURE — 3008F BODY MASS INDEX DOCD: CPT | Performed by: INTERNAL MEDICINE

## 2024-09-04 ASSESSMENT — ENCOUNTER SYMPTOMS
SHORTNESS OF BREATH: 0
WHEEZING: 0
CLAUDICATION: 0
CARDIOVASCULAR NEGATIVE: 1
COUGH: 0
SNORING: 0
SYNCOPE: 0
NEAR-SYNCOPE: 0
DYSPNEA ON EXERTION: 0
PND: 0
ORTHOPNEA: 0

## 2024-09-04 NOTE — PATIENT INSTRUCTIONS
Follow up in 6 months, and 1 year with Dr. Vines   Remote device checks will occur at 3 and 9 month intervals.  In clinic device checks are to be done every 6 months, on the same day of your appointment.    Continue same medications and treatments.   Patient educated on proper medication use.   Patient educated on risk factor modification.   Please bring any lab results from other providers / physicians to your next appointment.     Please bring all medicines, vitamins, and herbal supplements with you when you come to the office.     Prescriptions will not be filled unless you are compliant with your follow up appointments or have a follow up appointment scheduled as per instruction of your physician. Refills should be requested at the time of your visit.  IDEB LPN, AM SCRIBING FOR, AND IN THE PRESENCE OF DR. DAGOBERTO VINES MD

## 2024-09-04 NOTE — PROGRESS NOTES
CARDIOLOGY OFFICE VISIT      CHIEF COMPLAINT  Chief Complaint   Patient presents with    Follow-up     6 months       HISTORY OF PRESENT ILLNESS  HPI  69-year-old  male who is followed for multiple myeloma on maintenance chemotherapy. He underwent implantation of a dual-chamber pacemaker on May 6, 2017 for sinus node dysfunction and had a history of chronically elevated right ventricular lead capture threshold. He underwent placement of a new right ventricular lead on 2023 and generator change out. He presents the office today for follow-up evaluation.    Since the last office visit he has been doing well.  He denies any symptoms of chest pain or shortness breath or palpitations.    EKG performed today shows atrial paced rhythm at a rate of 75 bpm QRS ration 80 ms QT corrected 442 ms.  Rhythm strip shows the same pattern.    Patient had a device interrogation today at the device clinic and it shows dual-chamber pacemaker Medtronic with battery longevity 12 years 8 months.  [place] of atrial fibrillation less than 0.1% of the time.  No high ventricular events noted.      Past Medical History  Past Medical History:   Diagnosis Date    Anxiety     Multiple myeloma (Multi)     Sinus node dysfunction (Multi)        Social History  Social History     Tobacco Use    Smoking status: Former     Current packs/day: 0.00     Types: Cigarettes     Quit date:      Years since quittin.6    Smokeless tobacco: Never   Substance Use Topics    Alcohol use: Not Currently    Drug use: Never       Family History     Family History   Problem Relation Name Age of Onset    No Known Problems Mother      No Known Problems Father          Allergies:  Allergies   Allergen Reactions    Penicillins Swelling and Rash    Iodine GI Upset    Lactose GI Upset    Shellfish Derived Nausea/vomiting        Outpatient Medications:  Current Outpatient Medications   Medication Instructions    acetaminophen (TYLENOL) 325 mg,  oral, Every 4 hours PRN    aspirin 81 mg, oral, Daily    calcium carbonate (Calcium 600) 600 mg calcium (1,500 mg) tablet 2 tablets, oral, 2 times daily (morning and late afternoon)    cholecalciferol (VITAMIN D-3) 2,000 Units, oral, Daily    clonazePAM (KLONOPIN) 0.5 mg, oral, 2 times daily PRN    diphenoxylate-atropine (Lomotil) 2.5-0.025 mg tablet 1 tablet, oral, Daily    ferrous sulfate (325 mg ferrous sulfate) 325 mg, oral, Daily with breakfast    omeprazole (PRILOSEC) 40 mg, oral, Daily before breakfast    potassium chloride CR 20 mEq ER tablet 40 mEq, oral, 2 times daily    Revlimid 10 mg, oral, Daily, Every other week          REVIEW OF SYSTEMS  Review of Systems   Constitutional: Negative for malaise/fatigue.   Cardiovascular: Negative.  Negative for claudication, cyanosis, dyspnea on exertion, leg swelling, near-syncope, orthopnea, paroxysmal nocturnal dyspnea and syncope.   Respiratory:  Negative for cough, shortness of breath, snoring and wheezing.    All other systems reviewed and are negative.        VITALS  Vitals:    09/04/24 1057   BP: 130/72   Pulse: 75       PHYSICAL EXAM  Constitutional:       Appearance: Normal and healthy appearance. Well-developed and not in distress.      Comments: Left sided device well healed   Neck:      Vascular: No JVR. JVD normal.   Pulmonary:      Effort: Pulmonary effort is normal.      Breath sounds: Normal breath sounds. No wheezing. No rhonchi. No rales.   Chest:      Chest wall: Not tender to palpatation.   Cardiovascular:      PMI at left midclavicular line. Normal rate. Regular rhythm. Normal S1. Normal S2.       Murmurs: There is no murmur.      No gallop.  No click. No rub.   Pulses:     Intact distal pulses.   Edema:     Peripheral edema absent.   Abdominal:      Tenderness: There is no abdominal tenderness.   Musculoskeletal: Normal range of motion.         General: No tenderness. Skin:     General: Skin is warm and dry.   Neurological:      General: No  focal deficit present.      Mental Status: Alert and oriented to person, place and time.           ASSESSMENT AND PLAN  Clinical impressions:  1. Sinus node dysfunction status post dual-chamber pacemaker implant (Medtronic Hiram PADILLA) on March 6, 2017.  2. Chronically elevated right ventricular lead capture thresholds.  Status postplacement of a new right ventricular lead and generator change out on September 19, 2023.  3. Multiple myeloma on maintenance chemotherapy.  4. Anxiety disorder with reported panic attacks followed by his primary care physician.  5. Overweight with a BMI of 26.11.    Plan recommendations    Patient is doing well from the electrophysiology standpoint.  Continue with current medical therapy.    Follow my office every 6 months or sooner needed.    Follow device clinic as scheduled.    Will watch the burden of atrial and ventricular arrhythmias by device interrogation.    Risk factor modification and lifestyle modification discussed with patient. Diet , exercise and hydration discussed with patient.    I have personally review with patient during this office visit, laboratory data, echocardiogram results, stress test results, Holter-event monitor results prior and after the last electrophysiology visit. All questions has been answered.    Please excuse any errors in grammar or translation related to this dictation.  Voice recognition software was utilized to prepare this document.  ramirez

## 2024-09-13 ENCOUNTER — HOSPITAL ENCOUNTER (OUTPATIENT)
Dept: LAB | Age: 69
Discharge: HOME | End: 2024-09-13

## 2024-09-13 LAB
ALBUMIN SERPL-MCNC: 4.1 G/DL (ref 3.5–4.6)
ANION GAP SERPL CALCULATED.3IONS-SCNC: 9 MEQ/L (ref 9–15)
BUN SERPL-MCNC: 21 MG/DL (ref 8–23)
CALCIUM SERPL-MCNC: 9 MG/DL (ref 8.5–9.9)
CHLORIDE SERPL-SCNC: 110 MEQ/L (ref 95–107)
CO2 SERPL-SCNC: 22 MEQ/L (ref 20–31)
CREAT SERPL-MCNC: 1.34 MG/DL (ref 0.7–1.2)
CREAT UR-MCNC: 129 MG/DL
ERYTHROCYTE [DISTWIDTH] IN BLOOD BY AUTOMATED COUNT: 15.9 % (ref 11.5–14.5)
GLUCOSE SERPL-MCNC: 98 MG/DL (ref 70–99)
HCT VFR BLD AUTO: 38.1 % (ref 42–52)
HGB BLD-MCNC: 12.4 G/DL (ref 14–18)
MCH RBC QN AUTO: 34.4 PG (ref 27–31.3)
MCHC RBC AUTO-ENTMCNC: 32.5 % (ref 33–37)
MCV RBC AUTO: 105.8 FL (ref 79–92.2)
PHOSPHATE SERPL-MCNC: 2.2 MG/DL (ref 2.3–4.8)
PLATELET # BLD AUTO: 94 K/UL (ref 130–400)
POTASSIUM SERPL-SCNC: 4.3 MEQ/L (ref 3.4–4.9)
PROT UR-MCNC: 19 MG/DL
PROT/CREAT UR-RTO: 0.1 ML/ML
PROT/CREAT UR-RTO: 0.1 ML/ML (ref 0–0.2)
RBC # BLD AUTO: 3.6 M/UL (ref 4.7–6.1)
SODIUM SERPL-SCNC: 141 MEQ/L (ref 135–144)
TSH SERPL-MCNC: 1.4 UIU/ML (ref 0.44–3.86)
WBC # BLD AUTO: 4.9 K/UL (ref 4.8–10.8)

## 2024-09-14 LAB — VITAMIN D 25-HYDROXY: 30.3 NG/ML (ref 30–100)

## 2024-09-15 LAB
COMMENT: NORMAL
MISCELLANEOUS LAB TEST RESULT: NORMAL

## 2024-09-16 ENCOUNTER — OFFICE VISIT (OUTPATIENT)
Dept: FAMILY MEDICINE CLINIC | Age: 69
End: 2024-09-16

## 2024-09-16 VITALS
OXYGEN SATURATION: 99 % | SYSTOLIC BLOOD PRESSURE: 112 MMHG | TEMPERATURE: 97.1 F | BODY MASS INDEX: 28.63 KG/M2 | DIASTOLIC BLOOD PRESSURE: 62 MMHG | HEIGHT: 70 IN | WEIGHT: 200 LBS | HEART RATE: 81 BPM

## 2024-09-16 DIAGNOSIS — C79.51 METASTASIS TO BONE (HCC): ICD-10-CM

## 2024-09-16 DIAGNOSIS — Z23 ENCOUNTER FOR IMMUNIZATION: ICD-10-CM

## 2024-09-16 DIAGNOSIS — Z00.00 MEDICARE ANNUAL WELLNESS VISIT, SUBSEQUENT: ICD-10-CM

## 2024-09-16 DIAGNOSIS — Z12.5 PROSTATE CANCER SCREENING: ICD-10-CM

## 2024-09-16 DIAGNOSIS — Z00.00 ENCOUNTER FOR ANNUAL WELLNESS VISIT (AWV) IN MEDICARE PATIENT: Primary | ICD-10-CM

## 2024-09-16 DIAGNOSIS — Z23 NEED FOR VACCINATION WITH 20-POLYVALENT PNEUMOCOCCAL CONJUGATE VACCINE: ICD-10-CM

## 2024-09-16 DIAGNOSIS — D69.6 THROMBOCYTOPENIA (HCC): ICD-10-CM

## 2024-09-16 DIAGNOSIS — Z13.220 LIPID SCREENING: ICD-10-CM

## 2024-09-16 DIAGNOSIS — N18.31 STAGE 3A CHRONIC KIDNEY DISEASE (HCC): ICD-10-CM

## 2024-09-16 SDOH — ECONOMIC STABILITY: FOOD INSECURITY: WITHIN THE PAST 12 MONTHS, THE FOOD YOU BOUGHT JUST DIDN'T LAST AND YOU DIDN'T HAVE MONEY TO GET MORE.: NEVER TRUE

## 2024-09-16 SDOH — ECONOMIC STABILITY: FOOD INSECURITY: WITHIN THE PAST 12 MONTHS, YOU WORRIED THAT YOUR FOOD WOULD RUN OUT BEFORE YOU GOT MONEY TO BUY MORE.: NEVER TRUE

## 2024-09-16 SDOH — ECONOMIC STABILITY: INCOME INSECURITY: HOW HARD IS IT FOR YOU TO PAY FOR THE VERY BASICS LIKE FOOD, HOUSING, MEDICAL CARE, AND HEATING?: NOT HARD AT ALL

## 2024-09-16 ASSESSMENT — PATIENT HEALTH QUESTIONNAIRE - PHQ9
SUM OF ALL RESPONSES TO PHQ QUESTIONS 1-9: 3
2. FEELING DOWN, DEPRESSED OR HOPELESS: SEVERAL DAYS
8. MOVING OR SPEAKING SO SLOWLY THAT OTHER PEOPLE COULD HAVE NOTICED. OR THE OPPOSITE, BEING SO FIGETY OR RESTLESS THAT YOU HAVE BEEN MOVING AROUND A LOT MORE THAN USUAL: NOT AT ALL
10. IF YOU CHECKED OFF ANY PROBLEMS, HOW DIFFICULT HAVE THESE PROBLEMS MADE IT FOR YOU TO DO YOUR WORK, TAKE CARE OF THINGS AT HOME, OR GET ALONG WITH OTHER PEOPLE: SOMEWHAT DIFFICULT
7. TROUBLE CONCENTRATING ON THINGS, SUCH AS READING THE NEWSPAPER OR WATCHING TELEVISION: SEVERAL DAYS
SUM OF ALL RESPONSES TO PHQ9 QUESTIONS 1 & 2: 1
6. FEELING BAD ABOUT YOURSELF - OR THAT YOU ARE A FAILURE OR HAVE LET YOURSELF OR YOUR FAMILY DOWN: NOT AT ALL
SUM OF ALL RESPONSES TO PHQ QUESTIONS 1-9: 3
4. FEELING TIRED OR HAVING LITTLE ENERGY: SEVERAL DAYS
3. TROUBLE FALLING OR STAYING ASLEEP: NOT AT ALL
9. THOUGHTS THAT YOU WOULD BE BETTER OFF DEAD, OR OF HURTING YOURSELF: NOT AT ALL
5. POOR APPETITE OR OVEREATING: NOT AT ALL
1. LITTLE INTEREST OR PLEASURE IN DOING THINGS: NOT AT ALL
SUM OF ALL RESPONSES TO PHQ QUESTIONS 1-9: 3
SUM OF ALL RESPONSES TO PHQ QUESTIONS 1-9: 3

## 2024-10-30 ENCOUNTER — HOSPITAL ENCOUNTER (OUTPATIENT)
Dept: LAB | Age: 69
Discharge: HOME | End: 2024-10-30
Payer: MEDICARE

## 2024-10-30 DIAGNOSIS — Z12.5 PROSTATE CANCER SCREENING: ICD-10-CM

## 2024-10-30 DIAGNOSIS — Z13.220 LIPID SCREENING: ICD-10-CM

## 2024-10-30 LAB
ANISOCYTOSIS BLD QL SMEAR: ABNORMAL
BASOPHILS # BLD: 0.1 K/UL (ref 0–0.2)
BASOPHILS NFR BLD: 1 %
EOSINOPHIL # BLD: 0.1 K/UL (ref 0–0.7)
EOSINOPHIL NFR BLD: 1 %
ERYTHROCYTE [DISTWIDTH] IN BLOOD BY AUTOMATED COUNT: 15.2 % (ref 11.5–14.5)
HBV SURFACE AG SERPL QL IA: NORMAL
HCT VFR BLD AUTO: 35.9 % (ref 42–52)
HGB BLD-MCNC: 11.6 G/DL (ref 14–18)
LYMPHOCYTES # BLD: 1 K/UL (ref 1–4.8)
LYMPHOCYTES NFR BLD: 20.3 %
MACROCYTES BLD QL SMEAR: ABNORMAL
MCH RBC QN AUTO: 33.9 PG (ref 27–31.3)
MCHC RBC AUTO-ENTMCNC: 32.3 % (ref 33–37)
MCV RBC AUTO: 105 FL (ref 79–92.2)
MONOCYTES # BLD: 0.2 K/UL (ref 0.2–0.8)
MONOCYTES NFR BLD: 4.7 %
NEUTROPHILS # BLD: 3.6 K/UL (ref 1.4–6.5)
NEUTS SEG NFR BLD: 72.8 %
OVALOCYTES BLD QL SMEAR: ABNORMAL
PLATELET # BLD AUTO: 93 K/UL (ref 130–400)
PLATELET BLD QL SMEAR: ABNORMAL
POIKILOCYTOSIS BLD QL SMEAR: ABNORMAL
RBC # BLD AUTO: 3.42 M/UL (ref 4.7–6.1)
SLIDE REVIEW: ABNORMAL
WBC # BLD AUTO: 4.9 K/UL (ref 4.8–10.8)

## 2024-10-30 PROCEDURE — 86704 HEP B CORE ANTIBODY TOTAL: CPT

## 2024-10-30 PROCEDURE — 85025 COMPLETE CBC W/AUTO DIFF WBC: CPT

## 2024-10-30 PROCEDURE — 86850 RBC ANTIBODY SCREEN: CPT

## 2024-10-30 PROCEDURE — 86900 BLOOD TYPING SEROLOGIC ABO: CPT

## 2024-10-30 PROCEDURE — 86706 HEP B SURFACE ANTIBODY: CPT

## 2024-10-30 PROCEDURE — 87340 HEPATITIS B SURFACE AG IA: CPT

## 2024-10-30 PROCEDURE — 36415 COLL VENOUS BLD VENIPUNCTURE: CPT

## 2024-10-30 PROCEDURE — 86901 BLOOD TYPING SEROLOGIC RH(D): CPT

## 2024-10-31 LAB
ABO + RH BLD: NORMAL
BLD GP AB SCN SERPL QL: NORMAL
HBV SURFACE AB TITR SER: <3.5 MIU/ML

## 2024-11-01 LAB — HBV CORE AB SERPL QL IA: NEGATIVE

## 2024-12-11 ENCOUNTER — HOSPITAL ENCOUNTER (OUTPATIENT)
Dept: CARDIOLOGY | Facility: HOSPITAL | Age: 69
Discharge: HOME | End: 2024-12-11
Payer: COMMERCIAL

## 2024-12-11 DIAGNOSIS — Z95.0 PACEMAKER: ICD-10-CM

## 2024-12-11 DIAGNOSIS — I49.5 SINUS NODE DYSFUNCTION (MULTI): ICD-10-CM

## 2024-12-11 PROCEDURE — 93294 REM INTERROG EVL PM/LDLS PM: CPT | Performed by: INTERNAL MEDICINE

## 2024-12-11 PROCEDURE — 93296 REM INTERROG EVL PM/IDS: CPT

## 2025-01-08 ENCOUNTER — OFFICE VISIT (OUTPATIENT)
Dept: FAMILY MEDICINE CLINIC | Age: 70
End: 2025-01-08

## 2025-01-08 VITALS
HEIGHT: 70 IN | WEIGHT: 200 LBS | HEART RATE: 77 BPM | BODY MASS INDEX: 28.63 KG/M2 | DIASTOLIC BLOOD PRESSURE: 60 MMHG | SYSTOLIC BLOOD PRESSURE: 112 MMHG | OXYGEN SATURATION: 98 %

## 2025-01-08 DIAGNOSIS — R35.0 URINARY FREQUENCY: Primary | ICD-10-CM

## 2025-01-08 DIAGNOSIS — N18.31 STAGE 3A CHRONIC KIDNEY DISEASE (HCC): ICD-10-CM

## 2025-01-08 DIAGNOSIS — F32.1 MAJOR DEPRESSIVE DISORDER, SINGLE EPISODE, MODERATE (HCC): ICD-10-CM

## 2025-01-08 DIAGNOSIS — C79.51 METASTASIS TO BONE (HCC): ICD-10-CM

## 2025-01-08 LAB
BILIRUBIN, POC: NORMAL
BLOOD URINE, POC: NORMAL
CLARITY, POC: CLEAR
COLOR, POC: YELLOW
GLUCOSE URINE, POC: NORMAL MG/DL
KETONES, POC: NORMAL MG/DL
LEUKOCYTE EST, POC: NORMAL
NITRITE, POC: NORMAL
PH, POC: 5.5
PROTEIN, POC: NORMAL MG/DL
SPECIFIC GRAVITY, POC: 1.02
UROBILINOGEN, POC: NORMAL MG/DL

## 2025-01-08 RX ORDER — CIPROFLOXACIN 500 MG/1
500 TABLET, FILM COATED ORAL 2 TIMES DAILY
Qty: 20 TABLET | Refills: 0 | Status: SHIPPED | OUTPATIENT
Start: 2025-01-08 | End: 2025-01-18

## 2025-01-08 SDOH — ECONOMIC STABILITY: FOOD INSECURITY: WITHIN THE PAST 12 MONTHS, THE FOOD YOU BOUGHT JUST DIDN'T LAST AND YOU DIDN'T HAVE MONEY TO GET MORE.: NEVER TRUE

## 2025-01-08 SDOH — ECONOMIC STABILITY: FOOD INSECURITY: WITHIN THE PAST 12 MONTHS, YOU WORRIED THAT YOUR FOOD WOULD RUN OUT BEFORE YOU GOT MONEY TO BUY MORE.: NEVER TRUE

## 2025-01-08 ASSESSMENT — PATIENT HEALTH QUESTIONNAIRE - PHQ9
10. IF YOU CHECKED OFF ANY PROBLEMS, HOW DIFFICULT HAVE THESE PROBLEMS MADE IT FOR YOU TO DO YOUR WORK, TAKE CARE OF THINGS AT HOME, OR GET ALONG WITH OTHER PEOPLE: NOT DIFFICULT AT ALL
SUM OF ALL RESPONSES TO PHQ QUESTIONS 1-9: 0
SUM OF ALL RESPONSES TO PHQ QUESTIONS 1-9: 0
1. LITTLE INTEREST OR PLEASURE IN DOING THINGS: NOT AT ALL
5. POOR APPETITE OR OVEREATING: NOT AT ALL
4. FEELING TIRED OR HAVING LITTLE ENERGY: NOT AT ALL
2. FEELING DOWN, DEPRESSED OR HOPELESS: NOT AT ALL
7. TROUBLE CONCENTRATING ON THINGS, SUCH AS READING THE NEWSPAPER OR WATCHING TELEVISION: NOT AT ALL
SUM OF ALL RESPONSES TO PHQ QUESTIONS 1-9: 0
6. FEELING BAD ABOUT YOURSELF - OR THAT YOU ARE A FAILURE OR HAVE LET YOURSELF OR YOUR FAMILY DOWN: NOT AT ALL
SUM OF ALL RESPONSES TO PHQ QUESTIONS 1-9: 0
3. TROUBLE FALLING OR STAYING ASLEEP: NOT AT ALL
9. THOUGHTS THAT YOU WOULD BE BETTER OFF DEAD, OR OF HURTING YOURSELF: NOT AT ALL
8. MOVING OR SPEAKING SO SLOWLY THAT OTHER PEOPLE COULD HAVE NOTICED. OR THE OPPOSITE, BEING SO FIGETY OR RESTLESS THAT YOU HAVE BEEN MOVING AROUND A LOT MORE THAN USUAL: NOT AT ALL
SUM OF ALL RESPONSES TO PHQ9 QUESTIONS 1 & 2: 0

## 2025-01-08 NOTE — PROGRESS NOTES
AI technology. The patient (or guardian, if applicable) and other individuals in attendance at the appointment consented to the use of AI, including the recording.                    SARA Gonzalez - CNP

## 2025-01-08 NOTE — ASSESSMENT & PLAN NOTE
Managed by Oncology, follows with them closely  Recently restarted chemo d/t some numbers being higher than they would like

## 2025-03-19 ENCOUNTER — APPOINTMENT (OUTPATIENT)
Dept: CARDIOLOGY | Facility: CLINIC | Age: 70
End: 2025-03-19
Payer: MEDICARE

## 2025-03-19 ENCOUNTER — HOSPITAL ENCOUNTER (OUTPATIENT)
Dept: CARDIOLOGY | Facility: HOSPITAL | Age: 70
Discharge: HOME | End: 2025-03-19
Payer: MEDICARE

## 2025-03-19 VITALS
BODY MASS INDEX: 29.49 KG/M2 | HEART RATE: 72 BPM | HEIGHT: 70 IN | WEIGHT: 206 LBS | DIASTOLIC BLOOD PRESSURE: 70 MMHG | SYSTOLIC BLOOD PRESSURE: 112 MMHG

## 2025-03-19 DIAGNOSIS — Z95.0 PACEMAKER: ICD-10-CM

## 2025-03-19 DIAGNOSIS — Z87.891 FORMER SMOKER: ICD-10-CM

## 2025-03-19 DIAGNOSIS — I49.5 SICK SINUS SYNDROME (MULTI): ICD-10-CM

## 2025-03-19 DIAGNOSIS — I49.5 SINUS NODE DYSFUNCTION (MULTI): ICD-10-CM

## 2025-03-19 DIAGNOSIS — I49.5 SINUS NODE DYSFUNCTION (MULTI): Primary | ICD-10-CM

## 2025-03-19 PROCEDURE — 1157F ADVNC CARE PLAN IN RCRD: CPT | Performed by: INTERNAL MEDICINE

## 2025-03-19 PROCEDURE — 93280 PM DEVICE PROGR EVAL DUAL: CPT | Performed by: INTERNAL MEDICINE

## 2025-03-19 PROCEDURE — 99214 OFFICE O/P EST MOD 30 MIN: CPT | Performed by: INTERNAL MEDICINE

## 2025-03-19 PROCEDURE — 1036F TOBACCO NON-USER: CPT | Performed by: INTERNAL MEDICINE

## 2025-03-19 PROCEDURE — 93000 ELECTROCARDIOGRAM COMPLETE: CPT | Mod: DISTINCT PROCEDURAL SERVICE | Performed by: INTERNAL MEDICINE

## 2025-03-19 PROCEDURE — 93280 PM DEVICE PROGR EVAL DUAL: CPT

## 2025-03-19 PROCEDURE — 1159F MED LIST DOCD IN RCRD: CPT | Performed by: INTERNAL MEDICINE

## 2025-03-19 PROCEDURE — 3008F BODY MASS INDEX DOCD: CPT | Performed by: INTERNAL MEDICINE

## 2025-03-19 RX ORDER — POMALIDOMIDE 2 MG/1
2 CAPSULE ORAL DAILY
COMMUNITY
Start: 2025-03-11

## 2025-03-19 RX ORDER — DEXAMETHASONE 4 MG/1
1 TABLET ORAL
COMMUNITY
Start: 2025-02-19

## 2025-03-19 ASSESSMENT — ENCOUNTER SYMPTOMS
CARDIOVASCULAR NEGATIVE: 1
IRREGULAR HEARTBEAT: 0
SHORTNESS OF BREATH: 0
ORTHOPNEA: 0
SYNCOPE: 0
SNORING: 0
WHEEZING: 0
PND: 0
CLAUDICATION: 0
NEAR-SYNCOPE: 0
COUGH: 0

## 2025-03-19 NOTE — LETTER
March 19, 2025     Humberto Campbell MD  [address] [place] [id number]    Patient: Jose Manuel Mejias   YOB: 1955   Date of Visit: 3/19/2025       Dear Dr. Humberto Campbell MD:    Thank you for referring Jose Manuel Mejias to me for evaluation. Below are my notes for this consultation.  If you have questions, please do not hesitate to call me. I look forward to following your patient along with you.       Sincerely,     Grant Rhodes MD      CC: No Recipients  ______________________________________________________________________________________    CARDIOLOGY OFFICE VISIT      CHIEF COMPLAINT  Chief Complaint   Patient presents with   • Follow-up     6m-pacemaker check       HISTORY OF PRESENT ILLNESS  HPI  69-year-old  male who is followed for multiple myeloma on maintenance chemotherapy. He underwent implantation of a dual-chamber pacemaker on May 6, 2017 for sinus node dysfunction and had a history of chronically elevated right ventricular lead capture threshold. He underwent placement of a new right ventricular lead on September 19, 2023 and generator change out. He presents the office today for follow-up evaluation.     Since the last office visit, he has been doing well.  He denies any symptoms of chest pain or shortness breath or palpitations.    EKG performed today shows atrial paced rhythm with rate of 72 bpm QRS ration 74 ms QT corrected 407 ms.  Rhythm strip shows the same pattern.    Patient had a device interrogation today at the device clinic and he does have a dual-chamber pacemaker Medtronic with battery longevity 12 years 3 months.  No evidence of atrial fibrillation.  7 episodes of nonsustained ventricular tachycardia.  Brief.        Past Medical History  Past Medical History:   Diagnosis Date   • Anxiety    • Multiple myeloma    • Sinus node dysfunction (Multi)        Social History  Social History     Tobacco Use   • Smoking status: Former     Current packs/day: 0.00      Types: Cigarettes     Quit date:      Years since quittin.2   • Smokeless tobacco: Never   Substance Use Topics   • Alcohol use: Never   • Drug use: Yes     Types: Marijuana       Family History     Family History   Problem Relation Name Age of Onset   • No Known Problems Mother     • No Known Problems Father          Allergies:  Allergies   Allergen Reactions   • Iodine GI Upset   • Lactose GI Upset   • Penicillins Swelling and Rash   • Shellfish Derived Nausea/vomiting        Outpatient Medications:  Current Outpatient Medications   Medication Instructions   • acetaminophen (TYLENOL) 325 mg, Every 4 hours PRN   • aspirin 81 mg, Daily   • calcium carbonate (Calcium 600) 600 mg calcium (1,500 mg) tablet 2 tablets, 2 times daily (morning and late afternoon)   • cholecalciferol (VITAMIN D-3) 2,000 Units, Daily   • clonazePAM (KLONOPIN) 0.5 mg, 2 times daily PRN   • dexAMETHasone (Decadron) 4 mg tablet 1 tablet, Every 7 days   • diphenoxylate-atropine (Lomotil) 2.5-0.025 mg tablet 1 tablet, Daily   • ferrous sulfate 325 mg, Daily with breakfast   • omeprazole (PRILOSEC) 40 mg, Daily before breakfast   • Pomalyst 2 mg, Daily   • potassium chloride CR 20 mEq ER tablet 40 mEq, 2 times daily          REVIEW OF SYSTEMS  Review of Systems   Constitutional: Negative for malaise/fatigue.   Cardiovascular: Negative.  Negative for claudication, cyanosis, irregular heartbeat, leg swelling, near-syncope, orthopnea, paroxysmal nocturnal dyspnea and syncope.   Respiratory:  Negative for cough, shortness of breath, snoring and wheezing.    All other systems reviewed and are negative.        VITALS  Vitals:    25 0946   BP: 112/70   Pulse: 72       PHYSICAL EXAM  Constitutional:       Appearance: Normal and healthy appearance. Well-developed, overweight and not in distress.      Comments: Left sided device well healed   Neck:      Vascular: No JVR. JVD normal.   Pulmonary:      Effort: Pulmonary effort is normal.       Breath sounds: Normal breath sounds. No wheezing. No rhonchi. No rales.   Chest:      Chest wall: Not tender to palpatation.   Cardiovascular:      PMI at left midclavicular line. Normal rate. Regular rhythm. Normal S1. Normal S2.       Murmurs: There is no murmur.      No gallop.  No click. No rub.   Pulses:     Intact distal pulses.   Edema:     Peripheral edema absent.   Abdominal:      Tenderness: There is no abdominal tenderness.   Musculoskeletal: Normal range of motion.         General: No tenderness. Skin:     General: Skin is warm and dry.   Neurological:      General: No focal deficit present.      Mental Status: Alert and oriented to person, place and time.           ASSESSMENT AND PLAN  Clinical impressions:  1. Sinus node dysfunction status post dual-chamber pacemaker implant ([employer] Hiram PADILLA) on March 6, 2017.  2. Chronically elevated right ventricular lead capture thresholds.  Status postplacement of a new right ventricular lead and generator change out on September 19, 2023.  3. Multiple myeloma on maintenance chemotherapy.  4. Anxiety disorder with reported panic attacks followed by his primary care physician.  5. Overweight with a BMI of 26.11.    Plan recommendations    From the electrophysiologist on point he is doing well.  Will continue with current medical therapy.    Follow my office every 6 months or sooner needed.    Will continue watching burden of atrial and ventricular arrhythmias by device interrogation.  He may benefit of beta-blocker therapy if arrhythmias continue being present during device interrogations.    Risk factor modification and lifestyle modification discussed with patient. Diet , exercise and hydration discussed with patient.    I have personally review with patient during this office visit, laboratory data, echocardiogram results, stress test results, Holter-event monitor results prior and after the last electrophysiology visit. All questions has been  answered.    Please excuse any errors in grammar or translation related to this dictation.  Voice recognition software was utilized to prepare this document.

## 2025-03-19 NOTE — PROGRESS NOTES
CARDIOLOGY OFFICE VISIT      CHIEF COMPLAINT  Chief Complaint   Patient presents with    Follow-up     6m-pacemaker check       HISTORY OF PRESENT ILLNESS  HPI  69-year-old  male who is followed for multiple myeloma on maintenance chemotherapy. He underwent implantation of a dual-chamber pacemaker on May 6, 2017 for sinus node dysfunction and had a history of chronically elevated right ventricular lead capture threshold. He underwent placement of a new right ventricular lead on 2023 and generator change out. He presents the office today for follow-up evaluation.     Since the last office visit, he has been doing well.  He denies any symptoms of chest pain or shortness breath or palpitations.    EKG performed today shows atrial paced rhythm with rate of 72 bpm QRS ration 74 ms QT corrected 407 ms.  Rhythm strip shows the same pattern.    Patient had a device interrogation today at the device clinic and he does have a dual-chamber pacemaker Medtronic with battery longevity 12 years 3 months.  No evidence of atrial fibrillation.  7 episodes of nonsustained ventricular tachycardia.  Brief.        Past Medical History  Past Medical History:   Diagnosis Date    Anxiety     Multiple myeloma     Sinus node dysfunction (Multi)        Social History  Social History     Tobacco Use    Smoking status: Former     Current packs/day: 0.00     Types: Cigarettes     Quit date:      Years since quittin.2    Smokeless tobacco: Never   Substance Use Topics    Alcohol use: Never    Drug use: Yes     Types: Marijuana       Family History     Family History   Problem Relation Name Age of Onset    No Known Problems Mother      No Known Problems Father          Allergies:  Allergies   Allergen Reactions    Iodine GI Upset    Lactose GI Upset    Penicillins Swelling and Rash    Shellfish Derived Nausea/vomiting        Outpatient Medications:  Current Outpatient Medications   Medication Instructions     acetaminophen (TYLENOL) 325 mg, Every 4 hours PRN    aspirin 81 mg, Daily    calcium carbonate (Calcium 600) 600 mg calcium (1,500 mg) tablet 2 tablets, 2 times daily (morning and late afternoon)    cholecalciferol (VITAMIN D-3) 2,000 Units, Daily    clonazePAM (KLONOPIN) 0.5 mg, 2 times daily PRN    dexAMETHasone (Decadron) 4 mg tablet 1 tablet, Every 7 days    diphenoxylate-atropine (Lomotil) 2.5-0.025 mg tablet 1 tablet, Daily    ferrous sulfate 325 mg, Daily with breakfast    omeprazole (PRILOSEC) 40 mg, Daily before breakfast    Pomalyst 2 mg, Daily    potassium chloride CR 20 mEq ER tablet 40 mEq, 2 times daily          REVIEW OF SYSTEMS  Review of Systems   Constitutional: Negative for malaise/fatigue.   Cardiovascular: Negative.  Negative for claudication, cyanosis, irregular heartbeat, leg swelling, near-syncope, orthopnea, paroxysmal nocturnal dyspnea and syncope.   Respiratory:  Negative for cough, shortness of breath, snoring and wheezing.    All other systems reviewed and are negative.        VITALS  Vitals:    03/19/25 0946   BP: 112/70   Pulse: 72       PHYSICAL EXAM  Constitutional:       Appearance: Normal and healthy appearance. Well-developed, overweight and not in distress.      Comments: Left sided device well healed   Neck:      Vascular: No JVR. JVD normal.   Pulmonary:      Effort: Pulmonary effort is normal.      Breath sounds: Normal breath sounds. No wheezing. No rhonchi. No rales.   Chest:      Chest wall: Not tender to palpatation.   Cardiovascular:      PMI at left midclavicular line. Normal rate. Regular rhythm. Normal S1. Normal S2.       Murmurs: There is no murmur.      No gallop.  No click. No rub.   Pulses:     Intact distal pulses.   Edema:     Peripheral edema absent.   Abdominal:      Tenderness: There is no abdominal tenderness.   Musculoskeletal: Normal range of motion.         General: No tenderness. Skin:     General: Skin is warm and dry.   Neurological:      General: No  focal deficit present.      Mental Status: Alert and oriented to person, place and time.           ASSESSMENT AND PLAN  Clinical impressions:  1. Sinus node dysfunction status post dual-chamber pacemaker implant (Medtronic Hiram PADILLA) on March 6, 2017.  2. Chronically elevated right ventricular lead capture thresholds.  Status postplacement of a new right ventricular lead and generator change out on September 19, 2023.  3. Multiple myeloma on maintenance chemotherapy.  4. Anxiety disorder with reported panic attacks followed by his primary care physician.  5. Overweight with a BMI of 26.11.    Plan recommendations    From the electrophysiologist on point he is doing well.  Will continue with current medical therapy.    Follow my office every 6 months or sooner needed.    Will continue watching burden of atrial and ventricular arrhythmias by device interrogation.  He may benefit of beta-blocker therapy if arrhythmias continue being present during device interrogations.    Risk factor modification and lifestyle modification discussed with patient. Diet , exercise and hydration discussed with patient.    I have personally review with patient during this office visit, laboratory data, echocardiogram results, stress test results, Holter-event monitor results prior and after the last electrophysiology visit. All questions has been answered.    Please excuse any errors in grammar or translation related to this dictation.  Voice recognition software was utilized to prepare this document.

## 2025-03-19 NOTE — PATIENT INSTRUCTIONS
Follow up in 6 months with [facility]  Remote device checks will occur at 3 and 9 month intervals.  In clinic device checks are to be done every 6 months, on the same day of your appointment.  Continue same medications and treatments.   Patient educated on proper medication use.   Patient educated on risk factor modification.   Please bring any lab results from other providers / physicians to your next appointment.     Please bring all medicines, vitamins, and herbal supplements with you when you come to the office.     Prescriptions will not be filled unless you are compliant with your follow up appointments or have a follow up appointment scheduled as per instruction of your physician. Refills should be requested at the time of your visit.  DEB DANIEL LPN, AM SCRIBING FOR, AND IN THE PRESENCE OF DR. DAGOBERTO VINES MD  I, , personally performed the services described in the documentation as scribed by the nurse in my presence, and confirm it is both accurate and complete.

## 2025-04-17 ENCOUNTER — HOSPITAL ENCOUNTER (OUTPATIENT)
Dept: LAB | Age: 70
Discharge: HOME | End: 2025-04-17
Payer: MEDICARE

## 2025-04-17 LAB
ALBUMIN SERPL-MCNC: 3.8 G/DL (ref 3.5–4.6)
ALP SERPL-CCNC: 71 U/L (ref 35–104)
ALT SERPL-CCNC: 6 U/L (ref 0–41)
ANION GAP SERPL CALCULATED.3IONS-SCNC: 10 MEQ/L (ref 9–15)
ANISOCYTOSIS BLD QL SMEAR: ABNORMAL
AST SERPL-CCNC: 11 U/L (ref 0–40)
BASOPHILS # BLD: 0.1 K/UL (ref 0–0.2)
BASOPHILS NFR BLD: 1.2 %
BILIRUB SERPL-MCNC: 0.3 MG/DL (ref 0.2–0.7)
BUN SERPL-MCNC: 14 MG/DL (ref 8–23)
CALCIUM SERPL-MCNC: 8.6 MG/DL (ref 8.5–9.9)
CHLORIDE SERPL-SCNC: 110 MEQ/L (ref 95–107)
CO2 SERPL-SCNC: 22 MEQ/L (ref 20–31)
CREAT SERPL-MCNC: 1.37 MG/DL (ref 0.7–1.2)
EOSINOPHIL # BLD: 0.2 K/UL (ref 0–0.7)
EOSINOPHIL NFR BLD: 3.7 %
ERYTHROCYTE [DISTWIDTH] IN BLOOD BY AUTOMATED COUNT: 14.6 % (ref 11.5–14.5)
GLOBULIN SER CALC-MCNC: 2.1 G/DL (ref 2.3–3.5)
GLUCOSE SERPL-MCNC: 132 MG/DL (ref 70–99)
HCT VFR BLD AUTO: 38.1 % (ref 42–52)
HGB BLD-MCNC: 12.6 G/DL (ref 14–18)
LYMPHOCYTES # BLD: 1.5 K/UL (ref 1–4.8)
LYMPHOCYTES NFR BLD: 24.3 %
MACROCYTES BLD QL SMEAR: ABNORMAL
MCH RBC QN AUTO: 36.7 PG (ref 27–31.3)
MCHC RBC AUTO-ENTMCNC: 33.1 % (ref 33–37)
MCV RBC AUTO: 111.1 FL (ref 79–92.2)
MONOCYTES # BLD: 0.7 K/UL (ref 0.2–0.8)
MONOCYTES NFR BLD: 12.3 %
NEUTROPHILS # BLD: 3.5 K/UL (ref 1.4–6.5)
NEUTS SEG NFR BLD: 58.3 %
PLATELET # BLD AUTO: 142 K/UL (ref 130–400)
PLATELET BLD QL SMEAR: ADEQUATE
POTASSIUM SERPL-SCNC: 4.3 MEQ/L (ref 3.4–4.9)
PROT SERPL-MCNC: 5.9 G/DL (ref 6.3–8)
RBC # BLD AUTO: 3.43 M/UL (ref 4.7–6.1)
SLIDE REVIEW: ABNORMAL
SODIUM SERPL-SCNC: 142 MEQ/L (ref 135–144)
WBC # BLD AUTO: 6 K/UL (ref 4.8–10.8)

## 2025-04-17 PROCEDURE — 85025 COMPLETE CBC W/AUTO DIFF WBC: CPT

## 2025-04-17 PROCEDURE — 83520 IMMUNOASSAY QUANT NOS NONAB: CPT

## 2025-04-17 PROCEDURE — 80053 COMPREHEN METABOLIC PANEL: CPT

## 2025-04-17 PROCEDURE — 82784 ASSAY IGA/IGD/IGG/IGM EACH: CPT

## 2025-04-19 LAB
DEPRECATED KAPPA LC FREE/LAMBDA SER: 107.48 {RATIO} (ref 0.26–1.65)
IGA SERPL-MCNC: 7 MG/DL (ref 68–408)
IGG SERPL-MCNC: 365 MG/DL (ref 768–1632)
IGM SERPL-MCNC: <10 MG/DL (ref 35–263)
KAPPA LC FREE SER-MCNC: 209.59 MG/L (ref 3.3–19.4)
LAMBDA LC FREE SERPL-MCNC: 1.95 MG/L (ref 5.71–26.3)

## 2025-06-25 ENCOUNTER — HOSPITAL ENCOUNTER (OUTPATIENT)
Dept: CARDIOLOGY | Facility: HOSPITAL | Age: 70
Discharge: HOME | End: 2025-06-25
Payer: MEDICARE

## 2025-06-25 DIAGNOSIS — Z95.0 PACEMAKER: ICD-10-CM

## 2025-06-25 DIAGNOSIS — I49.5 SINUS NODE DYSFUNCTION (MULTI): ICD-10-CM

## 2025-06-25 PROCEDURE — 93296 REM INTERROG EVL PM/IDS: CPT

## 2025-06-25 PROCEDURE — 93294 REM INTERROG EVL PM/LDLS PM: CPT | Performed by: INTERNAL MEDICINE

## 2025-09-19 ENCOUNTER — APPOINTMENT (OUTPATIENT)
Dept: CARDIOLOGY | Facility: CLINIC | Age: 70
End: 2025-09-19
Payer: COMMERCIAL

## (undated) DEVICE — TUBING, SUCTION, 1/4" X 10', STRAIGHT: Brand: MEDLINE

## (undated) DEVICE — Device: Brand: ENDO SMARTCAP

## (undated) DEVICE — SNARE ENDOSCP AD L240CM LOOP W10MM SHTH DIA2.4MM RND INSUL

## (undated) DEVICE — PATIENT RETURN ELECTRODE, SINGLE-USE, NON CONTACT QUALITY MONITORING, ADULT, WITH 9 FT (2.7 M) CORD, FOR PATIENTS WEIGHING OVER 33LBS. (15KG): Brand: MEGADYNE

## (undated) DEVICE — ADAPTER FLSH PMP FLD MGMT GI IRRIG OFP 2 DISPOSABLE

## (undated) DEVICE — SINGLE PORT MANIFOLD: Brand: NEPTUNE 2

## (undated) DEVICE — ENDO CARRY-ON PROCEDURE KIT: Brand: ENDO CARRY-ON PROCEDURE KIT

## (undated) DEVICE — BRUSH ENDO CLN L90.5IN SHTH DIA1.7MM BRIST DIA5-7MM 2-6MM

## (undated) DEVICE — TRAP POLYP BALEEN

## (undated) DEVICE — FORCEPS BX L240CM JAW DIA2.4MM ORNG L CAP W/ NDL DISP RAD

## (undated) DEVICE — TUBE SET 96 MM 64 MM H2O PERISTALTIC STD AUX CHANNEL

## (undated) DEVICE — CONMED SCOPE SAVER BITE BLOCK, 20X27 MM: Brand: SCOPE SAVER